# Patient Record
Sex: MALE | Race: WHITE | NOT HISPANIC OR LATINO | Employment: PART TIME | URBAN - METROPOLITAN AREA
[De-identification: names, ages, dates, MRNs, and addresses within clinical notes are randomized per-mention and may not be internally consistent; named-entity substitution may affect disease eponyms.]

---

## 2019-01-07 ENCOUNTER — APPOINTMENT (OUTPATIENT)
Dept: URGENT CARE | Facility: CLINIC | Age: 34
End: 2019-01-07

## 2019-01-07 ENCOUNTER — APPOINTMENT (OUTPATIENT)
Dept: LAB | Facility: CLINIC | Age: 34
End: 2019-01-07

## 2019-01-07 DIAGNOSIS — Z02.1 PRE-EMPLOYMENT EXAMINATION: ICD-10-CM

## 2019-01-07 DIAGNOSIS — Z02.1 PRE-EMPLOYMENT EXAMINATION: Primary | ICD-10-CM

## 2019-01-07 PROCEDURE — 36415 COLL VENOUS BLD VENIPUNCTURE: CPT

## 2019-01-07 PROCEDURE — 86480 TB TEST CELL IMMUN MEASURE: CPT

## 2019-01-09 LAB
GAMMA INTERFERON BACKGROUND BLD IA-ACNC: 0.03 IU/ML
M TB IFN-G BLD-IMP: NEGATIVE
M TB IFN-G CD4+ BCKGRND COR BLD-ACNC: -0.01 IU/ML
M TB IFN-G CD4+ BCKGRND COR BLD-ACNC: -0.01 IU/ML
MITOGEN IGNF BCKGRD COR BLD-ACNC: >10 IU/ML

## 2019-03-05 ENCOUNTER — APPOINTMENT (OUTPATIENT)
Dept: LAB | Facility: CLINIC | Age: 34
End: 2019-03-05

## 2019-03-05 ENCOUNTER — TRANSCRIBE ORDERS (OUTPATIENT)
Dept: ADMINISTRATIVE | Facility: HOSPITAL | Age: 34
End: 2019-03-05

## 2019-03-05 DIAGNOSIS — Z01.84 IMMUNITY STATUS TESTING: ICD-10-CM

## 2019-03-05 DIAGNOSIS — Z01.84 IMMUNITY STATUS TESTING: Primary | ICD-10-CM

## 2019-03-05 PROCEDURE — 36415 COLL VENOUS BLD VENIPUNCTURE: CPT

## 2019-03-05 PROCEDURE — 86787 VARICELLA-ZOSTER ANTIBODY: CPT

## 2019-03-07 LAB — VZV IGG SER IA-ACNC: NORMAL

## 2020-04-08 ENCOUNTER — NURSE TRIAGE (OUTPATIENT)
Dept: OTHER | Facility: OTHER | Age: 35
End: 2020-04-08

## 2020-08-04 ENCOUNTER — TRANSCRIBE ORDERS (OUTPATIENT)
Dept: ADMINISTRATIVE | Facility: HOSPITAL | Age: 35
End: 2020-08-04

## 2020-08-04 DIAGNOSIS — R23.9 RECENT SKIN CHANGES: ICD-10-CM

## 2020-08-04 DIAGNOSIS — L03.116 CELLULITIS OF LEFT FOOT: Primary | ICD-10-CM

## 2020-12-30 ENCOUNTER — IMMUNIZATIONS (OUTPATIENT)
Dept: FAMILY MEDICINE CLINIC | Facility: HOSPITAL | Age: 35
End: 2020-12-30

## 2020-12-30 DIAGNOSIS — Z23 ENCOUNTER FOR IMMUNIZATION: ICD-10-CM

## 2020-12-30 PROCEDURE — 91301 SARS-COV-2 / COVID-19 MRNA VACCINE (MODERNA) 100 MCG: CPT

## 2020-12-30 PROCEDURE — 0011A SARS-COV-2 / COVID-19 MRNA VACCINE (MODERNA) 100 MCG: CPT

## 2021-01-26 ENCOUNTER — IMMUNIZATIONS (OUTPATIENT)
Dept: FAMILY MEDICINE CLINIC | Facility: HOSPITAL | Age: 36
End: 2021-01-26

## 2021-01-26 DIAGNOSIS — Z23 ENCOUNTER FOR IMMUNIZATION: Primary | ICD-10-CM

## 2021-01-26 PROCEDURE — 0012A SARS-COV-2 / COVID-19 MRNA VACCINE (MODERNA) 100 MCG: CPT

## 2021-01-26 PROCEDURE — 91301 SARS-COV-2 / COVID-19 MRNA VACCINE (MODERNA) 100 MCG: CPT

## 2021-08-24 ENCOUNTER — APPOINTMENT (OUTPATIENT)
Dept: LAB | Facility: CLINIC | Age: 36
End: 2021-08-24
Payer: COMMERCIAL

## 2021-08-24 DIAGNOSIS — L03.116 CELLULITIS OF LEFT FOOT: ICD-10-CM

## 2021-08-24 DIAGNOSIS — R60.9 EDEMA, UNSPECIFIED TYPE: ICD-10-CM

## 2021-08-24 DIAGNOSIS — E78.5 HYPERLIPIDEMIA, UNSPECIFIED HYPERLIPIDEMIA TYPE: ICD-10-CM

## 2021-08-24 LAB
ALBUMIN SERPL BCP-MCNC: 3.2 G/DL (ref 3.5–5)
ALP SERPL-CCNC: 100 U/L (ref 46–116)
ALT SERPL W P-5'-P-CCNC: 37 U/L (ref 12–78)
ANION GAP SERPL CALCULATED.3IONS-SCNC: 5 MMOL/L (ref 4–13)
AST SERPL W P-5'-P-CCNC: 17 U/L (ref 5–45)
BASOPHILS # BLD AUTO: 0.06 THOUSANDS/ΜL (ref 0–0.1)
BASOPHILS NFR BLD AUTO: 1 % (ref 0–1)
BILIRUB SERPL-MCNC: 0.52 MG/DL (ref 0.2–1)
BUN SERPL-MCNC: 11 MG/DL (ref 5–25)
CALCIUM ALBUM COR SERPL-MCNC: 9.4 MG/DL (ref 8.3–10.1)
CALCIUM SERPL-MCNC: 8.8 MG/DL (ref 8.3–10.1)
CHLORIDE SERPL-SCNC: 107 MMOL/L (ref 100–108)
CHOLEST SERPL-MCNC: 161 MG/DL (ref 50–200)
CO2 SERPL-SCNC: 28 MMOL/L (ref 21–32)
CREAT SERPL-MCNC: 0.86 MG/DL (ref 0.6–1.3)
EOSINOPHIL # BLD AUTO: 0.33 THOUSAND/ΜL (ref 0–0.61)
EOSINOPHIL NFR BLD AUTO: 3 % (ref 0–6)
ERYTHROCYTE [DISTWIDTH] IN BLOOD BY AUTOMATED COUNT: 13.6 % (ref 11.6–15.1)
GFR SERPL CREATININE-BSD FRML MDRD: 112 ML/MIN/1.73SQ M
GLUCOSE P FAST SERPL-MCNC: 98 MG/DL (ref 65–99)
HCT VFR BLD AUTO: 44.8 % (ref 36.5–49.3)
HDLC SERPL-MCNC: 53 MG/DL
HGB BLD-MCNC: 14.5 G/DL (ref 12–17)
IMM GRANULOCYTES # BLD AUTO: 0.07 THOUSAND/UL (ref 0–0.2)
IMM GRANULOCYTES NFR BLD AUTO: 1 % (ref 0–2)
LDLC SERPL CALC-MCNC: 92 MG/DL (ref 0–100)
LYMPHOCYTES # BLD AUTO: 3.22 THOUSANDS/ΜL (ref 0.6–4.47)
LYMPHOCYTES NFR BLD AUTO: 28 % (ref 14–44)
MCH RBC QN AUTO: 27.9 PG (ref 26.8–34.3)
MCHC RBC AUTO-ENTMCNC: 32.4 G/DL (ref 31.4–37.4)
MCV RBC AUTO: 86 FL (ref 82–98)
MONOCYTES # BLD AUTO: 0.98 THOUSAND/ΜL (ref 0.17–1.22)
MONOCYTES NFR BLD AUTO: 9 % (ref 4–12)
NEUTROPHILS # BLD AUTO: 6.77 THOUSANDS/ΜL (ref 1.85–7.62)
NEUTS SEG NFR BLD AUTO: 58 % (ref 43–75)
NONHDLC SERPL-MCNC: 108 MG/DL
NRBC BLD AUTO-RTO: 0 /100 WBCS
PLATELET # BLD AUTO: 192 THOUSANDS/UL (ref 149–390)
PMV BLD AUTO: 11.4 FL (ref 8.9–12.7)
POTASSIUM SERPL-SCNC: 3.9 MMOL/L (ref 3.5–5.3)
PROT SERPL-MCNC: 7.4 G/DL (ref 6.4–8.2)
RBC # BLD AUTO: 5.19 MILLION/UL (ref 3.88–5.62)
SODIUM SERPL-SCNC: 140 MMOL/L (ref 136–145)
T4 SERPL-MCNC: 8.9 UG/DL (ref 4.7–13.3)
TRIGL SERPL-MCNC: 82 MG/DL
TSH SERPL DL<=0.05 MIU/L-ACNC: 2.57 UIU/ML (ref 0.36–3.74)
WBC # BLD AUTO: 11.43 THOUSAND/UL (ref 4.31–10.16)

## 2021-08-24 PROCEDURE — 84443 ASSAY THYROID STIM HORMONE: CPT

## 2021-08-24 PROCEDURE — 85025 COMPLETE CBC W/AUTO DIFF WBC: CPT

## 2021-08-24 PROCEDURE — 80053 COMPREHEN METABOLIC PANEL: CPT

## 2021-08-24 PROCEDURE — 36415 COLL VENOUS BLD VENIPUNCTURE: CPT

## 2021-08-24 PROCEDURE — 80061 LIPID PANEL: CPT

## 2021-08-24 PROCEDURE — 84436 ASSAY OF TOTAL THYROXINE: CPT

## 2021-09-28 ENCOUNTER — HOSPITAL ENCOUNTER (INPATIENT)
Facility: HOSPITAL | Age: 36
LOS: 2 days | Discharge: HOME | DRG: 602 | End: 2021-10-01
Attending: INTERNAL MEDICINE | Admitting: INTERNAL MEDICINE
Payer: COMMERCIAL

## 2021-09-28 ENCOUNTER — APPOINTMENT (EMERGENCY)
Dept: VASCULAR ULTRASOUND | Facility: HOSPITAL | Age: 36
DRG: 602 | End: 2021-09-28
Payer: COMMERCIAL

## 2021-09-28 DIAGNOSIS — L03.115 CELLULITIS OF RIGHT LOWER LEG: Primary | ICD-10-CM

## 2021-09-28 DIAGNOSIS — L03.116 CELLULITIS OF LEFT LEG: ICD-10-CM

## 2021-09-28 DIAGNOSIS — L03.116 CELLULITIS OF LEFT LOWER EXTREMITY: ICD-10-CM

## 2021-09-28 PROBLEM — M79.89 LEG SWELLING: Status: ACTIVE | Noted: 2021-09-28

## 2021-09-28 PROBLEM — I31.3 PERICARDIAL EFFUSION: Status: ACTIVE | Noted: 2021-09-28

## 2021-09-28 PROBLEM — I47.10 SVT (SUPRAVENTRICULAR TACHYCARDIA): Status: ACTIVE | Noted: 2021-09-28

## 2021-09-28 PROBLEM — K21.9 GERD (GASTROESOPHAGEAL REFLUX DISEASE): Status: ACTIVE | Noted: 2021-09-28

## 2021-09-28 PROBLEM — E66.01 MORBID OBESITY WITH BMI OF 60.0-69.9, ADULT (HCC): Status: ACTIVE | Noted: 2021-09-28

## 2021-09-28 PROBLEM — I31.39 PERICARDIAL EFFUSION: Status: ACTIVE | Noted: 2021-09-28

## 2021-09-28 PROBLEM — I47.1 SVT (SUPRAVENTRICULAR TACHYCARDIA) (HCC): Status: ACTIVE | Noted: 2021-09-28

## 2021-09-28 PROBLEM — L03.119 CELLULITIS OF LOWER EXTREMITY: Status: ACTIVE | Noted: 2021-09-28

## 2021-09-28 PROBLEM — J45.909 ASTHMA: Status: ACTIVE | Noted: 2021-09-28

## 2021-09-28 LAB
ALBUMIN SERPL BCP-MCNC: 3.2 G/DL (ref 3.5–5)
ALP SERPL-CCNC: 85 U/L (ref 46–116)
ALT SERPL W P-5'-P-CCNC: 42 U/L (ref 12–78)
ANION GAP SERPL CALCULATED.3IONS-SCNC: 9 MMOL/L (ref 4–13)
APTT PPP: 30 SECONDS (ref 23–37)
AST SERPL W P-5'-P-CCNC: 19 U/L (ref 5–45)
BASOPHILS # BLD AUTO: 0.04 THOUSANDS/ΜL (ref 0–0.1)
BASOPHILS NFR BLD AUTO: 0 % (ref 0–1)
BILIRUB DIRECT SERPL-MCNC: 0.45 MG/DL (ref 0–0.2)
BILIRUB SERPL-MCNC: 1.77 MG/DL (ref 0.2–1)
BUN SERPL-MCNC: 13 MG/DL (ref 5–25)
CALCIUM SERPL-MCNC: 8.7 MG/DL (ref 8.3–10.1)
CHLORIDE SERPL-SCNC: 100 MMOL/L (ref 100–108)
CO2 SERPL-SCNC: 25 MMOL/L (ref 21–32)
CREAT SERPL-MCNC: 0.91 MG/DL (ref 0.6–1.3)
CRP SERPL QL: 146.9 MG/L
EOSINOPHIL # BLD AUTO: 0.31 THOUSAND/ΜL (ref 0–0.61)
EOSINOPHIL NFR BLD AUTO: 2 % (ref 0–6)
ERYTHROCYTE [DISTWIDTH] IN BLOOD BY AUTOMATED COUNT: 13.8 % (ref 11.6–15.1)
ERYTHROCYTE [SEDIMENTATION RATE] IN BLOOD: 42 MM/HOUR (ref 0–14)
GFR SERPL CREATININE-BSD FRML MDRD: 108 ML/MIN/1.73SQ M
GLUCOSE SERPL-MCNC: 96 MG/DL (ref 65–140)
HCT VFR BLD AUTO: 41.5 % (ref 36.5–49.3)
HGB BLD-MCNC: 13.7 G/DL (ref 12–17)
IMM GRANULOCYTES # BLD AUTO: 0.12 THOUSAND/UL (ref 0–0.2)
IMM GRANULOCYTES NFR BLD AUTO: 1 % (ref 0–2)
INR PPP: 1.11 (ref 0.84–1.19)
LYMPHOCYTES # BLD AUTO: 2.04 THOUSANDS/ΜL (ref 0.6–4.47)
LYMPHOCYTES NFR BLD AUTO: 15 % (ref 14–44)
MCH RBC QN AUTO: 27.7 PG (ref 26.8–34.3)
MCHC RBC AUTO-ENTMCNC: 33 G/DL (ref 31.4–37.4)
MCV RBC AUTO: 84 FL (ref 82–98)
MONOCYTES # BLD AUTO: 1.65 THOUSAND/ΜL (ref 0.17–1.22)
MONOCYTES NFR BLD AUTO: 12 % (ref 4–12)
NEUTROPHILS # BLD AUTO: 9.93 THOUSANDS/ΜL (ref 1.85–7.62)
NEUTS SEG NFR BLD AUTO: 70 % (ref 43–75)
NRBC BLD AUTO-RTO: 0 /100 WBCS
PLATELET # BLD AUTO: 165 THOUSANDS/UL (ref 149–390)
PLATELET # BLD AUTO: 202 THOUSANDS/UL (ref 149–390)
PMV BLD AUTO: 10.2 FL (ref 8.9–12.7)
PMV BLD AUTO: 11 FL (ref 8.9–12.7)
POTASSIUM SERPL-SCNC: 3.9 MMOL/L (ref 3.5–5.3)
PROT SERPL-MCNC: 7.4 G/DL (ref 6.4–8.2)
PROTHROMBIN TIME: 13.9 SECONDS (ref 11.6–14.5)
RBC # BLD AUTO: 4.95 MILLION/UL (ref 3.88–5.62)
SODIUM SERPL-SCNC: 134 MMOL/L (ref 136–145)
WBC # BLD AUTO: 14.09 THOUSAND/UL (ref 4.31–10.16)

## 2021-09-28 PROCEDURE — 93971 EXTREMITY STUDY: CPT

## 2021-09-28 PROCEDURE — 93971 EXTREMITY STUDY: CPT | Performed by: SURGERY

## 2021-09-28 PROCEDURE — 36415 COLL VENOUS BLD VENIPUNCTURE: CPT | Performed by: PHYSICIAN ASSISTANT

## 2021-09-28 PROCEDURE — 99284 EMERGENCY DEPT VISIT MOD MDM: CPT | Performed by: PHYSICIAN ASSISTANT

## 2021-09-28 PROCEDURE — 85025 COMPLETE CBC W/AUTO DIFF WBC: CPT | Performed by: PHYSICIAN ASSISTANT

## 2021-09-28 PROCEDURE — 80076 HEPATIC FUNCTION PANEL: CPT | Performed by: PHYSICIAN ASSISTANT

## 2021-09-28 PROCEDURE — 99220 PR INITIAL OBSERVATION CARE/DAY 70 MINUTES: CPT | Performed by: INTERNAL MEDICINE

## 2021-09-28 PROCEDURE — 85610 PROTHROMBIN TIME: CPT | Performed by: PHYSICIAN ASSISTANT

## 2021-09-28 PROCEDURE — 86140 C-REACTIVE PROTEIN: CPT | Performed by: INTERNAL MEDICINE

## 2021-09-28 PROCEDURE — 80048 BASIC METABOLIC PNL TOTAL CA: CPT | Performed by: PHYSICIAN ASSISTANT

## 2021-09-28 PROCEDURE — 85049 AUTOMATED PLATELET COUNT: CPT | Performed by: NURSE PRACTITIONER

## 2021-09-28 PROCEDURE — 85652 RBC SED RATE AUTOMATED: CPT | Performed by: INTERNAL MEDICINE

## 2021-09-28 PROCEDURE — 87040 BLOOD CULTURE FOR BACTERIA: CPT | Performed by: PHYSICIAN ASSISTANT

## 2021-09-28 PROCEDURE — 99284 EMERGENCY DEPT VISIT MOD MDM: CPT

## 2021-09-28 PROCEDURE — 85730 THROMBOPLASTIN TIME PARTIAL: CPT | Performed by: PHYSICIAN ASSISTANT

## 2021-09-28 RX ORDER — SODIUM CHLORIDE 9 MG/ML
75 INJECTION, SOLUTION INTRAVENOUS CONTINUOUS
Status: DISPENSED | OUTPATIENT
Start: 2021-09-28 | End: 2021-09-29

## 2021-09-28 RX ORDER — CEFAZOLIN SODIUM 2 G/50ML
2000 SOLUTION INTRAVENOUS EVERY 8 HOURS
Status: DISCONTINUED | OUTPATIENT
Start: 2021-09-28 | End: 2021-10-01

## 2021-09-28 RX ORDER — ACETAMINOPHEN 325 MG/1
650 TABLET ORAL EVERY 6 HOURS PRN
Status: DISCONTINUED | OUTPATIENT
Start: 2021-09-28 | End: 2021-10-01 | Stop reason: HOSPADM

## 2021-09-28 RX ORDER — ONDANSETRON 2 MG/ML
4 INJECTION INTRAMUSCULAR; INTRAVENOUS EVERY 6 HOURS PRN
Status: DISCONTINUED | OUTPATIENT
Start: 2021-09-28 | End: 2021-10-01 | Stop reason: HOSPADM

## 2021-09-28 RX ADMIN — SODIUM CHLORIDE 3 G: 9 INJECTION, SOLUTION INTRAVENOUS at 12:04

## 2021-09-28 RX ADMIN — SODIUM CHLORIDE 75 ML/HR: 0.9 INJECTION, SOLUTION INTRAVENOUS at 16:00

## 2021-09-28 RX ADMIN — CEFAZOLIN SODIUM 2000 MG: 2 SOLUTION INTRAVENOUS at 22:04

## 2021-09-28 RX ADMIN — ENOXAPARIN SODIUM 60 MG: 60 INJECTION SUBCUTANEOUS at 17:37

## 2021-09-28 RX ADMIN — ACETAMINOPHEN 650 MG: 325 TABLET, FILM COATED ORAL at 17:40

## 2021-09-29 PROBLEM — D72.829 LEUKOCYTOSIS: Status: ACTIVE | Noted: 2021-09-29

## 2021-09-29 PROBLEM — L53.9 ERYTHEMA OF LOWER EXTREMITY: Status: ACTIVE | Noted: 2021-09-28

## 2021-09-29 LAB
ALBUMIN SERPL BCP-MCNC: 2.9 G/DL (ref 3.5–5)
ALP SERPL-CCNC: 82 U/L (ref 46–116)
ALT SERPL W P-5'-P-CCNC: 33 U/L (ref 12–78)
ANION GAP SERPL CALCULATED.3IONS-SCNC: 6 MMOL/L (ref 4–13)
AST SERPL W P-5'-P-CCNC: 17 U/L (ref 5–45)
BILIRUB SERPL-MCNC: 1.17 MG/DL (ref 0.2–1)
BUN SERPL-MCNC: 10 MG/DL (ref 5–25)
CALCIUM ALBUM COR SERPL-MCNC: 9.2 MG/DL (ref 8.3–10.1)
CALCIUM SERPL-MCNC: 8.3 MG/DL (ref 8.3–10.1)
CHLORIDE SERPL-SCNC: 102 MMOL/L (ref 100–108)
CO2 SERPL-SCNC: 28 MMOL/L (ref 21–32)
CREAT SERPL-MCNC: 0.94 MG/DL (ref 0.6–1.3)
ERYTHROCYTE [DISTWIDTH] IN BLOOD BY AUTOMATED COUNT: 13.9 % (ref 11.6–15.1)
GFR SERPL CREATININE-BSD FRML MDRD: 104 ML/MIN/1.73SQ M
GLUCOSE P FAST SERPL-MCNC: 104 MG/DL (ref 65–99)
GLUCOSE SERPL-MCNC: 104 MG/DL (ref 65–140)
HCT VFR BLD AUTO: 37.8 % (ref 36.5–49.3)
HGB BLD-MCNC: 12.3 G/DL (ref 12–17)
MAGNESIUM SERPL-MCNC: 2.1 MG/DL (ref 1.6–2.6)
MCH RBC QN AUTO: 27.5 PG (ref 26.8–34.3)
MCHC RBC AUTO-ENTMCNC: 32.5 G/DL (ref 31.4–37.4)
MCV RBC AUTO: 85 FL (ref 82–98)
PHOSPHATE SERPL-MCNC: 2.8 MG/DL (ref 2.7–4.5)
PLATELET # BLD AUTO: 163 THOUSANDS/UL (ref 149–390)
PMV BLD AUTO: 10.9 FL (ref 8.9–12.7)
POTASSIUM SERPL-SCNC: 4.1 MMOL/L (ref 3.5–5.3)
PROT SERPL-MCNC: 7 G/DL (ref 6.4–8.2)
RBC # BLD AUTO: 4.47 MILLION/UL (ref 3.88–5.62)
SODIUM SERPL-SCNC: 136 MMOL/L (ref 136–145)
WBC # BLD AUTO: 12.09 THOUSAND/UL (ref 4.31–10.16)

## 2021-09-29 PROCEDURE — 83735 ASSAY OF MAGNESIUM: CPT | Performed by: NURSE PRACTITIONER

## 2021-09-29 PROCEDURE — 99255 IP/OBS CONSLTJ NEW/EST HI 80: CPT | Performed by: INTERNAL MEDICINE

## 2021-09-29 PROCEDURE — 85027 COMPLETE CBC AUTOMATED: CPT | Performed by: NURSE PRACTITIONER

## 2021-09-29 PROCEDURE — 99225 PR SBSQ OBSERVATION CARE/DAY 25 MINUTES: CPT | Performed by: PHYSICIAN ASSISTANT

## 2021-09-29 PROCEDURE — 80053 COMPREHEN METABOLIC PANEL: CPT | Performed by: NURSE PRACTITIONER

## 2021-09-29 PROCEDURE — 36415 COLL VENOUS BLD VENIPUNCTURE: CPT | Performed by: NURSE PRACTITIONER

## 2021-09-29 PROCEDURE — 84100 ASSAY OF PHOSPHORUS: CPT | Performed by: NURSE PRACTITIONER

## 2021-09-29 RX ORDER — DILTIAZEM HYDROCHLORIDE 120 MG/1
120 CAPSULE, COATED, EXTENDED RELEASE ORAL DAILY
Status: DISCONTINUED | OUTPATIENT
Start: 2021-09-29 | End: 2021-10-01 | Stop reason: HOSPADM

## 2021-09-29 RX ORDER — MONTELUKAST SODIUM 10 MG/1
10 TABLET ORAL
Status: DISCONTINUED | OUTPATIENT
Start: 2021-09-29 | End: 2021-10-01 | Stop reason: HOSPADM

## 2021-09-29 RX ADMIN — MONTELUKAST SODIUM 10 MG: 10 TABLET, FILM COATED ORAL at 21:42

## 2021-09-29 RX ADMIN — CEFAZOLIN SODIUM 2000 MG: 2 SOLUTION INTRAVENOUS at 04:39

## 2021-09-29 RX ADMIN — ACETAMINOPHEN 650 MG: 325 TABLET, FILM COATED ORAL at 18:41

## 2021-09-29 RX ADMIN — CEFAZOLIN SODIUM 2000 MG: 2 SOLUTION INTRAVENOUS at 13:45

## 2021-09-29 RX ADMIN — CEFAZOLIN SODIUM 2000 MG: 2 SOLUTION INTRAVENOUS at 21:38

## 2021-09-29 RX ADMIN — ENOXAPARIN SODIUM 60 MG: 60 INJECTION SUBCUTANEOUS at 08:28

## 2021-09-29 RX ADMIN — ENOXAPARIN SODIUM 60 MG: 60 INJECTION SUBCUTANEOUS at 18:42

## 2021-09-29 RX ADMIN — DILTIAZEM HYDROCHLORIDE 120 MG: 120 CAPSULE, COATED, EXTENDED RELEASE ORAL at 08:28

## 2021-09-30 ENCOUNTER — APPOINTMENT (INPATIENT)
Dept: RADIOLOGY | Facility: HOSPITAL | Age: 36
DRG: 602 | End: 2021-09-30
Payer: COMMERCIAL

## 2021-09-30 PROBLEM — L03.116 CELLULITIS OF LEFT LOWER EXTREMITY: Status: ACTIVE | Noted: 2021-09-28

## 2021-09-30 LAB
ANION GAP SERPL CALCULATED.3IONS-SCNC: 10 MMOL/L (ref 4–13)
BUN SERPL-MCNC: 8 MG/DL (ref 5–25)
CALCIUM SERPL-MCNC: 8.2 MG/DL (ref 8.3–10.1)
CHLORIDE SERPL-SCNC: 103 MMOL/L (ref 100–108)
CO2 SERPL-SCNC: 26 MMOL/L (ref 21–32)
CREAT SERPL-MCNC: 0.91 MG/DL (ref 0.6–1.3)
ERYTHROCYTE [DISTWIDTH] IN BLOOD BY AUTOMATED COUNT: 13.7 % (ref 11.6–15.1)
GFR SERPL CREATININE-BSD FRML MDRD: 108 ML/MIN/1.73SQ M
GLUCOSE SERPL-MCNC: 107 MG/DL (ref 65–140)
HCT VFR BLD AUTO: 38.4 % (ref 36.5–49.3)
HGB BLD-MCNC: 12.3 G/DL (ref 12–17)
MCH RBC QN AUTO: 27.5 PG (ref 26.8–34.3)
MCHC RBC AUTO-ENTMCNC: 32 G/DL (ref 31.4–37.4)
MCV RBC AUTO: 86 FL (ref 82–98)
PLATELET # BLD AUTO: 182 THOUSANDS/UL (ref 149–390)
PMV BLD AUTO: 11.4 FL (ref 8.9–12.7)
POTASSIUM SERPL-SCNC: 4.1 MMOL/L (ref 3.5–5.3)
RBC # BLD AUTO: 4.47 MILLION/UL (ref 3.88–5.62)
SARS-COV-2 RNA RESP QL NAA+PROBE: NEGATIVE
SODIUM SERPL-SCNC: 139 MMOL/L (ref 136–145)
WBC # BLD AUTO: 12.55 THOUSAND/UL (ref 4.31–10.16)

## 2021-09-30 PROCEDURE — 80048 BASIC METABOLIC PNL TOTAL CA: CPT | Performed by: PHYSICIAN ASSISTANT

## 2021-09-30 PROCEDURE — 99233 SBSQ HOSP IP/OBS HIGH 50: CPT | Performed by: INTERNAL MEDICINE

## 2021-09-30 PROCEDURE — U0003 INFECTIOUS AGENT DETECTION BY NUCLEIC ACID (DNA OR RNA); SEVERE ACUTE RESPIRATORY SYNDROME CORONAVIRUS 2 (SARS-COV-2) (CORONAVIRUS DISEASE [COVID-19]), AMPLIFIED PROBE TECHNIQUE, MAKING USE OF HIGH THROUGHPUT TECHNOLOGIES AS DESCRIBED BY CMS-2020-01-R: HCPCS | Performed by: INTERNAL MEDICINE

## 2021-09-30 PROCEDURE — 85027 COMPLETE CBC AUTOMATED: CPT | Performed by: PHYSICIAN ASSISTANT

## 2021-09-30 PROCEDURE — 71045 X-RAY EXAM CHEST 1 VIEW: CPT

## 2021-09-30 PROCEDURE — 99232 SBSQ HOSP IP/OBS MODERATE 35: CPT | Performed by: PHYSICIAN ASSISTANT

## 2021-09-30 PROCEDURE — U0005 INFEC AGEN DETEC AMPLI PROBE: HCPCS | Performed by: INTERNAL MEDICINE

## 2021-09-30 RX ORDER — MONTELUKAST SODIUM 10 MG/1
10 TABLET ORAL
COMMUNITY

## 2021-09-30 RX ORDER — DILTIAZEM HYDROCHLORIDE 120 MG/1
120 TABLET, FILM COATED ORAL DAILY
COMMUNITY

## 2021-09-30 RX ADMIN — ENOXAPARIN SODIUM 60 MG: 60 INJECTION SUBCUTANEOUS at 08:19

## 2021-09-30 RX ADMIN — MONTELUKAST SODIUM 10 MG: 10 TABLET, FILM COATED ORAL at 21:10

## 2021-09-30 RX ADMIN — CEFAZOLIN SODIUM 2000 MG: 2 SOLUTION INTRAVENOUS at 05:17

## 2021-09-30 RX ADMIN — CEFAZOLIN SODIUM 2000 MG: 2 SOLUTION INTRAVENOUS at 13:56

## 2021-09-30 RX ADMIN — CEFAZOLIN SODIUM 2000 MG: 2 SOLUTION INTRAVENOUS at 20:30

## 2021-09-30 RX ADMIN — ENOXAPARIN SODIUM 60 MG: 60 INJECTION SUBCUTANEOUS at 17:17

## 2021-09-30 RX ADMIN — DILTIAZEM HYDROCHLORIDE 120 MG: 120 CAPSULE, COATED, EXTENDED RELEASE ORAL at 08:19

## 2021-10-01 VITALS
DIASTOLIC BLOOD PRESSURE: 82 MMHG | TEMPERATURE: 98.2 F | WEIGHT: 315 LBS | SYSTOLIC BLOOD PRESSURE: 128 MMHG | BODY MASS INDEX: 45.1 KG/M2 | HEIGHT: 70 IN | RESPIRATION RATE: 20 BRPM | OXYGEN SATURATION: 93 % | HEART RATE: 83 BPM

## 2021-10-01 LAB
ERYTHROCYTE [DISTWIDTH] IN BLOOD BY AUTOMATED COUNT: 13.7 % (ref 11.6–15.1)
HCT VFR BLD AUTO: 38.3 % (ref 36.5–49.3)
HGB BLD-MCNC: 12.4 G/DL (ref 12–17)
MCH RBC QN AUTO: 27.9 PG (ref 26.8–34.3)
MCHC RBC AUTO-ENTMCNC: 32.4 G/DL (ref 31.4–37.4)
MCV RBC AUTO: 86 FL (ref 82–98)
PLATELET # BLD AUTO: 250 THOUSANDS/UL (ref 149–390)
PMV BLD AUTO: 10 FL (ref 8.9–12.7)
RBC # BLD AUTO: 4.45 MILLION/UL (ref 3.88–5.62)
WBC # BLD AUTO: 11.88 THOUSAND/UL (ref 4.31–10.16)

## 2021-10-01 PROCEDURE — 99232 SBSQ HOSP IP/OBS MODERATE 35: CPT | Performed by: INTERNAL MEDICINE

## 2021-10-01 PROCEDURE — 99239 HOSP IP/OBS DSCHRG MGMT >30: CPT | Performed by: NURSE PRACTITIONER

## 2021-10-01 PROCEDURE — 85027 COMPLETE CBC AUTOMATED: CPT | Performed by: PHYSICIAN ASSISTANT

## 2021-10-01 RX ORDER — CEPHALEXIN 500 MG/1
1000 CAPSULE ORAL EVERY 6 HOURS SCHEDULED
Status: DISCONTINUED | OUTPATIENT
Start: 2021-10-01 | End: 2021-10-01 | Stop reason: HOSPADM

## 2021-10-01 RX ORDER — CEPHALEXIN 500 MG/1
1000 CAPSULE ORAL EVERY 6 HOURS SCHEDULED
Qty: 56 CAPSULE | Refills: 0 | Status: SHIPPED | OUTPATIENT
Start: 2021-10-01 | End: 2021-10-01 | Stop reason: SDUPTHER

## 2021-10-01 RX ORDER — CEPHALEXIN 500 MG/1
1000 CAPSULE ORAL EVERY 6 HOURS SCHEDULED
Qty: 56 CAPSULE | Refills: 0 | Status: SHIPPED | OUTPATIENT
Start: 2021-10-01 | End: 2021-10-08

## 2021-10-01 RX ADMIN — CEFAZOLIN SODIUM 2000 MG: 2 SOLUTION INTRAVENOUS at 05:43

## 2021-10-01 RX ADMIN — ENOXAPARIN SODIUM 60 MG: 60 INJECTION SUBCUTANEOUS at 08:00

## 2021-10-01 RX ADMIN — CEPHALEXIN 1000 MG: 500 CAPSULE ORAL at 13:15

## 2021-10-01 RX ADMIN — DILTIAZEM HYDROCHLORIDE 120 MG: 120 CAPSULE, COATED, EXTENDED RELEASE ORAL at 08:00

## 2021-10-03 LAB
BACTERIA BLD CULT: NORMAL
BACTERIA BLD CULT: NORMAL

## 2021-11-10 ENCOUNTER — IMMUNIZATIONS (OUTPATIENT)
Dept: FAMILY MEDICINE CLINIC | Facility: HOSPITAL | Age: 36
End: 2021-11-10

## 2021-11-10 ENCOUNTER — OFFICE VISIT (OUTPATIENT)
Dept: INFECTIOUS DISEASES | Facility: CLINIC | Age: 36
End: 2021-11-10
Payer: COMMERCIAL

## 2021-11-10 VITALS
TEMPERATURE: 97.8 F | SYSTOLIC BLOOD PRESSURE: 146 MMHG | HEIGHT: 70 IN | DIASTOLIC BLOOD PRESSURE: 80 MMHG | BODY MASS INDEX: 45.1 KG/M2 | WEIGHT: 315 LBS | RESPIRATION RATE: 17 BRPM | HEART RATE: 78 BPM

## 2021-11-10 DIAGNOSIS — Z23 ENCOUNTER FOR IMMUNIZATION: Primary | ICD-10-CM

## 2021-11-10 DIAGNOSIS — L97.909 VENOUS STASIS ULCER WITH EDEMA OF LOWER LEG (HCC): Primary | ICD-10-CM

## 2021-11-10 DIAGNOSIS — R60.9 VENOUS STASIS ULCER WITH EDEMA OF LOWER LEG (HCC): Primary | ICD-10-CM

## 2021-11-10 DIAGNOSIS — I83.899 VENOUS STASIS ULCER WITH EDEMA OF LOWER LEG (HCC): Primary | ICD-10-CM

## 2021-11-10 DIAGNOSIS — Z87.2 HISTORY OF CELLULITIS: ICD-10-CM

## 2021-11-10 DIAGNOSIS — I83.009 VENOUS STASIS ULCER WITH EDEMA OF LOWER LEG (HCC): Primary | ICD-10-CM

## 2021-11-10 DIAGNOSIS — E66.01 MORBID OBESITY WITH BMI OF 60.0-69.9, ADULT (HCC): ICD-10-CM

## 2021-11-10 PROCEDURE — 91306 COVID-19 MODERNA VACC 0.25 ML BOOSTER: CPT

## 2021-11-10 PROCEDURE — 99214 OFFICE O/P EST MOD 30 MIN: CPT | Performed by: INTERNAL MEDICINE

## 2021-11-10 PROCEDURE — 0013A COVID-19 MODERNA VACC 0.25 ML BOOSTER: CPT

## 2021-11-10 RX ORDER — ALBUTEROL SULFATE 90 UG/1
2 AEROSOL, METERED RESPIRATORY (INHALATION) EVERY 6 HOURS PRN
COMMUNITY

## 2021-11-11 DIAGNOSIS — L03.116 CELLULITIS OF LEFT LOWER EXTREMITY: Primary | ICD-10-CM

## 2021-12-29 ENCOUNTER — OFFICE VISIT (OUTPATIENT)
Dept: URGENT CARE | Facility: CLINIC | Age: 36
End: 2021-12-29
Payer: COMMERCIAL

## 2021-12-29 VITALS
OXYGEN SATURATION: 99 % | WEIGHT: 315 LBS | RESPIRATION RATE: 16 BRPM | BODY MASS INDEX: 57.97 KG/M2 | HEART RATE: 88 BPM | TEMPERATURE: 97.9 F | HEIGHT: 62 IN

## 2021-12-29 DIAGNOSIS — J02.9 SORE THROAT: ICD-10-CM

## 2021-12-29 DIAGNOSIS — J45.901 ASTHMA WITH ACUTE EXACERBATION, UNSPECIFIED ASTHMA SEVERITY, UNSPECIFIED WHETHER PERSISTENT: ICD-10-CM

## 2021-12-29 DIAGNOSIS — J06.9 UPPER RESPIRATORY INFECTION WITH COUGH AND CONGESTION: Primary | ICD-10-CM

## 2021-12-29 DIAGNOSIS — R50.81 FEVER IN OTHER DISEASES: ICD-10-CM

## 2021-12-29 LAB — S PYO AG THROAT QL: NEGATIVE

## 2021-12-29 PROCEDURE — 87880 STREP A ASSAY W/OPTIC: CPT | Performed by: NURSE PRACTITIONER

## 2021-12-29 PROCEDURE — 87636 SARSCOV2 & INF A&B AMP PRB: CPT | Performed by: NURSE PRACTITIONER

## 2021-12-29 PROCEDURE — 87070 CULTURE OTHR SPECIMN AEROBIC: CPT | Performed by: NURSE PRACTITIONER

## 2021-12-29 PROCEDURE — 99214 OFFICE O/P EST MOD 30 MIN: CPT | Performed by: NURSE PRACTITIONER

## 2021-12-29 RX ORDER — DOXYCYCLINE HYCLATE 100 MG
100 TABLET ORAL 2 TIMES DAILY
Qty: 14 TABLET | Refills: 0 | Status: SHIPPED | OUTPATIENT
Start: 2021-12-29 | End: 2022-01-05

## 2021-12-29 RX ORDER — PREDNISONE 20 MG/1
60 TABLET ORAL DAILY
Qty: 15 TABLET | Refills: 0 | Status: SHIPPED | OUTPATIENT
Start: 2021-12-29 | End: 2022-01-03

## 2021-12-31 LAB — BACTERIA THROAT CULT: NORMAL

## 2022-01-03 LAB
FLUAV RNA RESP QL NAA+PROBE: NEGATIVE
FLUBV RNA RESP QL NAA+PROBE: NEGATIVE
SARS-COV-2 RNA RESP QL NAA+PROBE: NEGATIVE

## 2022-01-18 LAB
EXTERNAL HIV CONFIRMATION: NORMAL
EXTERNAL HIV SCREEN: NORMAL
HCV AB SER-ACNC: NEGATIVE

## 2022-06-28 ENCOUNTER — OFFICE VISIT (OUTPATIENT)
Dept: BARIATRICS | Facility: CLINIC | Age: 37
End: 2022-06-28
Payer: COMMERCIAL

## 2022-06-28 VITALS
HEART RATE: 77 BPM | RESPIRATION RATE: 16 BRPM | DIASTOLIC BLOOD PRESSURE: 82 MMHG | HEIGHT: 69 IN | SYSTOLIC BLOOD PRESSURE: 142 MMHG | WEIGHT: 315 LBS | TEMPERATURE: 99 F | BODY MASS INDEX: 46.65 KG/M2

## 2022-06-28 DIAGNOSIS — G47.33 OBSTRUCTIVE SLEEP APNEA: ICD-10-CM

## 2022-06-28 DIAGNOSIS — K21.9 GERD (GASTROESOPHAGEAL REFLUX DISEASE): ICD-10-CM

## 2022-06-28 DIAGNOSIS — I47.1 SVT (SUPRAVENTRICULAR TACHYCARDIA) (HCC): ICD-10-CM

## 2022-06-28 DIAGNOSIS — J45.909 ASTHMA: ICD-10-CM

## 2022-06-28 DIAGNOSIS — E66.01 MORBID OBESITY WITH BMI OF 50.0-59.9, ADULT (HCC): Primary | ICD-10-CM

## 2022-06-28 DIAGNOSIS — R63.5 ABNORMAL WEIGHT GAIN: ICD-10-CM

## 2022-06-28 PROCEDURE — 99244 OFF/OP CNSLTJ NEW/EST MOD 40: CPT | Performed by: INTERNAL MEDICINE

## 2022-06-28 RX ORDER — DILTIAZEM HYDROCHLORIDE 120 MG/1
CAPSULE, EXTENDED RELEASE ORAL
COMMUNITY
Start: 2022-05-19

## 2022-06-28 NOTE — PROGRESS NOTES
Assessment/Plan:  Chris Conley was seen today for consult  Diagnoses and all orders for this visit:      SVT (supraventricular tachycardia) ([address] )  Avoid sympathomimetics    GERD (gastroesophageal reflux disease)  May improve with weight loss    Obstructive sleep apnea  Continue to use CPAP daily  Can improve with significant weight loss    Asthma  Well-controlled on medications      Morbid obesity with BMI of 50 0-59 9, adult (HCC)  Abnormal weight gain    - Discussed options of HealthyCORE-Intensive Lifestyle Intervention Program, Very Low Calorie Diet-VLCD, Conservative Program, Yudi-En-Y Gastric Bypass, and Vertical Sleeve Gastrectomy and the role of weight loss medications  - Explained the importance of making lifestyle changes first before starting any anti-obesity medications  Patient should demonstrate lifestyle changes first before anti-obesity medication can be initiated  - Patient is interested in pursuing Yudi-En-Y Gastric Bypass and Vertical Sleeve Gastrectomy  - Initial weight loss goal of 5-10% weight loss for improved health  - Weight loss can improve patient's co-morbid conditions and/or prevent weight-related complications  Goals:  Do not skip any meals! Food log (ie ) www myfitnesspal com,sparkpeople  com,loseit com,calorieking  com,etc  baritastic (use skinnytaste  com, dietdoctor  com or smartphone hung [employer] for recipes)  No sugary beverages  At least 64oz of water daily  Increase physical activity by 10 minutes daily  Gradually increase physical activity to a goal of 5 days per week for 30 minutes of MODERATE intensity PLUS 2 days per week of FULL BODY resistance training (use smartphone apps [employer], Home Workout, etc )  Goal protein 150g per day  9842-6224 calories     Total time spent: 45 minute visit, with >50% face-to-face time spent counseling patient on diet behavior and exercise modification for weight loss      Follow up in approximately 2 weeks with Bariatric Surgeon  Subjective:   Chief Complaint   Patient presents with    Consult     Initial visit with fausto       Patient ID: Mercedes Poole  is a 40 y o  male with excess weight/obesity here to pursue weight management  Previous notes and records have been reviewed  Past Medical History:   Diagnosis Date    Asthma     Pericardial effusion     Sleep apnea     SVT (supraventricular tachycardia) (HCC)      Past Surgical History:   Procedure Laterality Date    CARDIAC SURGERY         HPI:  Wt Readings from Last 20 Encounters:   06/28/22 (!) 208 kg (458 lb 6 4 oz)   12/29/21 (!) 145 kg (320 lb)   11/10/21 (!) 202 kg (445 lb)   09/29/21 (!) 202 kg (445 lb 5 3 oz)       Severity: Very Severe  Onset:  Since childhood     Modifiers: Diet and Exercise, behavioral changes  Contributing factors: Poor Food Choices and Insufficient Physical Activity  Associated symptoms: comorbid conditions  Goal weight loss: 5-10% STG   His mother had VSG and did fairly well- sometimes has trouble swallowing  Gets fast food a lot while at work  B- sleeps  S-  L- chicken sandwich  S-  D- pizza or gyro  S- trail mix, gummy bears, ara covered coffee beans    Hydration: 4-5 bottles water, sometimes tea, iced tea with sugar   Alcohol: rare  Smoking: no  Exercise: no  Occupation: 2 full time paramedic, works overnight, 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   In the process of closing a house with his girlfriend   Sleep: 4 hours   STOP bang: +sincere on cpap    The following portions of the patient's history were reviewed and updated as appropriate: allergies, current medications, past family history, past medical history, past social history, past surgical history, and problem list     Review of Systems   Constitutional: Negative for appetite change, chills and fever  HENT: Negative for rhinorrhea and sore throat  Respiratory: Negative for cough, chest tightness and shortness of breath      Cardiovascular: Negative for chest pain and leg swelling  Gastrointestinal: Positive for diarrhea (intermittently)  Negative for abdominal pain, constipation, nausea and vomiting         +acid reflux   Endocrine: Negative for cold intolerance and heat intolerance  Genitourinary: Negative for difficulty urinating  Musculoskeletal: Negative for arthralgias  Skin: Negative for color change  Neurological: Negative for dizziness, numbness and headaches  Psychiatric/Behavioral: Negative for sleep disturbance  The patient is not nervous/anxious  All other systems reviewed and are negative  Objective:  /82 (BP Location: Left arm, Patient Position: Sitting, Cuff Size: Large) Comment (BP Location): lower arm  Pulse 77   Temp 99 °F (37 2 °C) (Tympanic)   Resp 16   Ht 5' 9 13" (1 756 m)   Wt (!) 208 kg (458 lb 6 4 oz)   BMI 67 43 kg/m²   Constitutional: Well-developed, well-nourished and obese Body mass index is 67 43 kg/m²  [place] Given HEENT: No conjunctival pallor or jaundice  Pulmonary: No increased work of breathing or signs of respiratory distress  CV: Normal rate, well-perfused  GI: Obese  Non-distended  MSK: No edema   Neuro: Oriented to person, place and time  Normal Speech  Normal gait  Psych: Normal affect and mood  Labs and Imaging  Recent labs and imaging have been personally reviewed    Lab Results   Component Value Date    WBC 11 88 (H) 10/01/2021    HGB 12 4 10/01/2021    HCT 38 3 10/01/2021    MCV 86 10/01/2021     10/01/2021     Lab Results   Component Value Date    SODIUM 139 09/30/2021    K 4 1 09/30/2021     09/30/2021    CO2 26 09/30/2021    AGAP 10 09/30/2021    BUN 8 09/30/2021    CREATININE 0 91 09/30/2021    GLUC 107 09/30/2021    GLUF 104 (H) 09/29/2021    CALCIUM 8 2 (L) 09/30/2021    AST 17 09/29/2021    ALT 33 09/29/2021    ALKPHOS 82 09/29/2021    TP 7 0 09/29/2021    TBILI 1 17 (H) 09/29/2021    EGFR 108 09/30/2021     No results found for: HGBA1C  Lab Results   Component Value Date    FUJ3UEBZYGTP 2 570 08/24/2021     Lab Results   Component Value Date    CHOLESTEROL 161 08/24/2021     Lab Results   Component Value Date    HDL 53 08/24/2021     Lab Results   Component Value Date    TRIG 82 08/24/2021     Lab Results   Component Value Date    LDLCALC 92 08/24/2021

## 2022-06-28 NOTE — PATIENT INSTRUCTIONS
Goals:  Do not skip any meals! Food log (ie ) www myfitnesspal com,sparkpeople  com,loseit com,calorieking  com,etc  baritastic (use skinnytaste  com, dietdoctor  com or smartphone hung [employer] for recipes)  No sugary beverages  At least 64oz of water daily  Increase physical activity by 10 minutes daily   Gradually increase physical activity to a goal of 5 days per week for 30 minutes of MODERATE intensity PLUS 2 days per week of FULL BODY resistance training (use smartphone apps [employer], Home Workout, etc )  Goal protein 150g per day  6942-7405 calories

## 2022-07-21 ENCOUNTER — CONSULT (OUTPATIENT)
Dept: BARIATRICS | Facility: CLINIC | Age: 37
End: 2022-07-21
Payer: COMMERCIAL

## 2022-07-21 VITALS
RESPIRATION RATE: 16 BRPM | DIASTOLIC BLOOD PRESSURE: 92 MMHG | HEIGHT: 69 IN | WEIGHT: 315 LBS | HEART RATE: 92 BPM | SYSTOLIC BLOOD PRESSURE: 146 MMHG | BODY MASS INDEX: 46.65 KG/M2

## 2022-07-21 DIAGNOSIS — K21.9 GASTROESOPHAGEAL REFLUX DISEASE WITHOUT ESOPHAGITIS: ICD-10-CM

## 2022-07-21 DIAGNOSIS — Z01.818 ENCOUNTER FOR OTHER PREPROCEDURAL EXAMINATION: Primary | ICD-10-CM

## 2022-07-21 DIAGNOSIS — G47.33 OBSTRUCTIVE SLEEP APNEA SYNDROME: ICD-10-CM

## 2022-07-21 DIAGNOSIS — J45.909 UNCOMPLICATED ASTHMA, UNSPECIFIED ASTHMA SEVERITY, UNSPECIFIED WHETHER PERSISTENT: ICD-10-CM

## 2022-07-21 DIAGNOSIS — E66.01 MORBID OBESITY WITH BMI OF 60.0-69.9, ADULT (HCC): ICD-10-CM

## 2022-07-21 DIAGNOSIS — E66.01 MORBID (SEVERE) OBESITY DUE TO EXCESS CALORIES (HCC): ICD-10-CM

## 2022-07-21 PROCEDURE — 99204 OFFICE O/P NEW MOD 45 MIN: CPT | Performed by: SURGERY

## 2022-07-21 NOTE — LETTER
July 21, 2022     Cinthya Alfonso MD  [facility] [address]    Patient: Robert Watkins   YOB: 1985   Date of Visit: 7/21/2022       Dear Dr Nain Arreola:    Thank you for referring Robert Watkins to me for evaluation for metabolic and bariatric surgery  Below are my notes for this consultation  If you have questions, please do not hesitate to call me  I look forward to following your patient along with you  Sincerely,        Russell Castillo MD        CC: No Recipients  Russell Castillo MD  7/21/2022 10:10 AM  Sign when Signing Visit      BARIATRIC INITIAL CONSULT - [address] [age] y o  male MRN: [id number]  Unit/Bed#:  Encounter: [id number]      HPI:  Robert Watkins is a 40 y o  male who presents with a longstanding history of morbid obesity and inability to sustain a meaningful weight loss  Patient is present with his girlfriend  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  He desires to pursue metabolic and bariatric surgery to improve his health  He takes famotidine for reflux  Rare NSAIDs  He denies tobacco   He denies a history of DVT/PE  Here today to discuss bariatric options  Visit type: initial visit    Symptoms: inability to loss weight, knee pain and back pain    Associated Symptoms: none    Associated Conditions: sleep apnea and abdominal obesity  Disease Complications: sleep apnea and osteoarthritis  Weight Loss Interest: high    Exercise Frequency:daily  Types of Exercise: walking    Review of Systems   Cardiovascular: Positive for leg swelling  Gastrointestinal:        GERD   Musculoskeletal: Positive for arthralgias and back pain  Neurological: Positive for headaches  All other systems reviewed and are negative        Historical Information   Past Medical History:   Diagnosis Date    Asthma     Pericardial effusion     Sleep apnea     SV[facility]sup[facility]) ([facility]      Past Surgical History:   Procedure Laterality Date    CARDIAC SURGERY       Social History   Social History     Substance and Sexual Activity   Alcohol Use Not Currently     Social History     Substance and Sexual Activity   Drug Use Never     Social History     Tobacco Use   Smoking Status Never Smoker   Smokeless Tobacco Never Used     Family History: obesity, DM II    Meds/Allergies   all medications and allergies reviewed  No Known Allergies    Objective     Current Vitals:   /92 (Cuff Size: Large)   Pulse 92   Resp 16   Ht 5' 9 13" (1 756 m)   Wt (!) 207 kg (456 lb 6 4 oz)   BMI 67 15 kg/m²     Invasive Devices  Report    None                 Physical Exam  Constitutional:       Appearance: Normal appearance  HENT:      Head: Atraumatic  Nose: No rhinorrhea  Eyes:      Extraocular Movements: Extraocular movements intact  Cardiovascular:      Rate and Rhythm: Normal rate  Pulmonary:      Effort: Pulmonary effort is normal  No respiratory distress  Abdominal:      General: Abdomen is flat  There is no distension  Musculoskeletal:         General: Swelling present  Cervical back: Normal range of motion  Skin:     General: Skin is warm and dry  Neurological:      General: No focal deficit present  Mental Status: He is alert and oriented to person, place, and time  Psychiatric:         Mood and Affect: Mood normal          Behavior: Behavior normal          Lab Results: I have personally reviewed pertinent lab results  Imaging: I have personally reviewed pertinent reports  EKG, Pathology, and Other Studies: I have personally reviewed pertinent reports  Assessment/PLAN:    40 y o  yo male with a long standing h/o of obesity and inability to sustain any meaningful weight loss on his own despite several attempts  He is interested in the Laparoscopic sleeve gastrectomy and luisa-en-y gastric bypass      Patient has been counseled about the risk of developing gastroesophageal reflux disease (GERD), worsening of current GERD and/or silent reflux  Patient has also been counseled on the risk of developing Schroeder's esophagus (18%)  As a result the patient may require treatment with medications, further interventions and possibly additional surgery  Patient will require routine endoscopic surveillance to monitor for these possible complications  As a part of his pre op evaluation, he will be referred to a cardiologist and for a sleep evaluation and consult after successfully completing an evaluation with our pre-certification/, registered dietician and licensed clinical   He needs an EGD to evaluate the anatomy of his GI tract  I have spent over 45 minutes with him face to face in the office today discussing his options and details of the surgery  We have seen an animation of the surgery on the computer that illustrates how the operation is done and how the anatomy will be altered with the procedure  Over 50% of this was coordinating care  I have discussed and educated the patient with regards to the components of our multidisciplinary program and the importance of compliance and follow up in the post operative period  He was given the opportunity to ask questions and I have answered all of them  The patient was also instructed with regards to the importance of behavior modification, nutritional counseling, support meeting attendance and lifestyle changes that are important to ensure success  Although there is a great statistical chance of improvement or even resolution of most of his associated comorbidities, the results vary from patient to patient and they largely depend on his commitment and compliance  Weight loss goal will be determined at the time of his evaluation        Henok Lubin MD  7/21/2022  10:07 AM

## 2022-07-21 NOTE — PROGRESS NOTES
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  He desires to pursue metabolic and bariatric surgery to improve his health  He takes famotidine for reflux  Rare NSAIDs  He denies tobacco   He denies a history of DVT/PE  Here today to discuss bariatric options  Visit type: initial visit    Symptoms: inability to loss weight, knee pain and back pain    Associated Symptoms: none    Associated Conditions: sleep apnea and abdominal obesity  Disease Complications: sleep apnea and osteoarthritis  Weight Loss Interest: high    Exercise Frequency:daily  Types of Exercise: walking    Review of Systems   Cardiovascular: Positive for leg swelling  Gastrointestinal:        GERD   Musculoskeletal: Positive for arthralgias and back pain  Neurological: Positive for headaches  All other systems reviewed and are negative        Historical Information   Past Medical History:   Diagnosis Date    Asthma     Pericardial effusion     Sleep apnea     [facility])      Past Surgical History:   Procedure Laterality Date    CARDIAC SURGERY       Social History   Social History     Substance and Sexual Activity   Alcohol Use Not Currently     Social History     Substance and Sexual Activity   Drug Use Never     Social History     Tobacco Use   Smoking Status Never Smoker   Smokeless Tobacco Never Used     Family History: obesity, DM II    Meds/Allergies   all medications and allergies reviewed  No Known Allergies    Objective     Current Vitals:   /92 (Cuff Size: Large)   Pulse 92   Resp 16   Ht 5' 9 13" (1 756 m)   Wt (!) 207 kg (456 lb 6 4 oz)   BMI 67 15 kg/m² Invasive Devices  Report    None                 Physical Exam  Constitutional:       Appearance: Normal appearance  HENT:      Head: Atraumatic  Nose: No rhinorrhea  Eyes:      Extraocular Movements: Extraocular movements intact  Cardiovascular:      Rate and Rhythm: Normal rate  Pulmonary:      Effort: Pulmonary effort is normal  No respiratory distress  Abdominal:      General: Abdomen is flat  There is no distension  Musculoskeletal:         General: Swelling present  Cervical back: Normal range of motion  Skin:     General: Skin is warm and dry  Neurological:      General: No focal deficit present  Mental Status: He is alert and oriented to person, place, and time  Psychiatric:         Mood and Affect: Mood normal          Behavior: Behavior normal          Lab Results: I have personally reviewed pertinent lab results  Imaging: I have personally reviewed pertinent reports  EKG, Pathology, and Other Studies: I have personally reviewed pertinent reports  Assessment/PLAN:    40 y o  yo male with a long standing h/o of obesity and inability to sustain any meaningful weight loss on his own despite several attempts  He is interested in the Laparoscopic sleeve gastrectomy and luisa-en-y gastric bypass  Patient has been counseled about the risk of developing gastroesophageal reflux disease (GERD), worsening of current GERD and/or silent reflux  Patient has also been counseled on the risk of developing Schroeder's esophagus (18%)  As a result the patient may require treatment with medications, further interventions and possibly additional surgery  Patient will require routine endoscopic surveillance to monitor for these possible complications       As a part of his pre op evaluation, he will be referred to a cardiologist and for a sleep evaluation and consult after successfully completing an evaluation with our pre-certification/, jayjay dietician and licensed clinical   He needs an EGD to evaluate the anatomy of his GI tract  I have spent over 45 minutes with him face to face in the office today discussing his options and details of the surgery  We have seen an animation of the surgery on the computer that illustrates how the operation is done and how the anatomy will be altered with the procedure  Over 50% of this was coordinating care  I have discussed and educated the patient with regards to the components of our multidisciplinary program and the importance of compliance and follow up in the post operative period  He was given the opportunity to ask questions and I have answered all of them  The patient was also instructed with regards to the importance of behavior modification, nutritional counseling, support meeting attendance and lifestyle changes that are important to ensure success  Although there is a great statistical chance of improvement or even resolution of most of his associated comorbidities, the results vary from patient to patient and they largely depend on his commitment and compliance  Weight loss goal will be determined at the time of his evaluation        Erickson Farrar MD  7/21/2022  10:07 AM

## 2022-07-29 NOTE — PROGRESS NOTES
Bariatric Behavioral Health Evaluation    Presenting Problem: 40year old male ( 1985) here for behavioral health evaluation  Patient had initial consult with Dr Michelle Villanueva 2022  ***    Is the patient seeking Bariatric Surgery Eval? Yes  If yes how long have you researched this surgery option  ***    Realizes Post- Op Requirements? Yes, but would benefit from more education  Pre-morbid level of function and history of present illness: Diagnosis of hypertension, asthma, GERD, TALIA, SVT; patient reports struggling with *** weight since ***    Psychiatric/Psychological Treatment Diagnosis: Patient denies any current mental health diagnosis or treatment  Patient educated on the benefits of outpatient therapy to the bariatric patient while going through the process of surgery, resource list provided  Outpatient Counselor {YES (DEF)/NO:07254}     Psychiatrist {YES (DEF)/NO:16163}     Have you had Inpatient Treatment? {YES (DEF)/NO:97212}    Family Constellation: Patient currently lives with ***    Trauma/Abuse History:  {HISTORY (Optional):51831}    Additional comments/stressors related to family/relationships/peer support: Patient identifies *** as his support  Patient identifies *** as current stressors  Physical/Psychological Assessment:     Appearance: {APPEARANCE (Optional):10498}  Sociability: {SOCIALBILITY (Optional):04578}  Affect: {AFFECT (Optional):35452}  Mood: {MOOD (Optional):39829}  Thought Process: {THOUGHT PROCESS (Optional):43343}  Speech: {SPEECH (Optional):99077}  Content: {CONTENT (Optional):51743}  Orientation: {ORIENTATION (Optional):75455}  Insight: {INSIGHT:68868}    Risk Assessment:     {RA (Optional):18119}    Recommendations: {RECOMMENDATIONS (Optional):81057}    Risk of Harm to Self or Others: Patient denies SI or HI ***    Observation:     Interviews: This interview only      Access to weapons {YES/NO:}     Weapons secured by ***    Based on the previous information, the client presents the following risk of harm to self or others: low     Note: Patient here for behavioral health evaluation  ***  Patient denies any current substance abuse  Denies tobacco use  Denies alcohol use***  Patient educated on the impact of nicotine and alcohol on the post bariatric patient  Patient agrees to abstain from all substances prior to as well as after surgery  Patient meets criteria for surgery at this program and will follow up with ***  Patient will also need to schedule an EGD following the visit today

## 2022-08-01 ENCOUNTER — PREP FOR PROCEDURE (OUTPATIENT)
Dept: BARIATRICS | Facility: CLINIC | Age: 37
End: 2022-08-01

## 2022-08-01 ENCOUNTER — CLINICAL SUPPORT (OUTPATIENT)
Dept: BARIATRICS | Facility: CLINIC | Age: 37
End: 2022-08-01

## 2022-08-01 VITALS
HEART RATE: 89 BPM | WEIGHT: 315 LBS | SYSTOLIC BLOOD PRESSURE: 158 MMHG | TEMPERATURE: 97.2 F | BODY MASS INDEX: 46.65 KG/M2 | RESPIRATION RATE: 14 BRPM | DIASTOLIC BLOOD PRESSURE: 88 MMHG | HEIGHT: 69 IN

## 2022-08-01 DIAGNOSIS — Z98.84 BARIATRIC SURGERY STATUS: Primary | ICD-10-CM

## 2022-08-01 DIAGNOSIS — Z01.818 PREOP TESTING: ICD-10-CM

## 2022-08-01 DIAGNOSIS — Z71.89 ENCOUNTER FOR PRE-BARIATRIC SURGERY COUNSELING AND EDUCATION: Primary | ICD-10-CM

## 2022-08-01 DIAGNOSIS — E66.01 MORBID OBESITY (HCC): Primary | ICD-10-CM

## 2022-08-01 DIAGNOSIS — E66.01 MORBID (SEVERE) OBESITY DUE TO EXCESS CALORIES (HCC): ICD-10-CM

## 2022-08-01 PROCEDURE — RECHECK

## 2022-08-01 RX ORDER — OMEPRAZOLE 20 MG/1
20 CAPSULE, DELAYED RELEASE ORAL DAILY
COMMUNITY

## 2022-08-01 NOTE — PROGRESS NOTES
Bariatric Nutrition Assessment Note - Initial Visit    Insurance: No required weight checks    Type of surgery    More interested in sleeve vsGastric bypass: laparoscopic   Surgery Date: TBD  Surgeon: Dr lAexis Rush 7/21/2022    Nutrition Assessment   Nilda Torres  40 y o   male   Height: 5'9 1"  Eval Weight: 459 8#   BMI: 67 7  10% Pre-Op Weight loss required: Weigh 414# by Kristakelsey Foster Burleson 46#  Wt with BMI of 25: 169 5#  Pre-Op Excess Wt: 290 3#  BMI to Qualify at 35 = 238#  PMH includes: GERD, Asthma, TALIA  Pt with BMI >55 and  required to lose 10% body weight by DOS  Pt advised to lose weight via healthy eating and exercise  Pt may follow Liver Shrinking diet 2-4 weeks prior to DOS depending on BMI at time  This diet will promote weight loss  Blood pressure 158/88, pulse 89, temperature (!) 97 2 °F (36 2 °C), temperature source Tympanic, resp  rate 14, height 5' 9 13" (1 756 m), weight (!) 209 kg (459 lb 12 8 oz)      Weight History Reason for WLS: Diets don't work and with work and lifestyle - feels best option  Onset of Obesity: Childhood  Family history of obesity: Yes  Wt Loss Attempts: Commercial Programs ([employer], Alber Pardo, etc )  Exercise  Meal Replacements (Medifast, Slim Fast, etc )  Self Created Diets (Portion Control, Healthy Food Choices, etc )  Maximum Wt Lost: 2011 -  Lost 80# ( Calorie counting 2000 - walking    Review of History and Medications   OTC: Vitamin D3  Was taking Zinc  Past Medical History:   Diagnosis Date    Asthma     Pericardial effusion     Sleep apnea     SVT [facility]supraventricular tachycardia) ([facility] [address] )      Past Surgical History:   Procedure Laterality Date    CARDIAC SURGERY       Social History     Socioeconomic History    Marital status: /Civil Union     Spouse name: None    Number of children: None    Years of education: None    Highest education level: None   Occupational History    None   Tobacco Use    Smoking status: Never Smoker    Smokeless tobacco: Never Used   Vaping Use    Vaping Use: Never used   Substance and Sexual Activity    Alcohol use: Not Currently    Drug use: Never    Sexual activity: None   Other Topics Concern    None   Social History Narrative    None     Social Determinants of Health     Financial Resource Strain: Not on file   Food Insecurity: Not on file   Transportation Needs: No Transportation Needs    Lack of Transportation (Medical): No    Lack of Transportation (Non-Medical):  No   Physical Activity: Not on file   Stress: Not on file   Social Connections: Not on file   Intimate Partner Violence: Not on file   Housing Stability: Not on file       Current Outpatient Medications:     Advair Diskus 250-50 MCG/DOSE inhaler, Inhale 1 puff daily, Disp: , Rfl:     albuterol (PROVENTIL HFA,VENTOLIN HFA) 90 mcg/act inhaler, Inhale 2 puffs every 6 (six) hours as needed for wheezing, Disp: , Rfl:     diltiazem (CARDIZEM) 120 MG tablet, Take 120 mg by mouth daily, Disp: , Rfl:     montelukast (SINGULAIR) 10 mg tablet, Take 10 mg by mouth daily at bedtime, Disp: , Rfl:     omeprazole (PriLOSEC) 20 mg delayed release capsule, Take 20 mg by mouth daily, Disp: , Rfl:     Cartia  MG 24 hr capsule, , Disp: , Rfl:   Food Intake and Lifestyle Assessment   Food Intake Assessment completed via usual diet recall - no routine - eats when hungry - 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  Breakfast: 11:30 - cereal  Dinner: 7:30 Packs or Chicken SW from [place] or [employer]  Or ZEN  Or [employer]  Snacks: no usually - sometimes fruit snacks while driving  Beverage intake: water,  Iced Tea - sweetened- 1/2 gallon or Gatorade Zero  - sometimes Renan Tea if needs caffeine  Protein supplement: None  Estimated protein intake per day: 60-70   Estimated fluid intake per day: 4-5 bottle water 64 - 80 oz  Meals eaten away from home: Daily  Typical meal pattern: 2 meals per day and 0-1 snacks per day  Eating Behaviors: Consumption of high calorie/ high fat foods, Consumption of high calorie beverages and Large portion sizes - some mindless at movies  Food allergies or intolerances: No Known Allergies - poss Lactose intolerance  Cultural or Sabianist considerations:  None    Physical Assessment  Physical Activity  Recently moved - has 3 yo daughter    Types of exercise: None - gym 1 yr ago  Current physical limitations: Stairs - stenosis in legs    Psychosocial Assessment   Support systems: significant other (GF)  Parents - mom had sleeve  Socioeconomic factors: None   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    Nutrition Diagnosis  Diagnosis: Overweight / Obesity (NC-3 3)  Related to: Physical inactivity and Excessive energy intake  As Evidenced by: BMI >25     Nutrition Prescription: Recommend the following diet  Low fat, Low sugar, High protein and Regular    Interventions and Teaching   Discussed pre-op and post-op nutrition guidelines  Patient educated and handouts provided    Surgical changes to stomach / GI  Capacity of post-surgery stomach  Diet progression  Adequate hydration  Sugar and fat restriction to decrease "dumping syndrome"  Fat restriction to decrease steatorrhea  Expected weight loss  Weight loss plateaus/ possibility of weight regain  Exercise  Suggestions for pre-op diet  Nutrition considerations after surgery  Protein supplements  Meal planning and preparation  Appropriate carbohydrate, protein, and fat intake, and food/fluid choices to maximize safe weight loss, nutrient intake, and tolerance   Dietary and lifestyle changes  Possible problems with poor eating habits  Intuitive eating  Techniques for self monitoring and keeping daily food journal  Potential for food intolerance after surgery, and ways to deal with them including: lactose intolerance, nausea, reflux, vomiting, diarrhea, food intolerance, appetite changes, gas  Vitamin / Mineral supplementation of Multivitamin with minerals, Calcium, Vitamin B12, Iron and Vitamin D    Education provided to: patient    Barriers to learning: No barriers identified  Readiness to change: preparation    Prior research on procedure: internet, discussed with provider and friends or family    Comprehension: verbalizes understanding     Expected Compliance: good  Recommendations  Pt is an appropriate candidate for surgery   Yes    Evaluation / Monitoring  Dietitian to Monitor: Eating pattern as discussed Tolerance of nutrition prescription Body weight Lab values Physical activity Bowel pattern    Goals  Eliminate sugar sweetened beverages, Food journal, Exercise 30 minutes 5 times per week, Complete lession plans 1-6, Eat 3 meals per day and Eliminate mindless snacking   Follow Pre-Surgery guidelines  > Trial Baritastic for food logging  Fat Secret, [employer]   > Establish regular meal pattern of 3 meals- include fruits, vegetables and whole grains  > No skipping meals-  > Focus on protein - include lean protein at each meal and snack - Can use protein drink as meal replacement  > Decrease portions  > Limit processed foods, fast foods and dining away from home  > Include meal prepping - pack food for meals or snacks  > Limit snacks - healthier choices and portion; avoid grazing  > Slow pace of eating and drinking - practice 30/60 minute rule  > Reduce caffeine- eliminate before pre-op diet ( iced tea)- subst SF caffeine free beverage)  > Reduce/eliminate carbonation before pre-op diet  > Maintain  water intake -> 64 oz  > Maintain physical activity/establish exercise regimen   > Start multi vitamin and continue additional Vitamin D 2000IU  Work on skills to cope with emotional eating/mindfull eating  Meet pre-op weight of 10%/46 # as BMI>55  Weigh 414# by DOS  F/U next month with bariatric provider      Time Spent:   1 Hour

## 2022-08-01 NOTE — PROGRESS NOTES
Bariatric Behavioral Health Evaluation    Presenting Problem: 40year old male ( 1985) here for behavioral health evaluation  Patient had initial consult with Dr Monty Christianson 2022  Patient is wanting to improve his health and be able to give him more mobility to be able to do more at work  Is the patient seeking Bariatric Surgery Eval? Yes  If yes how long have you researched this surgery option  Patient recently started looking into bariatric surgery  Has talked to coworkers that have gone through bariatric surgery and also saw his mom go through the process 6-7 years ago  Realizes Post- Op Requirements? Yes, but would benefit from more education  Pre-morbid level of function and history of present illness: Diagnosis of hypertension, asthma, GERD, TALIA, SVT; patient reports struggling with his weight since he went through puberty  Psychiatric/Psychological Treatment Diagnosis: Patient denies any current mental health diagnosis or treatment  Patient educated on the benefits of outpatient therapy to the bariatric patient while going through the process of surgery, resource list provided  Outpatient Counselor No     Psychiatrist No     Have you had Inpatient Treatment? 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    Additional comments/stressors related to family/relationships/peer support: Patient identifies his girlfriend, mom, brother, sister, and girlfriend's mom as his support  Patient identifies moving as a current stressor      Physical/Psychological Assessment:     Appearance: appropriate  Sociability: average  Affect: appropriate  Mood: calm  Thought Process: coherent  Speech: normal  Content: no impairment  Orientation: person  Yes , place  Yes , time Yes , normal attention span  Yes , normal memory  Yes   and normal judgement  Yes   Insight: emotional  good    Risk Assessment:     none    Recommendations: Recommended for surgery  yes    Risk of Harm to Self or Others: Patient denies SI or HI     Observation:     Interviews: This interview only  Based on the previous information, the client presents the following risk of harm to self or others: low     Note: Patient here for behavioral health evaluation  Patient denies any current mental health diagnosis or treatment  Patient educated on the benefits of outpatient therapy to the bariatric patient while going through the process of surgery, resource list provided  Patient denies any current substance abuse  Denies tobacco use  Denies alcohol use  Patient educated on the impact of nicotine and alcohol on the post bariatric patient  Patient agrees to abstain from all substances prior to as well as after surgery  Patient meets criteria for surgery at this program and will follow up with RD next month  Patient will also need to schedule an EGD following the visit today    Osito Nguyen LCSW

## 2022-08-26 ENCOUNTER — ANESTHESIA (OUTPATIENT)
Dept: GASTROENTEROLOGY | Facility: HOSPITAL | Age: 37
End: 2022-08-26

## 2022-08-26 ENCOUNTER — ANESTHESIA EVENT (OUTPATIENT)
Dept: GASTROENTEROLOGY | Facility: HOSPITAL | Age: 37
End: 2022-08-26

## 2022-08-26 ENCOUNTER — HOSPITAL ENCOUNTER (OUTPATIENT)
Dept: GASTROENTEROLOGY | Facility: HOSPITAL | Age: 37
Setting detail: OUTPATIENT SURGERY
End: 2022-08-26
Attending: SURGERY
Payer: COMMERCIAL

## 2022-08-26 VITALS
HEIGHT: 70 IN | BODY MASS INDEX: 45.1 KG/M2 | RESPIRATION RATE: 16 BRPM | OXYGEN SATURATION: 96 % | HEART RATE: 77 BPM | SYSTOLIC BLOOD PRESSURE: 157 MMHG | TEMPERATURE: 98.3 F | DIASTOLIC BLOOD PRESSURE: 97 MMHG | WEIGHT: 315 LBS

## 2022-08-26 DIAGNOSIS — E66.01 MORBID OBESITY (HCC): ICD-10-CM

## 2022-08-26 PROCEDURE — 88342 IMHCHEM/IMCYTCHM 1ST ANTB: CPT | Performed by: PATHOLOGY

## 2022-08-26 PROCEDURE — 88305 TISSUE EXAM BY PATHOLOGIST: CPT | Performed by: PATHOLOGY

## 2022-08-26 PROCEDURE — 43239 EGD BIOPSY SINGLE/MULTIPLE: CPT | Performed by: SURGERY

## 2022-08-26 RX ORDER — KETAMINE HYDROCHLORIDE 50 MG/ML
INJECTION, SOLUTION, CONCENTRATE INTRAMUSCULAR; INTRAVENOUS AS NEEDED
Status: DISCONTINUED | OUTPATIENT
Start: 2022-08-26 | End: 2022-08-26

## 2022-08-26 RX ORDER — LIDOCAINE HYDROCHLORIDE 10 MG/ML
0.5 INJECTION, SOLUTION EPIDURAL; INFILTRATION; INTRACAUDAL; PERINEURAL ONCE AS NEEDED
Status: DISCONTINUED | OUTPATIENT
Start: 2022-08-26 | End: 2022-08-30 | Stop reason: HOSPADM

## 2022-08-26 RX ORDER — LIDOCAINE HYDROCHLORIDE 20 MG/ML
INJECTION, SOLUTION EPIDURAL; INFILTRATION; INTRACAUDAL; PERINEURAL AS NEEDED
Status: DISCONTINUED | OUTPATIENT
Start: 2022-08-26 | End: 2022-08-26

## 2022-08-26 RX ORDER — LIDOCAINE HYDROCHLORIDE 10 MG/ML
0.5 INJECTION, SOLUTION EPIDURAL; INFILTRATION; INTRACAUDAL; PERINEURAL ONCE AS NEEDED
Status: CANCELLED | OUTPATIENT
Start: 2022-08-26

## 2022-08-26 RX ORDER — SODIUM CHLORIDE, SODIUM LACTATE, POTASSIUM CHLORIDE, CALCIUM CHLORIDE 600; 310; 30; 20 MG/100ML; MG/100ML; MG/100ML; MG/100ML
125 INJECTION, SOLUTION INTRAVENOUS CONTINUOUS
Status: DISCONTINUED | OUTPATIENT
Start: 2022-08-26 | End: 2022-08-30 | Stop reason: HOSPADM

## 2022-08-26 RX ORDER — GLYCOPYRROLATE 0.2 MG/ML
INJECTION INTRAMUSCULAR; INTRAVENOUS AS NEEDED
Status: DISCONTINUED | OUTPATIENT
Start: 2022-08-26 | End: 2022-08-26

## 2022-08-26 RX ORDER — SODIUM CHLORIDE, SODIUM LACTATE, POTASSIUM CHLORIDE, CALCIUM CHLORIDE 600; 310; 30; 20 MG/100ML; MG/100ML; MG/100ML; MG/100ML
125 INJECTION, SOLUTION INTRAVENOUS CONTINUOUS
Status: CANCELLED | OUTPATIENT
Start: 2022-08-26

## 2022-08-26 RX ORDER — PROPOFOL 10 MG/ML
INJECTION, EMULSION INTRAVENOUS AS NEEDED
Status: DISCONTINUED | OUTPATIENT
Start: 2022-08-26 | End: 2022-08-26

## 2022-08-26 RX ADMIN — PROPOFOL 150 MG: 10 INJECTION, EMULSION INTRAVENOUS at 14:26

## 2022-08-26 RX ADMIN — GLYCOPYRROLATE 0.1 MG: 0.2 INJECTION, SOLUTION INTRAMUSCULAR; INTRAVENOUS at 14:24

## 2022-08-26 RX ADMIN — KETAMINE HYDROCHLORIDE 30 MG: 50 INJECTION INTRAMUSCULAR; INTRAVENOUS at 14:26

## 2022-08-26 RX ADMIN — SODIUM CHLORIDE, SODIUM LACTATE, POTASSIUM CHLORIDE, AND CALCIUM CHLORIDE 125 ML/HR: .6; .31; .03; .02 INJECTION, SOLUTION INTRAVENOUS at 14:21

## 2022-08-26 RX ADMIN — LIDOCAINE HYDROCHLORIDE 100 MG: 20 INJECTION, SOLUTION EPIDURAL; INFILTRATION; INTRACAUDAL; PERINEURAL at 14:26

## 2022-08-26 NOTE — ANESTHESIA POSTPROCEDURE EVALUATION
Post-Op Assessment Note    CV Status:  Stable    Pain management: adequate     Mental Status:  Alert and awake   Hydration Status:  Euvolemic   PONV Controlled:  Controlled   Airway Patency:  Patent      Post Op Vitals Reviewed: Yes      Staff: CRNA         No complications documented      BP   146/82   Temp   98   Pulse  76   Resp   20   SpO2   100

## 2022-08-26 NOTE — H&P
This is a 40 y o  male with a history of morbid obesity and Body mass index is 64 63 kg/m²  Here for an EGD to evaluate the anatomy of the GI tract and to rule out the presence of H  pylori  Physical Exam    /89   Pulse 78   Temp 97 6 °F (36 4 °C) (Temporal)   Resp 16   Ht 5' 10" (1 778 m)   Wt (!) 204 kg (450 lb 6 4 oz)   SpO2 94%   BMI 64 63 kg/m²    AAOx3  RR  CTA B  Abdomen ND  Benign  A/P:    This is a 40 y o  male with a history of morbid obesity and Body mass index is 64 63 kg/m²       Will proceed with the EGD and biopsies        Pb Damon MD, FACS, FASMBS  8/26/2022  2:20 PM

## 2022-08-26 NOTE — ANESTHESIA PREPROCEDURE EVALUATION
Procedure:  EGD    Relevant Problems   CARDIO   (+) SVT (supraventricular tachycardia) (HCC)      GI/HEPATIC   (+) GERD (gastroesophageal reflux disease)      PULMONARY   (+) Asthma   (+) Obstructive sleep apnea syndrome        Physical Exam    Airway    Mallampati score: III  TM Distance: >3 FB  Neck ROM: full     Dental   No notable dental hx     Cardiovascular      Pulmonary      Other Findings        Anesthesia Plan  ASA Score- 3     Anesthesia Type- IV sedation with anesthesia with ASA Monitors  Additional Monitors:   Airway Plan:           Plan Factors-Exercise tolerance (METS): >4 METS  Chart reviewed  Patient summary reviewed  Patient is not a current smoker  There is medical exclusion for perioperative obstructive sleep apnea risk education  Induction- intravenous  Postoperative Plan-     Informed Consent- Anesthetic plan and risks discussed with patient  I personally reviewed this patient with the CRNA  Discussed and agreed on the Anesthesia Plan with the CRNA  Son Eubanks

## 2022-08-31 PROCEDURE — 88342 IMHCHEM/IMCYTCHM 1ST ANTB: CPT | Performed by: PATHOLOGY

## 2022-08-31 PROCEDURE — 88305 TISSUE EXAM BY PATHOLOGIST: CPT | Performed by: PATHOLOGY

## 2022-09-01 ENCOUNTER — OFFICE VISIT (OUTPATIENT)
Dept: BARIATRICS | Facility: CLINIC | Age: 37
End: 2022-09-01

## 2022-09-01 VITALS — BODY MASS INDEX: 66 KG/M2 | WEIGHT: 315 LBS

## 2022-09-01 DIAGNOSIS — E66.01 MORBID (SEVERE) OBESITY DUE TO EXCESS CALORIES (HCC): Primary | ICD-10-CM

## 2022-09-01 PROCEDURE — RECHECK

## 2022-09-01 NOTE — PROGRESS NOTES
Bariatric Nutrition Assessment Note -    Insurance: No required weight checks    Type of surgery    More interested in sleeve vsGastric bypass: laparoscopic   Surgery Date: TBD  Surgeon: Dr Julius Mendez 7/21/2022    Nutrition Assessment   Norma Leslie  40 y o   male   Height: 5'9 1"  Weight: 460#  Eval Weight: 459 8#   BMI: 67 7  10% Pre-Op Weight loss required: Weigh 414# by Charlotte Aguilar 46#  Wt with BMI of 25: 169 5#  Pre-Op Excess Wt: 290 3#  BMI to Qualify at 35 = 238#  PMH includes: GERD, Asthma, TALIA  Pt with BMI >55 and  required to lose 10% body weight by DOS  Pt advised to lose weight via healthy eating and exercise  Pt may follow Liver Shrinking diet 2-4 weeks prior to DOS depending on BMI at time  This diet will promote weight loss  There were no vitals taken for this visit      Weight History Reason for WLS: Diets don't work and with work and lifestyle - feels best option  Onset of Obesity: Childhood  Family history of obesity: Yes  Wt Loss Attempts: Commercial Programs ([employer], Megathread, etc )  Exercise  Meal Replacements (Medifast, Slim Fast, etc )  Self Created Diets (Portion Control, Healthy Food Choices, etc )  Maximum Wt Lost: 2011 -  Lost 80# ( Calorie counting 2000 - walking    Review of History and Medications   OTC: Vitamin D3  Was taking Zinc  Past Medical History:   Diagnosis Date    Asthma     CPAP (continuous positive airway pressure) dependence     Pericardial effusion     Sleep apnea     SVT (supraventricular tachycardia) (HCC)      Past Surgical History:   Procedure Laterality Date    CARDIAC SURGERY       Social History     Socioeconomic History    Marital status: /Civil Union     Spouse name: Not on file    Number of children: Not on file    Years of education: Not on file    Highest education level: Not on file   Occupational History    Not on file   Tobacco Use    Smoking status: Never Smoker    Smokeless tobacco: Never Used   Vaping Use    Vaping Use: Never used   Substance and Sexual Activity    Alcohol use: Yes     Comment: rarely    Drug use: Never    Sexual activity: Not on file   Other Topics Concern    Not on file   Social History Narrative    Not on file     Social Determinants of Health     Financial Resource Strain: Not on file   Food Insecurity: Not on file   Transportation Needs: No Transportation Needs    Lack of Transportation (Medical): No    Lack of Transportation (Non-Medical):  No   Physical Activity: Not on file   Stress: Not on file   Social Connections: Not on file   Intimate Partner Violence: Not on file   Housing Stability: Not on file       Current Outpatient Medications:     Advair Diskus 250-50 MCG/DOSE inhaler, Inhale 1 puff daily, Disp: , Rfl:     albuterol (PROVENTIL HFA,VENTOLIN HFA) 90 mcg/act inhaler, Inhale 2 puffs every 6 (six) hours as needed for wheezing, Disp: , Rfl:     Cartia  MG 24 hr capsule, , Disp: , Rfl:     diltiazem (CARDIZEM) 120 MG tablet, Take 120 mg by mouth daily, Disp: , Rfl:     montelukast (SINGULAIR) 10 mg tablet, Take 10 mg by mouth daily at bedtime, Disp: , Rfl:     omeprazole (PriLOSEC) 20 mg delayed release capsule, Take 20 mg by mouth daily, Disp: , Rfl:   Food Intake and Lifestyle Assessment   Food Intake Assessment completed via usual diet recall - no routine - eats when hungry - 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  Breakfast: 11:30 - cereal  Dinner: 7:30 Packs or Chicken SW from [employer] or [employer]  Or [employer]  Or [employer]  Snacks: no usually - sometimes fruit snacks while driving  Beverage intake: water,  Iced Tea - sweetened- 1/2 gallon or Gatorade Zero  - sometimes Renan Tea if needs caffeine  Protein supplement: None  Estimated protein intake per day: 60-70   Estimated fluid intake per day: 4-5 bottle water 64 - 80 oz  Meals eaten away from home: Daily  Typical meal pattern: 2 meals per day and 0-1 snacks per day  Eating Behaviors: Consumption of high calorie/ high fat foods, Consumption of high calorie beverages and Large portion sizes - some mindless at movies  Food allergies or intolerances: No Known Allergies - poss Lactose intolerance  Cultural or Restorationism considerations:  None    Physical Assessment  Physical Activity  Recently moved - has 3 yo daughter    Types of exercise: None - gym 1 yr ago  Current physical limitations: Stairs - stenosis in legs    Psychosocial Assessment   Support systems: significant other (GF)  Parents - mom had sleeve  Socioeconomic factors: None   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    Nutrition Diagnosis  Diagnosis: Overweight / Obesity (NC-3 3)  Related to: Physical inactivity and Excessive energy intake  As Evidenced by: BMI >25     Nutrition Prescription: Recommend the following diet  Low fat, Low sugar, High protein and Regular    Interventions and Teaching   Discussed pre-op and post-op nutrition guidelines  Patient educated and handouts provided    Surgical changes to stomach / GI  Capacity of post-surgery stomach  Diet progression  Adequate hydration  Sugar and fat restriction to decrease "dumping syndrome"  Fat restriction to decrease steatorrhea  Expected weight loss  Weight loss plateaus/ possibility of weight regain  Exercise  Suggestions for pre-op diet  Nutrition considerations after surgery  Protein supplements  Meal planning and preparation  Appropriate carbohydrate, protein, and fat intake, and food/fluid choices to maximize safe weight loss, nutrient intake, and tolerance   Dietary and lifestyle changes  Possible problems with poor eating habits  Intuitive eating  Techniques for self monitoring and keeping daily food journal  Potential for food intolerance after surgery, and ways to deal with them including: lactose intolerance, nausea, reflux, vomiting, diarrhea, food intolerance, appetite changes, gas  Vitamin / Mineral supplementation of Multivitamin with minerals, Calcium, Vitamin B12, Iron and Vitamin D    Education provided to: patient    Barriers to learning: No barriers identified  Readiness to change: preparation    Prior research on procedure: internet, discussed with provider and friends or family    Comprehension: verbalizes understanding     Expected Compliance: good  Recommendations  Pt is an appropriate candidate for surgery  Yes    Evaluation / Monitoring  Dietitian to Monitor: Eating pattern as discussed Tolerance of nutrition prescription Body weight Lab values Physical activity Bowel pattern    PreOp Monthly Weight Check 9/1/2022  Weight stable  Working on pre/post op guidelines  Difficulty with 30/60 but trying  Bringing healthy snacks to work and eating out less  Fluid intake adequate - drinking more water - less tea and gatorade  Activity same - keeps busy but no formal exercise  Questions answered during discussion  Workflow reviewed and updated  Workflow:   Psych and/or D+A Clearance: N/A   PCP Letter: 7/25/2022   Support Group:  To attend   Surgeon Appt  7/21/2022   EGD Completed 8/26/2022   Cardiac Risk Assessment To schedule   Sleep Studies On CPAP   Blood work To complete    Nicotine test N/A   6 Month Pre-Operative Program: N/A  > Weight Loss 10% (Weigh 414# by DOS) - suggest visit wit Dr Geraldine Chavira - pt to decide after October appt      Goals  Eliminate sugar sweetened beverages, Food journal, Exercise 30 minutes 5 times per week, Complete lession plans 1-6, Eat 3 meals per day and Eliminate mindless snacking   Follow Pre-Surgery guidelines  > Trial Baritastic for food logging  Fat Secret, [employer]   > Establish regular meal pattern of 3 meals- include fruits, vegetables and whole grains  > No skipping meals-  > Focus on protein - include lean protein at each meal and snack - Can use protein drink as meal replacement  > Decrease portions  > Limit processed foods, fast foods and dining away from home  > Include meal prepping - pack food for meals or snacks  > Limit snacks - healthier choices and portion; avoid grazing  > Slow pace of eating and drinking - practice 30/60 minute rule  > Reduce caffeine- eliminate before pre-op diet ( iced tea)- subst SF caffeine free beverage)  > Reduce/eliminate carbonation before pre-op diet  > Maintain  water intake -> 64 oz  > Maintain physical activity/establish exercise regimen   > Start multi vitamin and continue additional Vitamin D 2000IU  Work on skills to cope with emotional eating/mindfull eating  Meet pre-op weight of 10%/46 # as BMI>55  Weigh 414# by DOS  F/U next month with bariatric provider      Time Spent: 30 minutes

## 2022-10-04 ENCOUNTER — OFFICE VISIT (OUTPATIENT)
Dept: BARIATRICS | Facility: CLINIC | Age: 37
End: 2022-10-04

## 2022-10-04 VITALS — WEIGHT: 315 LBS | BODY MASS INDEX: 65.37 KG/M2

## 2022-10-04 DIAGNOSIS — E66.01 MORBID (SEVERE) OBESITY DUE TO EXCESS CALORIES (HCC): Primary | ICD-10-CM

## 2022-10-04 PROCEDURE — RECHECK

## 2022-10-04 NOTE — PROGRESS NOTES
Patient presents for pre-op check in, current weight 455 6lbs  Eating behaviors/food choices: Stopped drinking soda since last visit and trying to have regular meals  Noticing more cravings now that he's allowing himself to get hungry, history of snacking and grazing through the day  Discussed mindfulness, encoragin him to sit with and evaluate feelings of hunger and satiation along with cravings and how to manage them without necessarily giving in when it won't help health and weight management goals  Using protein bars and shakes as quick food options  Says he's "snacking" for breakfast and lunch, dinner is a "meal", the difference is the portion sizes  Recommended he be mindful of what he's choosing for meals versus snacks and regardless he's portioning to manage intake  Activity/Exercise:  Not working out right now, busy with work  Doing a lot of work around his house, has an active job    Sleep/Rest:  Sleep is broken, trying to use CPAP, mask is not an issue but is difficult because his sleep is not consistent  He works a night shift, sleeps when he gets home but then has to go back to sleep after taking kids to school in the morning  Has tried to take machine with him to work to use while napping during downtime but isn't napping long enough to warrant the extensive set up  Mental Health/Wellness:  No mood changes, some stress with limited time, trying to balance work and home projects after moving    Denies emotional eating, skipping meals instead    Workflow review:    Labs and PCP: needs both, given fax number to confirm with PCP to send over note/letter  Psych and EGD: no eval, EGD done 8/26  Nicotine: NA  Support Group: needs to complete, schedule given   Cardiology: reminded to schedule  Sleep: has CPAP machine    Goals:    - continue three meals a day, be mindful of body cues along with navigating cravings  - manage portions regardless of snack or meals  - continue efforts to manage sleep, use CPAP machine      Next Appointment:  With RD on 11/1

## 2022-11-10 ENCOUNTER — HOSPITAL ENCOUNTER (EMERGENCY)
Facility: HOSPITAL | Age: 37
Discharge: HOME | End: 2022-11-10
Attending: EMERGENCY MEDICINE

## 2022-11-10 ENCOUNTER — APPOINTMENT (EMERGENCY)
Dept: RADIOLOGY | Facility: HOSPITAL | Age: 37
End: 2022-11-10

## 2022-11-10 VITALS
TEMPERATURE: 98.5 F | RESPIRATION RATE: 25 BRPM | OXYGEN SATURATION: 95 % | SYSTOLIC BLOOD PRESSURE: 180 MMHG | DIASTOLIC BLOOD PRESSURE: 96 MMHG | HEART RATE: 80 BPM

## 2022-11-10 DIAGNOSIS — S83.92XA SPRAIN OF LEFT KNEE, UNSPECIFIED LIGAMENT, INITIAL ENCOUNTER: Primary | ICD-10-CM

## 2022-11-10 RX ORDER — KETOROLAC TROMETHAMINE 30 MG/ML
15 INJECTION, SOLUTION INTRAMUSCULAR; INTRAVENOUS ONCE
Status: DISCONTINUED | OUTPATIENT
Start: 2022-11-10 | End: 2022-11-10 | Stop reason: HOSPADM

## 2022-11-10 RX ADMIN — TETANUS TOXOID, REDUCED DIPHTHERIA TOXOID AND ACELLULAR PERTUSSIS VACCINE, ADSORBED 0.5 ML: 5; 2.5; 8; 8; 2.5 SUSPENSION INTRAMUSCULAR at 04:47

## 2022-11-10 NOTE — Clinical Note
Lauren Cervantes was seen and treated in our emergency department on 11/10/2022  Diagnosis: Left knee sprain    Sarai Mendez    He may return on this date: 11/11/2022         If you have any questions or concerns, please don't hesitate to call        Nilda Ying PA-C    ______________________________           _______________          _______________  Hospital Representative                              Date                                Time

## 2022-11-15 ENCOUNTER — OFFICE VISIT (OUTPATIENT)
Dept: OBGYN CLINIC | Facility: CLINIC | Age: 37
End: 2022-11-15

## 2022-11-15 VITALS
BODY MASS INDEX: 45.1 KG/M2 | DIASTOLIC BLOOD PRESSURE: 92 MMHG | HEIGHT: 70 IN | RESPIRATION RATE: 20 BRPM | SYSTOLIC BLOOD PRESSURE: 150 MMHG | OXYGEN SATURATION: 98 % | HEART RATE: 79 BPM | WEIGHT: 315 LBS

## 2022-11-15 DIAGNOSIS — M25.562 ACUTE PAIN OF LEFT KNEE: ICD-10-CM

## 2022-11-15 NOTE — PROGRESS NOTES
Subjective:    Chief Complaint   Patient presents with   • Left Knee - Swelling, Pain       Josh Axel is a 40 y o  male complains of left knee pain  Onset of the symptoms was a week ago  Mechanism of injury: called for cardiac arrest to patients home trailer  While at trailer, his right leg fell through the tiling and left knee bent and landed directly on it with slight twisting mechanism  Aggravating factors: twisting  Treatment to date: rest and ace wraps  Symptoms have progressed to a point and plateaued  The following portions of the patient's history were reviewed and updated as appropriate: allergies, current medications, past family history, past medical history, past social history, past surgical history and problem list     Occupation:   [employer]    Review of Systems   Constitutional: Negative for fever  HENT: Negative for dental problem and headaches  Eyes: Negative for vision loss  Respiratory: Negative for cough and shortness of breath  Cardiovascular: Negative for leg swelling and palpitations  Gastrointestinal: Negative for constipation and diarrhea  Genitourinary: Negative for bladder incontinence and difficulty urinating  Musculoskeletal: Negative for back pain and difficulty walking  Skin: Negative for rash and ulcer  Neurological: Negative for dizziness and headaches  Hem/Lymph/Immuno: Negative for blood clots  Does not bruise/bleed easily  Psychiatric/Behavioral: Negative for confusion  Objective:  /92   Pulse 79   Resp 20   Ht 5' 10" (1 778 m)   Wt (!) 205 kg (452 lb 3 2 oz)   SpO2 98%   BMI 64 88 kg/m²      Knee exam was slightly limited secondary to patient habitus  Musculoskeletal: Left KNEE EXAM  Gait: limping gait negative  Inspection:No erythema, no induration  There is no gross deformity  Venous stasis changes appreciated bilaterally  Palpation: Swelling: negative  Effusion: negative   Medial joint line TTP: positive  Lateral joint line TTP: negative  TTP patella Positive  ROM: Full flexion and extension  Pain with full flexion of the knee  Special tests: Raffi's: positive  Instability to varus/valgus stress: negative with no pain elicited  Anterior Drawer: negative Lachman's test: negative  Posterior Drawer: negative  Posterior sag test: negative   Crepitus: negative        Imaging:  LEFT KNEE     INDICATION:   fall      COMPARISON:  None     VIEWS:  XR KNEE 4+ VW LEFT INJURY         FINDINGS:     There is no acute fracture or dislocation      There is no joint effusion      Mild medial compartment osteoarthritis is present      No lytic or blastic osseous lesion      Soft tissues are unremarkable      IMPRESSION:     No acute osseous abnormality  Assessment/Plan:  1  Acute pain of left knee  - Ambulatory Referral to Orthopedic Surgery  - Ambulatory Referral to Physical Therapy; Future      > 30 min devoted to review of previous, pertinent medical records, imaging, discussion of treatment options, counseling and documentation  Imaging independently reviewed and discussed with patient  Degenerative changes noted, no acute fractures appreciated  Clinical impression is that patient left knee pain is multifactorial secondary to contusion of the patella addition to concern for possible meniscus injury  Given lack of mechanical symptoms on exam my recommendation is that patient follow-up with physical therapy for 4-5 weeks and return for follow-up visit re-evaluation in 6 weeks  If the pain is still persisting at that time we will order MRI for further evaluation  Return precautions provided for patient advised to watch out for a locking catching or giving out sensation    Patient is able to return to work without restriction  Will follow-up in 6 weeks for re-evaluation

## 2022-12-13 ENCOUNTER — TELEPHONE (OUTPATIENT)
Dept: BARIATRICS | Facility: CLINIC | Age: 37
End: 2022-12-13

## 2022-12-13 NOTE — TELEPHONE ENCOUNTER
Called pt re: missed appt on 11/1/2022  Left message for pt to reschedule appointment if he desires to remain in surgical process

## 2022-12-23 ENCOUNTER — TELEPHONE (OUTPATIENT)
Dept: BARIATRICS | Facility: CLINIC | Age: 37
End: 2022-12-23

## 2022-12-29 ENCOUNTER — TELEPHONE (OUTPATIENT)
Dept: BARIATRICS | Facility: CLINIC | Age: 37
End: 2022-12-29

## 2023-02-16 ENCOUNTER — HOSPITAL ENCOUNTER (EMERGENCY)
Facility: HOSPITAL | Age: 38
Discharge: HOME | End: 2023-02-17
Attending: EMERGENCY MEDICINE

## 2023-02-16 DIAGNOSIS — I47.1 SVT (SUPRAVENTRICULAR TACHYCARDIA) (HCC): Primary | ICD-10-CM

## 2023-02-16 LAB
ALBUMIN SERPL BCP-MCNC: 4 G/DL (ref 3.5–5)
ALP SERPL-CCNC: 100 U/L (ref 34–104)
ALT SERPL W P-5'-P-CCNC: 26 U/L (ref 7–52)
ANION GAP SERPL CALCULATED.3IONS-SCNC: 8 MMOL/L (ref 4–13)
AST SERPL W P-5'-P-CCNC: 23 U/L (ref 13–39)
BASOPHILS # BLD AUTO: 0.05 THOUSANDS/ÂΜL (ref 0–0.1)
BASOPHILS NFR BLD AUTO: 0 % (ref 0–1)
BILIRUB SERPL-MCNC: 0.57 MG/DL (ref 0.2–1)
BUN SERPL-MCNC: 14 MG/DL (ref 5–25)
CALCIUM SERPL-MCNC: 9.4 MG/DL (ref 8.4–10.2)
CARDIAC TROPONIN I PNL SERPL HS: 12 NG/L
CHLORIDE SERPL-SCNC: 104 MMOL/L (ref 96–108)
CO2 SERPL-SCNC: 27 MMOL/L (ref 21–32)
CREAT SERPL-MCNC: 0.99 MG/DL (ref 0.6–1.3)
EOSINOPHIL # BLD AUTO: 0.28 THOUSAND/ÂΜL (ref 0–0.61)
EOSINOPHIL NFR BLD AUTO: 2 % (ref 0–6)
ERYTHROCYTE [DISTWIDTH] IN BLOOD BY AUTOMATED COUNT: 14.4 % (ref 11.6–15.1)
GFR SERPL CREATININE-BSD FRML MDRD: 96 ML/MIN/1.73SQ M
GLUCOSE SERPL-MCNC: 91 MG/DL (ref 65–140)
HCT VFR BLD AUTO: 45.5 % (ref 36.5–49.3)
HGB BLD-MCNC: 14.8 G/DL (ref 12–17)
IMM GRANULOCYTES # BLD AUTO: 0.06 THOUSAND/UL (ref 0–0.2)
IMM GRANULOCYTES NFR BLD AUTO: 1 % (ref 0–2)
LYMPHOCYTES # BLD AUTO: 1.91 THOUSANDS/ÂΜL (ref 0.6–4.47)
LYMPHOCYTES NFR BLD AUTO: 17 % (ref 14–44)
MAGNESIUM SERPL-MCNC: 1.8 MG/DL (ref 1.9–2.7)
MCH RBC QN AUTO: 27.6 PG (ref 26.8–34.3)
MCHC RBC AUTO-ENTMCNC: 32.5 G/DL (ref 31.4–37.4)
MCV RBC AUTO: 85 FL (ref 82–98)
MONOCYTES # BLD AUTO: 1.08 THOUSAND/ÂΜL (ref 0.17–1.22)
MONOCYTES NFR BLD AUTO: 9 % (ref 4–12)
NEUTROPHILS # BLD AUTO: 8.09 THOUSANDS/ÂΜL (ref 1.85–7.62)
NEUTS SEG NFR BLD AUTO: 71 % (ref 43–75)
NRBC BLD AUTO-RTO: 0 /100 WBCS
PHOSPHATE SERPL-MCNC: 3.4 MG/DL (ref 2.7–4.5)
PLATELET # BLD AUTO: 292 THOUSANDS/UL (ref 149–390)
PMV BLD AUTO: 10.1 FL (ref 8.9–12.7)
POTASSIUM SERPL-SCNC: 4 MMOL/L (ref 3.5–5.3)
PROT SERPL-MCNC: 8 G/DL (ref 6.4–8.4)
RBC # BLD AUTO: 5.36 MILLION/UL (ref 3.88–5.62)
SODIUM SERPL-SCNC: 139 MMOL/L (ref 135–147)
WBC # BLD AUTO: 11.47 THOUSAND/UL (ref 4.31–10.16)

## 2023-02-16 RX ORDER — MAGNESIUM SULFATE HEPTAHYDRATE 40 MG/ML
2 INJECTION, SOLUTION INTRAVENOUS ONCE
Status: COMPLETED | OUTPATIENT
Start: 2023-02-16 | End: 2023-02-17

## 2023-02-16 RX ORDER — ADENOSINE 3 MG/ML
12 INJECTION INTRAVENOUS ONCE
Status: COMPLETED | OUTPATIENT
Start: 2023-02-16 | End: 2023-02-16

## 2023-02-16 RX ADMIN — ADENOSINE 12 MG: 3 INJECTION INTRAVENOUS at 21:45

## 2023-02-16 RX ADMIN — SODIUM CHLORIDE 1000 ML: 0.9 INJECTION, SOLUTION INTRAVENOUS at 21:44

## 2023-02-16 RX ADMIN — MAGNESIUM SULFATE HEPTAHYDRATE 2 G: 40 INJECTION, SOLUTION INTRAVENOUS at 23:14

## 2023-02-16 NOTE — Clinical Note
Pamelasarahstaci Crowder was seen and treated in our emergency department on 2/16/2023  Diagnosis:     Syl Arriola  may return to work on return date  He may return on this date: 02/20/2023         If you have any questions or concerns, please don't hesitate to call        Min Dahl MD    ______________________________           _______________          _______________  Hospital Representative                              Date                                Time

## 2023-02-17 VITALS
HEART RATE: 77 BPM | TEMPERATURE: 98 F | SYSTOLIC BLOOD PRESSURE: 139 MMHG | RESPIRATION RATE: 20 BRPM | OXYGEN SATURATION: 95 % | HEIGHT: 70 IN | WEIGHT: 315 LBS | BODY MASS INDEX: 45.1 KG/M2 | DIASTOLIC BLOOD PRESSURE: 63 MMHG

## 2023-02-17 LAB
2HR DELTA HS TROPONIN: -3 NG/L
ATRIAL RATE: 107 BPM
ATRIAL RATE: 107 BPM
CARDIAC TROPONIN I PNL SERPL HS: 9 NG/L
P AXIS: 52 DEGREES
P AXIS: 52 DEGREES
PR INTERVAL: 178 MS
PR INTERVAL: 180 MS
QRS AXIS: -8 DEGREES
QRS AXIS: -9 DEGREES
QRSD INTERVAL: 78 MS
QRSD INTERVAL: 78 MS
QT INTERVAL: 320 MS
QT INTERVAL: 324 MS
QTC INTERVAL: 427 MS
QTC INTERVAL: 432 MS
T WAVE AXIS: 36 DEGREES
T WAVE AXIS: 39 DEGREES
VENTRICULAR RATE: 107 BPM
VENTRICULAR RATE: 107 BPM

## 2023-02-20 NOTE — ED PROVIDER NOTES
History  Chief Complaint   Patient presents with   • Rapid Heart Rate     Pt received with increased heart rate  40-year-old male presenting to the emergency department for evaluation of rapid heart rate  Patient had rapid heart rate, as high as 200 bpm   Patient has history of SVT which he will go into intermittently but is normally able to self regulate and break, he tried several vagal maneuvers to break the SVT and this night it did not work and so he presented to the hospital as a patient  He was met by my colleague, Dr Krissy Jacobo, at the door who gave him 12 mg of adenosine, this broke the SVT, I became involved in his case shortly thereafter, on my arrival in the room, the patient is alert and oriented, resting in the stretcher his symptoms have improved greatly, he is currently in normal sinus rhythm on the monitor  Prior to Admission Medications   Prescriptions Last Dose Informant Patient Reported? Taking?    Advair Diskus 250-50 MCG/DOSE inhaler   Yes No   Sig: Inhale 1 puff daily   Cartia  MG 24 hr capsule   Yes No   albuterol (PROVENTIL HFA,VENTOLIN HFA) 90 mcg/act inhaler   Yes No   Sig: Inhale 2 puffs every 6 (six) hours as needed for wheezing   diltiazem (CARDIZEM) 120 MG tablet   Yes No   Sig: Take 120 mg by mouth daily   Patient not taking: Reported on 11/15/2022   montelukast (SINGULAIR) 10 mg tablet   Yes No   Sig: Take 10 mg by mouth daily at bedtime   omeprazole (PriLOSEC) 20 mg delayed release capsule   Yes No   Sig: Take 20 mg by mouth daily      Facility-Administered Medications: None       Past Medical History:   Diagnosis Date   • Asthma    • CPAP (continuous positive airway pressure) dependence    • Pericardial effusion    • Sleep apnea    • SVT (supraventricular tachycardia) (HCC)        Past Surgical History:   Procedure Laterality Date   • CARDIAC SURGERY         Family History   Problem Relation Age of Onset   • Diabetes Mother    • Heart failure Mother    • Hypertension Father      I have reviewed and agree with the history as documented  E-Cigarette/Vaping   • E-Cigarette Use Never User      E-Cigarette/Vaping Substances   • Nicotine No    • THC No    • CBD No    • Flavoring No    • Other No    • Unknown No      Social History     Tobacco Use   • Smoking status: Never   • Smokeless tobacco: Never   Vaping Use   • Vaping Use: Never used   Substance Use Topics   • Alcohol use: Yes     Comment: rarely   • Drug use: Never       Review of Systems   Constitutional: Negative for appetite change, chills, fatigue and fever  HENT: Negative for sneezing and sore throat  Eyes: Negative for visual disturbance  Respiratory: Negative for cough, choking, chest tightness, shortness of breath and wheezing  Cardiovascular: Positive for chest pain and palpitations  Gastrointestinal: Negative for abdominal pain, constipation, diarrhea, nausea and vomiting  Genitourinary: Negative for difficulty urinating and dysuria  Neurological: Negative for dizziness, weakness, light-headedness, numbness and headaches  All other systems reviewed and are negative  Physical Exam  Physical Exam  Vitals and nursing note reviewed  Constitutional:       General: He is not in acute distress  Appearance: He is well-developed  He is not diaphoretic  HENT:      Head: Normocephalic and atraumatic  Eyes:      Pupils: Pupils are equal, round, and reactive to light  Neck:      Vascular: No JVD  Trachea: No tracheal deviation  Cardiovascular:      Rate and Rhythm: Normal rate and regular rhythm  Heart sounds: Normal heart sounds  No murmur heard  No friction rub  No gallop  Pulmonary:      Effort: Pulmonary effort is normal  No respiratory distress  Breath sounds: Normal breath sounds  No wheezing or rales  Abdominal:      General: Bowel sounds are normal  There is no distension  Palpations: Abdomen is soft  Tenderness:  There is no abdominal tenderness  There is no guarding or rebound  Skin:     General: Skin is warm and dry  Coloration: Skin is not pale  Neurological:      Mental Status: He is alert and oriented to person, place, and time  Cranial Nerves: No cranial nerve deficit  Motor: No abnormal muscle tone     Psychiatric:         Behavior: Behavior normal          Vital Signs  ED Triage Vitals   Temperature Pulse Respirations Blood Pressure SpO2   02/17/23 0100 02/16/23 2201 02/16/23 2201 02/16/23 2201 02/16/23 2201   98 °F (36 7 °C) 104 18 140/71 96 %      Temp Source Heart Rate Source Patient Position - Orthostatic VS BP Location FiO2 (%)   02/17/23 0100 02/16/23 2201 02/16/23 2201 02/16/23 2201 --   Oral Monitor Lying Right arm       Pain Score       02/16/23 2145       No Pain           Vitals:    02/16/23 2201 02/16/23 2300 02/17/23 0000 02/17/23 0100   BP: 140/71 155/71 143/70 139/63   Pulse: 104 93 84 77   Patient Position - Orthostatic VS: Lying Lying Lying Lying         Visual Acuity  Visual Acuity    Flowsheet Row Most Recent Value   L Pupil Size (mm) 3   R Pupil Size (mm) 3          ED Medications  Medications   adenosine (ADENOCARD) injection 12 mg (12 mg Intravenous Given 2/16/23 2145)   sodium chloride 0 9 % bolus 1,000 mL (0 mL Intravenous Stopped 2/17/23 0124)   magnesium sulfate 2 g/50 mL IVPB (premix) 2 g (0 g Intravenous Stopped 2/17/23 0021)       Diagnostic Studies  Results Reviewed     Procedure Component Value Units Date/Time    HS Troponin I 2hr [[id number]]  (Normal) Collected: 02/17/23 0022    Lab Status: Final result Specimen: Blood from Arm, Right Updated: 02/17/23 0105     hs TnI 2hr 9 ng/L      Delta 2hr hsTnI -3 ng/L     HS Troponin 0hr (reflex protocol) [[id number]]  (Normal) Collected: 02/16/23 2209    Lab Status: Final result Specimen: Blood from Arm, Right Updated: 02/16/23 2245     hs TnI 0hr 12 ng/L     Comprehensive metabolic panel [[id number]] Collected: 02/16/23 2209    Lab Status: Final result Specimen: Blood from Arm, Right Updated: 02/16/23 2242     Sodium 139 mmol/L      Potassium 4 0 mmol/L      Chloride 104 mmol/L      CO2 27 mmol/L      ANION GAP 8 mmol/L      BUN 14 mg/dL      Creatinine 0 99 mg/dL      Glucose 91 mg/dL      Calcium 9 4 mg/dL      AST 23 U/L      ALT 26 U/L      Alkaline Phosphatase 100 U/L      Total Protein 8 0 g/dL      Albumin 4 0 g/dL      Total Bilirubin 0 57 mg/dL      eGFR 96 ml/min/1 73sq m     Narrative:      National Kidney Disease Foundation guidelines for Chronic Kidney Disease (CKD):   •  Stage 1 with normal or high GFR (GFR > 90 mL/min/1 73 square meters)  •  Stage 2 Mild CKD (GFR = 60-89 mL/min/1 73 square meters)  •  Stage 3A Moderate CKD (GFR = 45-59 mL/min/1 73 square meters)  •  Stage 3B Moderate CKD (GFR = 30-44 mL/min/1 73 square meters)  •  Stage 4 Severe CKD (GFR = 15-29 mL/min/1 73 square meters)  •  Stage 5 End Stage CKD (GFR <15 mL/min/1 73 square meters)  Note: GFR calculation is accurate only with a steady state creatinine    Magnesium [[id number]]  (Abnormal) Collected: 02/16/23 2209    Lab Status: Final result Specimen: Blood from Arm, Right Updated: 02/16/23 2242     Magnesium 1 8 mg/dL     Phosphorus [[id number]]  (Normal) Collected: 02/16/23 2209    Lab Status: Final result Specimen: Blood from Arm, Right Updated: 02/16/23 2242     Phosphorus 3 4 mg/dL     CBC and differential [[id number]]  (Abnormal) Collected: 02/16/23 2209    Lab Status: Final result Specimen: Blood from Arm, Right Updated: 02/16/23 2216     WBC 11 47 Thousand/uL      RBC 5 36 Million/uL      Hemoglobin 14 8 g/dL      Hematocrit 45 5 %      MCV 85 fL      MCH 27 6 pg      MCHC 32 5 g/dL      RDW 14 4 %      MPV 10 1 fL      Platelets 539 Thousands/uL      nRBC 0 /100 WBCs      Neutrophils Relative 71 %      Immat GRANS % 1 %      Lymphocytes Relative 17 %      Monocytes Relative 9 %      Eosinophils Relative 2 %      Basophils Relative 0 %      Neutrophils Absolute 8 09 Thousands/µL      Immature Grans Absolute 0 06 Thousand/uL      Lymphocytes Absolute 1 91 Thousands/µL      Monocytes Absolute 1 08 Thousand/µL      Eosinophils Absolute 0 28 Thousand/µL      Basophils Absolute 0 05 Thousands/µL                  No orders to display              Procedures  Procedures         ED Course                                             Medical Decision Making  40-year-old male with SVT which has improved now after administration of adenosine, he is currently in normal sinus rhythm, his symptoms have subsided, I will continue to observe him, work-up for potentially threatening causes of the SVT when checking labs EKG troponin electrolytes, and reassess  Low, repleted here, initial troponin slightly elevated which is likely rate related, delta troponin was lower, patient continues to have no chest pain, repeat EKG shows no ischemic changes, recommend patient follow-up with his cardiologist/return for worsening symptoms, he expresses understanding  SVT (supraventricular tachycardia) ([facility] [address] ): self-limited or minor problem  Amount and/or Complexity of Data Reviewed  Labs: ordered  Risk  Prescription drug management  Disposition  Final diagnoses:   SVT (supraventricular tachycardia) ([facility] [address] )     Time reflects when diagnosis was documented in both MDM as applicable and the Disposition within this note     Time User Action Codes Description Comment    2/17/2023  1:12 AM Freddy Connors Add [I47 1] SVT (supraventricular tachycardia) [facility])       ED Disposition     ED Disposition   Discharge    Condition   Stable    Date/Time   Fri Feb 17, 2023  1:11 AM    Comment   Yelena Lemos discharge to home/self care                 Follow-up Information     Follow up With Specialties Details Why Contact Info    Stephan Joe MD [facility]   [address] [facility]  [phone]            Discharge Medication List as of 2/17/2023  1:12 AM      CONTINUE these medications which have NOT CHANGED    Details   Advair Diskus 250-50 MCG/DOSE inhaler Inhale 1 puff daily, Starting Thu 8/5/2021, Historical Med      albuterol (PROVENTIL HFA,VENTOLIN HFA) 90 mcg/act inhaler Inhale 2 puffs every 6 (six) hours as needed for wheezing, Historical Med      Cartia  MG 24 hr capsule Starting u 5/19/2022, Historical Med      diltiazem (CARDIZEM) 120 MG tablet Take 120 mg by mouth daily, Historical Med      montelukast (SINGULAIR) 10 mg tablet Take 10 mg by mouth daily at bedtime, Historical Med      omeprazole (PriLOSEC) 20 mg delayed release capsule Take 20 mg by mouth daily, Historical Med             No discharge procedures on file      PDMP Review     None          ED Provider  Electronically Signed by           Larry Hendricks MD  02/19/23 8917

## 2023-07-29 ENCOUNTER — OFFICE VISIT (OUTPATIENT)
Dept: URGENT CARE | Facility: CLINIC | Age: 38
End: 2023-07-29
Payer: COMMERCIAL

## 2023-07-29 VITALS
DIASTOLIC BLOOD PRESSURE: 98 MMHG | OXYGEN SATURATION: 94 % | TEMPERATURE: 98.9 F | HEART RATE: 76 BPM | BODY MASS INDEX: 63.99 KG/M2 | WEIGHT: 315 LBS | SYSTOLIC BLOOD PRESSURE: 175 MMHG

## 2023-07-29 DIAGNOSIS — L03.119 CELLULITIS OF LOWER EXTREMITY, UNSPECIFIED LATERALITY: Primary | ICD-10-CM

## 2023-07-29 PROCEDURE — 99213 OFFICE O/P EST LOW 20 MIN: CPT

## 2023-07-29 RX ORDER — CEPHALEXIN 500 MG/1
500 CAPSULE ORAL EVERY 6 HOURS SCHEDULED
Qty: 28 CAPSULE | Refills: 0 | Status: SHIPPED | OUTPATIENT
Start: 2023-07-29 | End: 2023-08-05

## 2023-07-29 NOTE — LETTER
July 29, 2023     Patient: Coleman Mei   YOB: 1985   Date of Visit: 7/29/2023       To Whom it May Concern:    Coleman Mei was seen in my clinic on 7/29/2023. He should be excused 7/29/23. If you have any questions or concerns, please don't hesitate to call.          Sincerely,          PATEL Correia        CC: No Recipients

## 2023-07-29 NOTE — PATIENT INSTRUCTIONS
Take all of the antibiotic  Follow up if pain gets worse to the legs, fever return, shortness of breath or worse calf pain

## 2023-07-29 NOTE — PROGRESS NOTES
[facility]    NAME: Jacquelyn Odonnell is a 45 y.o. male  : 1985    MRN: [id number]  DATE: 2023  TIME: 3:47 PM    Assessment and Plan   Cellulitis of lower extremity, unspecified laterality [I12.106]  1. Cellulitis of lower extremity, unspecified laterality  cephalexin (KEFLEX) 500 mg capsule        Symptoms consistent with cellulitis. Will start on antibiotics. Educated on return precautions to ER/urgent care. Edcuated that with redness, warmth and swelling bilaterally infection the likely diagnosis but that DVT cannot be ruled out and that if symptoms do not improve, worse posterior calf pain, shortness of breath, or worsening fever to go straight to the ER. Follow up with PCP in 3-5 days. Patient Instructions     Take all pills of the antibiotics even if feeling better, you should have no pills remaining. Return or go to ER if redness area is spreading, pain is worse, fevers/chills, dizziness and body aches. Follow up with PCP in 3-5 days. Proceed to ER if symptoms worsen. Chief Complaint     Chief Complaint   Patient presents with   • Leg Pain     Pt c/o both legs swollen, shooting pain in the back of the calf up into the knee for the last week. Started in the left leg and is now going into the right leg. Also states that he has had a fever since yesterday. History of Present Illness       Presents with pain, swelling, and fever that started yesterday. Pain is to bilateral lower legs worse to the left side. Pain to the posterior leg/calf up to the knee and back anteriorly over the shin. Previous cellulitis requiring hospitalization in . Fever to 102 yesterday with chills. Denies other sick symptoms.        Review of Systems   Review of Systems      Current Medications       Current Outpatient Medications:   •  cephalexin (KEFLEX) 500 mg capsule, Take 1 capsule (500 mg total) by mouth every 6 (six) hours for 7 days, Disp: 28 capsule, Rfl: 0  •  Advair Diskus 250-50 MCG/DOSE inhaler, Inhale 1 puff daily, Disp: , Rfl:   •  albuterol (PROVENTIL HFA,VENTOLIN HFA) 90 mcg/act inhaler, Inhale 2 puffs every 6 (six) hours as needed for wheezing, Disp: , Rfl:   •  Cartia  MG 24 hr capsule, , Disp: , Rfl:   •  diltiazem (CARDIZEM) 120 MG tablet, Take 120 mg by mouth daily (Patient not taking: Reported on 11/15/2022), Disp: , Rfl:   •  montelukast (SINGULAIR) 10 mg tablet, Take 10 mg by mouth daily at bedtime, Disp: , Rfl:   •  omeprazole (PriLOSEC) 20 mg delayed release capsule, Take 20 mg by mouth daily, Disp: , Rfl:     Current Allergies     Allergies as of 07/29/2023   • (No Known Allergies)            The following portions of the patient's history were reviewed and updated as appropriate: allergies, current medications, past family history, past medical history, past social history, past surgical history and problem list.     Past Medical History:   Diagnosis Date   • Asthma    • CPAP (continuous positive airway pressure) dependence    • Pericardial effusion    • Sleep apnea    • SVT (supraventricular tachycardia) ([facility])        Past Surgical History:   Procedure Laterality Date   • CARDIAC SURGERY         Family History   Problem Relation Age of Onset   • Diabetes Mother    • Heart failure Mother    • Hypertension Father          Medications have been verified. Objective   BP (!) 175/98   Pulse 76   Temp 98.9 °F (37.2 °C)   Wt (!) 202 kg (446 lb)   SpO2 94%   BMI 63.99 kg/m²        Physical Exam     Physical Exam  Vitals reviewed. Constitutional:       Appearance: Normal appearance. Cardiovascular:      Rate and Rhythm: Normal rate and regular rhythm. Pulses: Normal pulses. Heart sounds: Normal heart sounds. No murmur heard. Pulmonary:      Effort: Pulmonary effort is normal. No respiratory distress. Breath sounds: Normal breath sounds. Skin:     General: Skin is warm and dry. Capillary Refill: Capillary refill takes less than 2 seconds. Comments: Bilateral legs obese with swelling generalized with dry, scaly redness to bilateral lower shins, with very small open area to the left shin with erythema, warmth noted. No drainage, no large open area. Neurological:      General: No focal deficit present. Mental Status: He is alert and oriented to person, place, and time.    Psychiatric:         Mood and Affect: Mood normal.         Behavior: Behavior normal.

## 2023-11-02 ENCOUNTER — APPOINTMENT (EMERGENCY)
Dept: VASCULAR ULTRASOUND | Facility: HOSPITAL | Age: 38
End: 2023-11-02
Payer: COMMERCIAL

## 2023-11-02 ENCOUNTER — HOSPITAL ENCOUNTER (OUTPATIENT)
Facility: HOSPITAL | Age: 38
Setting detail: OBSERVATION
Discharge: HOME | End: 2023-11-05
Attending: EMERGENCY MEDICINE | Admitting: FAMILY MEDICINE
Payer: COMMERCIAL

## 2023-11-02 DIAGNOSIS — L03.90 CELLULITIS: Primary | ICD-10-CM

## 2023-11-02 LAB
ALBUMIN SERPL BCP-MCNC: 4.1 G/DL (ref 3.5–5)
ALP SERPL-CCNC: 94 U/L (ref 34–104)
ALT SERPL W P-5'-P-CCNC: 17 U/L (ref 7–52)
ANION GAP SERPL CALCULATED.3IONS-SCNC: 9 MMOL/L
APTT PPP: 35 SECONDS (ref 23–37)
AST SERPL W P-5'-P-CCNC: 13 U/L (ref 13–39)
BASOPHILS # BLD AUTO: 0.05 THOUSANDS/ÂΜL (ref 0–0.1)
BASOPHILS NFR BLD AUTO: 0 % (ref 0–1)
BILIRUB SERPL-MCNC: 0.8 MG/DL (ref 0.2–1)
BUN SERPL-MCNC: 12 MG/DL (ref 5–25)
CALCIUM SERPL-MCNC: 9.2 MG/DL (ref 8.4–10.2)
CHLORIDE SERPL-SCNC: 103 MMOL/L (ref 96–108)
CO2 SERPL-SCNC: 26 MMOL/L (ref 21–32)
CREAT SERPL-MCNC: 0.93 MG/DL (ref 0.6–1.3)
EOSINOPHIL # BLD AUTO: 0.33 THOUSAND/ÂΜL (ref 0–0.61)
EOSINOPHIL NFR BLD AUTO: 2 % (ref 0–6)
ERYTHROCYTE [DISTWIDTH] IN BLOOD BY AUTOMATED COUNT: 14.4 % (ref 11.6–15.1)
GFR SERPL CREATININE-BSD FRML MDRD: 103 ML/MIN/1.73SQ M
GLUCOSE SERPL-MCNC: 111 MG/DL (ref 65–140)
HCT VFR BLD AUTO: 42.9 % (ref 36.5–49.3)
HGB BLD-MCNC: 13.9 G/DL (ref 12–17)
IMM GRANULOCYTES # BLD AUTO: 0.1 THOUSAND/UL (ref 0–0.2)
IMM GRANULOCYTES NFR BLD AUTO: 1 % (ref 0–2)
INR PPP: 1.08 (ref 0.84–1.19)
LACTATE SERPL-SCNC: 1.3 MMOL/L (ref 0.5–2)
LYMPHOCYTES # BLD AUTO: 2.7 THOUSANDS/ÂΜL (ref 0.6–4.47)
LYMPHOCYTES NFR BLD AUTO: 16 % (ref 14–44)
MCH RBC QN AUTO: 27.3 PG (ref 26.8–34.3)
MCHC RBC AUTO-ENTMCNC: 32.4 G/DL (ref 31.4–37.4)
MCV RBC AUTO: 84 FL (ref 82–98)
MONOCYTES # BLD AUTO: 1.22 THOUSAND/ÂΜL (ref 0.17–1.22)
MONOCYTES NFR BLD AUTO: 7 % (ref 4–12)
NEUTROPHILS # BLD AUTO: 12.53 THOUSANDS/ÂΜL (ref 1.85–7.62)
NEUTS SEG NFR BLD AUTO: 74 % (ref 43–75)
NRBC BLD AUTO-RTO: 0 /100 WBCS
PLATELET # BLD AUTO: 271 THOUSANDS/UL (ref 149–390)
PMV BLD AUTO: 10 FL (ref 8.9–12.7)
POTASSIUM SERPL-SCNC: 3.6 MMOL/L (ref 3.5–5.3)
PROCALCITONIN SERPL-MCNC: 0.09 NG/ML
PROT SERPL-MCNC: 7.9 G/DL (ref 6.4–8.4)
PROTHROMBIN TIME: 14.6 SECONDS (ref 11.6–14.5)
RBC # BLD AUTO: 5.1 MILLION/UL (ref 3.88–5.62)
SODIUM SERPL-SCNC: 138 MMOL/L (ref 135–147)
WBC # BLD AUTO: 16.93 THOUSAND/UL (ref 4.31–10.16)

## 2023-11-02 PROCEDURE — 85025 COMPLETE CBC W/AUTO DIFF WBC: CPT | Performed by: EMERGENCY MEDICINE

## 2023-11-02 PROCEDURE — 99284 EMERGENCY DEPT VISIT MOD MDM: CPT

## 2023-11-02 PROCEDURE — 99285 EMERGENCY DEPT VISIT HI MDM: CPT

## 2023-11-02 PROCEDURE — 93971 EXTREMITY STUDY: CPT

## 2023-11-02 PROCEDURE — 87040 BLOOD CULTURE FOR BACTERIA: CPT | Performed by: EMERGENCY MEDICINE

## 2023-11-02 PROCEDURE — 85730 THROMBOPLASTIN TIME PARTIAL: CPT | Performed by: EMERGENCY MEDICINE

## 2023-11-02 PROCEDURE — 80053 COMPREHEN METABOLIC PANEL: CPT | Performed by: EMERGENCY MEDICINE

## 2023-11-02 PROCEDURE — 85610 PROTHROMBIN TIME: CPT | Performed by: EMERGENCY MEDICINE

## 2023-11-02 PROCEDURE — 36415 COLL VENOUS BLD VENIPUNCTURE: CPT

## 2023-11-02 PROCEDURE — 84145 PROCALCITONIN (PCT): CPT | Performed by: EMERGENCY MEDICINE

## 2023-11-02 PROCEDURE — 83605 ASSAY OF LACTIC ACID: CPT | Performed by: EMERGENCY MEDICINE

## 2023-11-02 PROCEDURE — 96365 THER/PROPH/DIAG IV INF INIT: CPT

## 2023-11-02 RX ORDER — CEFTRIAXONE 2 G/50ML
2000 INJECTION, SOLUTION INTRAVENOUS ONCE
Status: COMPLETED | OUTPATIENT
Start: 2023-11-02 | End: 2023-11-02

## 2023-11-02 RX ADMIN — CEFTRIAXONE 2000 MG: 2 INJECTION, SOLUTION INTRAVENOUS at 23:23

## 2023-11-02 NOTE — LETTER
[address] [facility] 3RD FLOOR MED SURG UNIT  [address]  [address] [place] [id number]  Dept: [phone]    November 5, 2023     Patient: Therese Acosta   YOB: 1985   Date of Visit: 11/2/2023       To Whom it May Concern:    Therese Acosta is under my professional care. He was seen in the hospital from 11/2/2023 to 11/05/23. He may return to work on 11/9/23 without limitations. If you have any questions or concerns, please don't hesitate to call.          Sincerely,          Billey Lombard, MD

## 2023-11-03 ENCOUNTER — APPOINTMENT (OUTPATIENT)
Dept: CT IMAGING | Facility: HOSPITAL | Age: 38
End: 2023-11-03
Payer: COMMERCIAL

## 2023-11-03 PROCEDURE — 93971 EXTREMITY STUDY: CPT | Performed by: INTERNAL MEDICINE

## 2023-11-03 PROCEDURE — 73701 CT LOWER EXTREMITY W/DYE: CPT

## 2023-11-03 PROCEDURE — G1004 CDSM NDSC: HCPCS

## 2023-11-03 PROCEDURE — 99204 OFFICE O/P NEW MOD 45 MIN: CPT | Performed by: INTERNAL MEDICINE

## 2023-11-03 PROCEDURE — 99222 1ST HOSP IP/OBS MODERATE 55: CPT | Performed by: STUDENT IN AN ORGANIZED HEALTH CARE EDUCATION/TRAINING PROGRAM

## 2023-11-03 RX ORDER — CEFAZOLIN SODIUM 2 G/50ML
2000 SOLUTION INTRAVENOUS EVERY 6 HOURS
Status: DISCONTINUED | OUTPATIENT
Start: 2023-11-03 | End: 2023-11-05 | Stop reason: HOSPADM

## 2023-11-03 RX ORDER — ACETAMINOPHEN 325 MG/1
650 TABLET ORAL EVERY 6 HOURS PRN
Status: DISCONTINUED | OUTPATIENT
Start: 2023-11-03 | End: 2023-11-05 | Stop reason: HOSPADM

## 2023-11-03 RX ORDER — ENOXAPARIN SODIUM 100 MG/ML
40 INJECTION SUBCUTANEOUS DAILY
Status: DISCONTINUED | OUTPATIENT
Start: 2023-11-03 | End: 2023-11-03

## 2023-11-03 RX ORDER — CEFTRIAXONE 2 G/50ML
2000 INJECTION, SOLUTION INTRAVENOUS ONCE
Status: DISCONTINUED | OUTPATIENT
Start: 2023-11-03 | End: 2023-11-03

## 2023-11-03 RX ORDER — ENOXAPARIN SODIUM 100 MG/ML
60 INJECTION SUBCUTANEOUS EVERY 12 HOURS SCHEDULED
Status: DISCONTINUED | OUTPATIENT
Start: 2023-11-03 | End: 2023-11-05 | Stop reason: HOSPADM

## 2023-11-03 RX ORDER — ALBUTEROL SULFATE 90 UG/1
2 AEROSOL, METERED RESPIRATORY (INHALATION) EVERY 6 HOURS PRN
Status: DISCONTINUED | OUTPATIENT
Start: 2023-11-03 | End: 2023-11-05 | Stop reason: HOSPADM

## 2023-11-03 RX ORDER — FLUTICASONE FUROATE AND VILANTEROL 100; 25 UG/1; UG/1
1 POWDER RESPIRATORY (INHALATION) DAILY
Status: DISCONTINUED | OUTPATIENT
Start: 2023-11-03 | End: 2023-11-05 | Stop reason: HOSPADM

## 2023-11-03 RX ORDER — MONTELUKAST SODIUM 10 MG/1
10 TABLET ORAL
Status: DISCONTINUED | OUTPATIENT
Start: 2023-11-03 | End: 2023-11-05 | Stop reason: HOSPADM

## 2023-11-03 RX ORDER — PANTOPRAZOLE SODIUM 20 MG/1
20 TABLET, DELAYED RELEASE ORAL
Status: DISCONTINUED | OUTPATIENT
Start: 2023-11-03 | End: 2023-11-05 | Stop reason: HOSPADM

## 2023-11-03 RX ORDER — DILTIAZEM HYDROCHLORIDE 120 MG/1
120 CAPSULE, COATED, EXTENDED RELEASE ORAL DAILY
Status: DISCONTINUED | OUTPATIENT
Start: 2023-11-04 | End: 2023-11-05 | Stop reason: HOSPADM

## 2023-11-03 RX ADMIN — ENOXAPARIN SODIUM 60 MG: 60 INJECTION SUBCUTANEOUS at 21:54

## 2023-11-03 RX ADMIN — CEFAZOLIN SODIUM 2000 MG: 2 SOLUTION INTRAVENOUS at 18:16

## 2023-11-03 RX ADMIN — IOHEXOL 100 ML: 350 INJECTION, SOLUTION INTRAVENOUS at 09:02

## 2023-11-03 RX ADMIN — MONTELUKAST 10 MG: 10 TABLET, FILM COATED ORAL at 21:54

## 2023-11-03 RX ADMIN — PANTOPRAZOLE SODIUM 20 MG: 20 TABLET, DELAYED RELEASE ORAL at 09:58

## 2023-11-03 RX ADMIN — ENOXAPARIN SODIUM 60 MG: 60 INJECTION SUBCUTANEOUS at 09:58

## 2023-11-03 RX ADMIN — FLUTICASONE FUROATE AND VILANTEROL TRIFENATATE 1 PUFF: 100; 25 POWDER RESPIRATORY (INHALATION) at 10:13

## 2023-11-03 RX ADMIN — CEFAZOLIN SODIUM 2000 MG: 2 SOLUTION INTRAVENOUS at 13:04

## 2023-11-03 NOTE — ED PROVIDER NOTES
History  Chief Complaint   Patient presents with    Leg Swelling     Left leg swelling and redness, c/o pain all the way up the leg to the groin      80-year-old male presents to the ER for evaluation of leg pain. PMH: Asthma,  sleep apnea, SVT patient stated that yesterday around 5 PM he was putting on his boots and socks and had pain to light touch on his left lower extremity specifically where his ankle and foot join anteriorly. Patient stated that he does have history of bilateral lymphedema and does intermittently get leg edema worse than baseline. Patient stated that he has had history of cellulitis in the past however the pain has never been this severe nor has radiated anywhere else. There is a noted area of erythema, warmth and pain with light palpation of the left lower extremity that radiates into his groin. There is lymphatic spread. Patient denies any recent long travel, shortness of breath or history of DVT or PE. Patient is not on blood thinners. No medication given prior to arrival.  Patient able to ambulate. No fever, chills, nausea, vomiting. History provided by:  Patient      Prior to Admission Medications   Prescriptions Last Dose Informant Patient Reported? Taking?    Advair Diskus 250-50 MCG/DOSE inhaler  Self Yes No   Sig: Inhale 1 puff daily   Cartia  MG 24 hr capsule  Self Yes No   albuterol (PROVENTIL HFA,VENTOLIN HFA) 90 mcg/act inhaler  Self Yes No   Sig: Inhale 2 puffs every 6 (six) hours as needed for wheezing   diltiazem (CARDIZEM) 120 MG tablet  Self Yes No   Sig: Take 120 mg by mouth daily   Patient not taking: Reported on 11/15/2022   montelukast (SINGULAIR) 10 mg tablet  Self Yes No   Sig: Take 10 mg by mouth daily at bedtime   omeprazole (PriLOSEC) 20 mg delayed release capsule  Self Yes No   Sig: Take 20 mg by mouth daily      Facility-Administered Medications: None       Past Medical History:   Diagnosis Date    Asthma     CPAP (continuous positive airway pressure) dependence     Pericardial effusion     Sleep apnea     SVT (supraventricular tachycardia)        Past Surgical History:   Procedure Laterality Date    CARDIAC SURGERY         Family History   Problem Relation Age of Onset    Diabetes Mother     Heart failure Mother     Hypertension Father      I have reviewed and agree with the history as documented. E-Cigarette/Vaping    E-Cigarette Use Never User      E-Cigarette/Vaping Substances    Nicotine No     THC No     CBD No     Flavoring No     Other No     Unknown No      Social History     Tobacco Use    Smoking status: Never    Smokeless tobacco: Never   Vaping Use    Vaping Use: Never used   Substance Use Topics    Alcohol use: Yes     Comment: rarely    Drug use: Never       Review of Systems   Constitutional:  Negative for chills and fever. Respiratory:  Negative for shortness of breath. Cardiovascular:  Negative for chest pain and palpitations. Gastrointestinal:  Negative for abdominal pain, diarrhea, nausea and vomiting. Musculoskeletal:  Negative for arthralgias and back pain. Skin:  Positive for rash (Left lower extremity, that radiates into groin). Negative for color change. Neurological:  Negative for dizziness, syncope, weakness and headaches. All other systems reviewed and are negative. Physical Exam  Physical Exam  Vitals and nursing note reviewed. Constitutional:       General: He is not in acute distress. Appearance: He is well-developed. HENT:      Head: Normocephalic and atraumatic. Right Ear: External ear normal.      Left Ear: External ear normal.   Eyes:      Conjunctiva/sclera: Conjunctivae normal.   Cardiovascular:      Rate and Rhythm: Normal rate and regular rhythm. Pulses: Normal pulses. Heart sounds: Normal heart sounds. Pulmonary:      Effort: Pulmonary effort is normal. No respiratory distress. Breath sounds: Normal breath sounds.    Abdominal:      General: Abdomen is protuberant. Tenderness: There is no abdominal tenderness. Musculoskeletal:         General: No swelling. Cervical back: Neck supple. Skin:     General: Skin is warm and dry. Capillary Refill: Capillary refill takes less than 2 seconds. Findings: Erythema and rash present. Neurological:      Mental Status: He is alert.    Psychiatric:         Mood and Affect: Mood normal.         Behavior: Behavior normal.         Vital Signs  ED Triage Vitals [11/02/23 2123]   Temperature Pulse Respirations Blood Pressure SpO2   98.6 °F (37 °C) 102 20 151/80 96 %      Temp Source Heart Rate Source Patient Position - Orthostatic VS BP Location FiO2 (%)   Oral Monitor Sitting Left arm --      Pain Score       --           Vitals:    11/03/23 0430 11/03/23 0613 11/03/23 0615 11/03/23 0730   BP: (!) 179/87 148/76 148/76 154/90   Pulse: 91 73 80 76   Patient Position - Orthostatic VS: Lying Lying Lying Lying         Visual Acuity      ED Medications  Medications   cefTRIAXone (ROCEPHIN) IVPB (premix in dextrose) 2,000 mg 50 mL (0 mg Intravenous Stopped 11/2/23 2353)       Diagnostic Studies  Results Reviewed       Procedure Component Value Units Date/Time    Comprehensive metabolic panel [[id number]] Collected: 11/02/23 2133    Lab Status: Final result Specimen: Blood from Arm, Right Updated: 11/02/23 2340     Sodium 138 mmol/L      Potassium 3.6 mmol/L      Chloride 103 mmol/L      CO2 26 mmol/L      ANION GAP 9 mmol/L      BUN 12 mg/dL      Creatinine 0.93 mg/dL      Glucose 111 mg/dL      Calcium 9.2 mg/dL      AST 13 U/L      ALT 17 U/L      Alkaline Phosphatase 94 U/L      Total Protein 7.9 g/dL      Albumin 4.1 g/dL      Total Bilirubin 0.80 mg/dL      eGFR 103 ml/min/1.73sq m     Narrative:      [place] guidelines for Chronic Kidney Disease (CKD):     Stage 1 with normal or high GFR (GFR > 90 mL/min/1.73 square meters)    Stage 2 Mild CKD (GFR = 60-89 mL/min/1.73 square meters)    Stage 3A Moderate CKD (GFR = 45-59 mL/min/1.73 square meters)    Stage 3B Moderate CKD (GFR = 30-44 mL/min/1.73 square meters)    Stage 4 Severe CKD (GFR = 15-29 mL/min/1.73 square meters)    Stage 5 End Stage CKD (GFR <15 mL/min/1.73 square meters)  Note: GFR calculation is accurate only with a steady state creatinine    Lactic acid, plasma (w/reflex if result > 2.0) [[id number]]  (Normal) Collected: 11/02/23 2254    Lab Status: Final result Specimen: Blood from Arm, Left Updated: 11/02/23 2318     LACTIC ACID 1.3 mmol/L     Narrative:      Result may be elevated if tourniquet was used during collection. Blood culture #2 [[id number]] Collected: 11/02/23 2254    Lab Status:  In process Specimen: Blood from Arm, Left Updated: 11/02/23 2300    APTT [[id number]]  (Normal) Collected: 11/02/23 2133    Lab Status: Final result Specimen: Blood from Arm, Right Updated: 11/02/23 2221     PTT 35 seconds     Protime-INR [[id number]]  (Abnormal) Collected: 11/02/23 2133    Lab Status: Final result Specimen: Blood from Arm, Right Updated: 11/02/23 2221     Protime 14.6 seconds      INR 1.08    Procalcitonin [[id number]]  (Normal) Collected: 11/02/23 2133    Lab Status: Final result Specimen: Blood from Arm, Right Updated: 11/02/23 2210     Procalcitonin 0.09 ng/ml     CBC and differential [[id number]]  (Abnormal) Collected: 11/02/23 2133    Lab Status: Final result Specimen: Blood from Arm, Right Updated: 11/02/23 2144     WBC 16.93 Thousand/uL      RBC 5.10 Million/uL      Hemoglobin 13.9 g/dL      Hematocrit 42.9 %      MCV 84 fL      MCH 27.3 pg      MCHC 32.4 g/dL      RDW 14.4 %      MPV 10.0 fL      Platelets 589 Thousands/uL      nRBC 0 /100 WBCs      Neutrophils Relative 74 %      Immat GRANS % 1 %      Lymphocytes Relative 16 %      Monocytes Relative 7 %      Eosinophils Relative 2 %      Basophils Relative 0 %      Neutrophils Absolute 12.53 Thousands/µL      Immature Grans Absolute 0.10 Thousand/uL Lymphocytes Absolute 2.70 Thousands/µL      Monocytes Absolute 1.22 Thousand/µL      Eosinophils Absolute 0.33 Thousand/µL      Basophils Absolute 0.05 Thousands/µL     Blood culture #1 [[id number]] Collected: 11/02/23 2133    Lab Status: In process Specimen: Blood from Arm, Right Updated: 11/02/23 2140                   VAS lower limb venous duplex study, unilateral/limited    (Results Pending)              Procedures  Procedures         ED Course  ED Course as of 11/03/23 0748   Thu Nov 02, 2023 2320 Vascular duplex negative for DVT in left lower extremity. Fri Nov 03, 2023   0027 Spoke with [facility] for admission                               SBIRT 20yo+      Flowsheet Row Most Recent Value   Initial Alcohol Screen: US AUDIT-C     1. How often do you have a drink containing alcohol? 0 Filed at: 11/02/2023 2252   2. How many drinks containing alcohol do you have on a typical day you are drinking? 0 Filed at: 11/02/2023 2252   3a. Male UNDER 65: How often do you have five or more drinks on one occasion? 0 Filed at: 11/02/2023 2252   Audit-C Score 0 Filed at: 11/02/2023 2252   [place]: How many times in the past year have you. .. Used an illegal drug or used a prescription medication for non-medical reasons? Never Filed at: 11/02/2023 2252                      Medical Decision Making  This patient presents with initial presentation of local erythema, warmth, swelling concerning for cellulitis. Since sensitivity/pain to light touch around the erythematous area. Lymphatic spread visible however no full fluid pockets or fluctuance concerning for abscess noted. Low concern for DVT, vascular duplex negative. No immune compromise, bullae, pain out of proportion, or rapid progression concerning for necrotizing fasciitis. Patient given Rocephin. Patient had a CBC which was positive for leukocytosis. CMP negative for metabolic derangements. Lactic acid was negative for acidosis. Prolactin negative.   Blood cultures sent and pending. Patient's case discussed with [facility] internal medicine and patient's case was excepted for further evaluation. Patient agreed with plan of care for admission. Amount and/or Complexity of Data Reviewed  Labs: ordered. Risk  Prescription drug management. Decision regarding hospitalization. Disposition  Final diagnoses:   Cellulitis     Time reflects when diagnosis was documented in both MDM as applicable and the Disposition within this note       Time User Action Codes Description Comment    11/3/2023 12:51 AM Yu Conte Add [L03.90] Cellulitis           ED Disposition       ED Disposition   Admit    Condition   Stable    Date/Time   Fri Nov 3, 2023 0051    Comment   Case was discussed with Jaye Goodman and the patient's admission status was agreed to be Admission Status: observation status to the service of Dr. Jonah Lara . Follow-up Information    None         Patient's Medications   Discharge Prescriptions    No medications on file       No discharge procedures on file.     PDMP Review       None            ED Provider  Electronically Signed by             Marilynn Guzman  11/03/23 0959

## 2023-11-03 NOTE — ASSESSMENT & PLAN NOTE
L leg erythema (also present in RLE) is chronic for over two years -- past history of chronic venous stasis and lymphedema   He reported that on Wednesday prior to admission his symptoms started with pain in the foot and mid shin. Thursday pain started in the groin. WBC count 16.9  Given a dose of ctx 2gm in the ED   Reported chills at home but no documented fevers   Left lower extremity Doppler showed no evidence of acute or chronic DVT.   No evidence of superficial thrombophlebitis  Procal was found to be low   Continue ctx 2 gm daily for cellulitis coverage   Obtain CT LLE to rule out signs of deeper infection or abscess   Will obtain ID consult given mixed picture to evaluate for abx and management   Pain control with tylenol, can escalate as needed

## 2023-11-03 NOTE — CONSULTS
Consultation - Infectious Disease   Daisy Ch 45 y.o. male MRN: [id number]  Unit/Bed#: ED 06 Encounter: [id number]      IMPRESSION & RECOMMENDATIONS:     Left leg cellulitis with lymphangitic streaking  - In setting of chronic edema/venous stasis. Appearance is very consistent with Streptococcal infection. No clear abscess on exam. Cellulitis appears to overlay left ankle joint some, but no clear signs of joint involvement as ROM intact. He was previously treated by our group in 2021 for a streptococcal cellulitis and improved on Cefazolin/Cephalexin. No history of MRSA. -Change antibiotic to IV Cefazolin 2g every 6 hours (increased dose given significant BMI)  -Serial leg exams  -Follow up CT; if any abscess would ask surgery to I&D and send cultures of purulence  -If any joint effusion of ankle on CT may need to consider Ortho eval, though current concern for joint involvement low  -Follow up blood cultures  -Aggressive leg elevation for edema control  -Repeat CBC + diff in AM    Leukocytosis  - 16.9 on arrival. Suspect in setting of acute cellulitis as above. Also consider possibility of bacteremia. Otherwise no pulmonary, GI or  complaints and LFTs normal.  -Antibiotics as above  -Repeat CBC + diff in AM  -Follow up blood cultures    Bilateral lower extremity venosus stasis   - Chronic, significant risk factor for cellulitis. Morbid obesity (BMI 64)  - Risk factor for cellulitis. Asthma  -Does not appear to be in exacerbation    I have discussed the above management plan in detail with the primary service    I have performed an extensive review of the medical records in Epic including review of the notes, radiographs, and laboratory results     HISTORY OF PRESENT ILLNESS:  Reason for Consult: cellulitis    HPI: Daisy Ch is a 45y.o. year old male with chronic venous stasis of lower extremities, TALIA on CPAP who presented with acute left leg swelling/pain.  He notes he has chronic edema and is on his feet a lot as a paramedic. A few days ago first noted new acute pain in distal left lower leg near the ankle. He thought was related to his socks. Then he later developed pain in left groin and eventually these two pains connected to form continuous line of pain. He noted worsening erythema and swelling over left tibial area with red lines going up to groin as well. Denies any recent cuts, wounds, pimples or bug bites. No history of MRSA he is aware of. Denies fever, but did note some occasional chills. REVIEW OF SYSTEMS:  A complete review of systems is negative other than that noted in the HPI. PAST MEDICAL HISTORY:  Past Medical History:   Diagnosis Date    Asthma     CPAP (continuous positive airway pressure) dependence     Pericardial effusion     Sleep apnea     SVT (supraventricular tachycardia)      Past Surgical History:   Procedure Laterality Date    CARDIAC SURGERY         FAMILY HISTORY:  Non-contributory    SOCIAL HISTORY:  Social History   Social History     Substance and Sexual Activity   Alcohol Use Yes    Comment: rarely     Social History     Substance and Sexual Activity   Drug Use Never     Social History     Tobacco Use   Smoking Status Never   Smokeless Tobacco Never       ALLERGIES:  No Known Allergies    MEDICATIONS:  All current active medications have been reviewed.     PHYSICAL EXAM:  Temp:  [98.6 °F (37 °C)] 98.6 °F (37 °C)  HR:  [] 69  Resp:  [17-20] 18  BP: (134-215)/(65-99) 134/65  SpO2:  [90 %-96 %] 90 %  Temp (24hrs), Av.6 °F (37 °C), Min:98.6 °F (37 °C), Max:98.6 °F (37 °C)  Current: Temperature: 98.6 °F (37 °C)    Intake/Output Summary (Last 24 hours) at 11/3/2023 1104  Last data filed at 2023 2353  Gross per 24 hour   Intake 50 ml   Output --   Net 50 ml       General Appearance:  Appearing well, nontoxic, and in no distress   Head:  Normocephalic, without obvious abnormality, atraumatic   Eyes:  Conjunctiva pink and sclera anicteric, both eyes   Nose: Nares normal, mucosa normal, no drainage   Throat: Oropharynx moist without lesions   Neck: Supple, symmetrical, no adenopathy, no tenderness/mass/nodules   Back:   Symmetric   Lungs:   Clear to auscultation bilaterally, respirations unlabored   Chest Wall:  No tenderness or deformity   Heart:  RRR; no murmur, rub or gallop   Abdomen:   Soft, non-tender,    Extremities: Bilateral venous stasis changes of lower legs; left leg with edema and erythema over tibial area extending down to ankle, lymphangitic streaking noted up leg toward groin   Skin: No rashes. No draining wounds noted. Lymph nodes: Cervical, supraclavicular nodes normal   Neurologic: Alert and oriented        LABS, IMAGING, & OTHER STUDIES:  Lab Results:  I have personally reviewed pertinent labs. Results from last 7 days   Lab Units 11/02/23  2133   WBC Thousand/uL 16.93*   HEMOGLOBIN g/dL 13.9   PLATELETS Thousands/uL 271     Results from last 7 days   Lab Units 11/02/23  2133   SODIUM mmol/L 138   POTASSIUM mmol/L 3.6   CHLORIDE mmol/L 103   CO2 mmol/L 26   BUN mg/dL 12   CREATININE mg/dL 0.93   EGFR ml/min/1.73sq m 103   CALCIUM mg/dL 9.2   AST U/L 13   ALT U/L 17   ALK PHOS U/L 94     Results from last 7 days   Lab Units 11/02/23  2254 11/02/23  2133   BLOOD CULTURE  Received in Microbiology Lab. Culture in Progress. Received in Microbiology Lab. Culture in Progress. Results from last 7 days   Lab Units 11/02/23  2133   PROCALCITONIN ng/ml 0.09       Imaging Studies:   I have personally reviewed pertinent imaging study reports and images in PACS. Other Studies:   I have personally reviewed pertinent reports.       Mary Coulter MD  [facility]

## 2023-11-03 NOTE — H&P
[address]  H&P  Name: Timothy Knight 45 y.o. male I MRN: [id number]  Unit/Bed#: ED 06 I Date of Admission: 11/2/2023   Date of Service: 11/3/2023 I Hospital Day: 0      Assessment/Plan   * Cellulitis of left lower extremity  Assessment & Plan  L leg erythema (also present in RLE) is chronic for over two years -- past history of chronic venous stasis and lymphedema   He reported that on Wednesday prior to admission his symptoms started with pain in the foot and mid shin. Thursday pain started in the groin. WBC count 16.9  Given a dose of ctx 2gm in the ED   Reported chills at home but no documented fevers   Left lower extremity Doppler showed no evidence of acute or chronic DVT. No evidence of superficial thrombophlebitis  Procal was found to be low   Continue ctx 2 gm daily for cellulitis coverage   Obtain CT LLE to rule out signs of deeper infection or abscess   Will obtain ID consult given mixed picture to evaluate for abx and management   Pain control with tylenol, can escalate as needed           Hypertension  Assessment & Plan  BP noted to be 215/99 in the ED but came down on their own without intervention to 148/76 this AM   Takes Cardizem 120 mg daily at home for SVT   Elevation was likely related to pain   Continue to monitor     Asthma  Assessment & Plan  Takes Advair, montelukast and as needed albuterol at home   Continue home meds   No signs of exac    GERD (gastroesophageal reflux disease)  Assessment & Plan  Continue PPI          VTE Pharmacologic Prophylaxis: VTE Score: 5 Moderate Risk (Score 3-4) - Pharmacological DVT Prophylaxis Ordered: enoxaparin (Lovenox). Code Status: Level 1 - Full Code confirmed with patient   Discussion with family: Patient declined call to . Anticipated Length of Stay: Patient will be admitted on an inpatient basis with an anticipated length of stay of greater than 2 midnights secondary to cellulitis .     Total Time Spent on Date of Encounter in care of patient: 40 mins. This time was spent on one or more of the following: performing physical exam; counseling and coordination of care; obtaining or reviewing history; documenting in the medical record; reviewing/ordering tests, medications or procedures; communicating with other healthcare professionals and discussing with patient's family/caregivers. Chief Complaint: LLE pain and swelling     History of Present Illness:  Larry Kaiser is a 45 y.o. male with a PMH of bilateral lower ext venous stasis and lymphedema who presents with 2 day history of LLE pain and swelling. He reported his symptoms started Wednesday with pain in his foot. This later went up to his knee and then radiated to the groin area where he believes he had a lymph node that was swollen. On Thursday, this pain got worse and had progressed so he decided to come to the ED. 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 He reported he last had an episode of cellulitis in the summer requiring oral antibiotics that resolved. He has had erythema in his bilateral lower extremity for over two years. He does report after start of symptoms having some chills but checked his temp and it was not high. Denied any chest pain, SOB, palpitations. He has a history of SVT in the past and takes dilt. Review of Systems:  10 pt review of systems reviewed with patient and pertinent positive/negatives as per HPI. Past Medical and Surgical History:   Past Medical History:   Diagnosis Date    Asthma     CPAP (continuous positive airway pressure) dependence     Pericardial effusion     Sleep apnea     SVT (supraventricular tachycardia)        Past Surgical History:   Procedure Laterality Date    CARDIAC SURGERY         Meds/Allergies:  Prior to Admission medications    Medication Sig Start Date End Date Taking?  Authorizing Provider   Advair Diskus 250-50 MCG/DOSE inhaler Inhale 1 puff daily 8/5/21 Historical Provider, MD   albuterol (PROVENTIL HFA,VENTOLIN HFA) 90 mcg/act inhaler Inhale 2 puffs every 6 (six) hours as needed for wheezing    Historical Provider, MD   Cartia  MG 24 hr capsule  5/19/22   Historical Provider, MD   diltiazem (CARDIZEM) 120 MG tablet Take 120 mg by mouth daily  Patient not taking: Reported on 11/15/2022    Historical Provider, MD   montelukast (SINGULAIR) 10 mg tablet Take 10 mg by mouth daily at bedtime    Historical Provider, MD   omeprazole (PriLOSEC) 20 mg delayed release capsule Take 20 mg by mouth daily    Historical Provider, MD BOJORQUEZ have reviewed home medications with patient personally. Allergies: No Known Allergies    Social History:  Marital Status: /Civil Union   Occupation: paramedic  Patient Pre-hospital Living Situation: Home  Patient Pre-hospital Level of Mobility: walks  Patient Pre-hospital Diet Restrictions: none  Substance Use History:   Social History     Substance and Sexual Activity   Alcohol Use Yes    Comment: rarely     Social History     Tobacco Use   Smoking Status Never   Smokeless Tobacco Never     Social History     Substance and Sexual Activity   Drug Use Never       Family History:  Family History   Problem Relation Age of Onset    Diabetes Mother     Heart failure Mother     Hypertension Father        Physical Exam:     Vitals:   Blood Pressure: 154/90 (11/03/23 0730)  Pulse: 76 (11/03/23 0730)  Temperature: 98.6 °F (37 °C) (11/02/23 2123)  Temp Source: Oral (11/02/23 2123)  Respirations: 18 (11/03/23 0730)  SpO2: 96 % (11/03/23 0730)    Physical Exam   General: NAD, MO   HEENT: Normal conjunctiva, EOMI  Heart: Regular rate and rhythm, no murmurs  Lungs: Clear lungs, nonlabored respirations, no wheezing  Abdomen: Nontender, nondistended  Extremities: BLE with redness and chronic venous changes. Pain on palpation of LLE. No areas of fluctuance noted. No swelling noted in the anterolateral thigh area. No difference in warmth.   Neuro: Alert and oriented x3, no focal deficits  Psych: Cooperative/calm      Additional Data:     Lab Results:  Results from last 7 days   Lab Units 11/02/23  2133   WBC Thousand/uL 16.93*   HEMOGLOBIN g/dL 13.9   HEMATOCRIT % 42.9   PLATELETS Thousands/uL 271   NEUTROS PCT % 74   LYMPHS PCT % 16   MONOS PCT % 7   EOS PCT % 2     Results from last 7 days   Lab Units 11/02/23  2133   SODIUM mmol/L 138   POTASSIUM mmol/L 3.6   CHLORIDE mmol/L 103   CO2 mmol/L 26   BUN mg/dL 12   CREATININE mg/dL 0.93   ANION GAP mmol/L 9   CALCIUM mg/dL 9.2   ALBUMIN g/dL 4.1   TOTAL BILIRUBIN mg/dL 0.80   ALK PHOS U/L 94   ALT U/L 17   AST U/L 13   GLUCOSE RANDOM mg/dL 111     Results from last 7 days   Lab Units 11/02/23  2133   INR  1.08             Results from last 7 days   Lab Units 11/02/23  2254 11/02/23  2133   LACTIC ACID mmol/L 1.3  --    PROCALCITONIN ng/ml  --  0.09       Lines/Drains:  Invasive Devices       Peripheral Intravenous Line  Duration             Peripheral IV 02/16/23 Right Antecubital 259 days    Peripheral IV 11/02/23 Left Antecubital <1 day                        Imaging: Reviewed radiology reports from this admission including: ultrasound(s)  VAS lower limb venous duplex study, unilateral/limited    (Results Pending)   CT lower extremity w contrast left    (Results Pending)       EKG and Other Studies Reviewed on Admission:   EKG: NSR. . ** Please Note: This note has been constructed using a voice recognition system.  **

## 2023-11-03 NOTE — ASSESSMENT & PLAN NOTE
BP noted to be 215/99 in the ED but came down on their own without intervention to 148/76 this AM   Takes Cardizem 120 mg daily at home for SVT   Elevation was likely related to pain   Continue to monitor

## 2023-11-04 LAB
ANION GAP SERPL CALCULATED.3IONS-SCNC: 5 MMOL/L
BASOPHILS # BLD AUTO: 0.04 THOUSANDS/ÂΜL (ref 0–0.1)
BASOPHILS NFR BLD AUTO: 0 % (ref 0–1)
BUN SERPL-MCNC: 9 MG/DL (ref 5–25)
CALCIUM SERPL-MCNC: 8.6 MG/DL (ref 8.4–10.2)
CHLORIDE SERPL-SCNC: 105 MMOL/L (ref 96–108)
CO2 SERPL-SCNC: 27 MMOL/L (ref 21–32)
CREAT SERPL-MCNC: 0.78 MG/DL (ref 0.6–1.3)
EOSINOPHIL # BLD AUTO: 0.41 THOUSAND/ÂΜL (ref 0–0.61)
EOSINOPHIL NFR BLD AUTO: 3 % (ref 0–6)
ERYTHROCYTE [DISTWIDTH] IN BLOOD BY AUTOMATED COUNT: 14.2 % (ref 11.6–15.1)
GFR SERPL CREATININE-BSD FRML MDRD: 114 ML/MIN/1.73SQ M
GLUCOSE SERPL-MCNC: 107 MG/DL (ref 65–140)
HCT VFR BLD AUTO: 40.4 % (ref 36.5–49.3)
HGB BLD-MCNC: 13 G/DL (ref 12–17)
IMM GRANULOCYTES # BLD AUTO: 0.09 THOUSAND/UL (ref 0–0.2)
IMM GRANULOCYTES NFR BLD AUTO: 1 % (ref 0–2)
LYMPHOCYTES # BLD AUTO: 2.84 THOUSANDS/ÂΜL (ref 0.6–4.47)
LYMPHOCYTES NFR BLD AUTO: 21 % (ref 14–44)
MAGNESIUM SERPL-MCNC: 2 MG/DL (ref 1.9–2.7)
MCH RBC QN AUTO: 27.1 PG (ref 26.8–34.3)
MCHC RBC AUTO-ENTMCNC: 32.2 G/DL (ref 31.4–37.4)
MCV RBC AUTO: 84 FL (ref 82–98)
MONOCYTES # BLD AUTO: 0.96 THOUSAND/ÂΜL (ref 0.17–1.22)
MONOCYTES NFR BLD AUTO: 7 % (ref 4–12)
NEUTROPHILS # BLD AUTO: 9.01 THOUSANDS/ÂΜL (ref 1.85–7.62)
NEUTS SEG NFR BLD AUTO: 68 % (ref 43–75)
NRBC BLD AUTO-RTO: 0 /100 WBCS
PLATELET # BLD AUTO: 224 THOUSANDS/UL (ref 149–390)
PMV BLD AUTO: 10.4 FL (ref 8.9–12.7)
POTASSIUM SERPL-SCNC: 3.9 MMOL/L (ref 3.5–5.3)
RBC # BLD AUTO: 4.8 MILLION/UL (ref 3.88–5.62)
SODIUM SERPL-SCNC: 137 MMOL/L (ref 135–147)
WBC # BLD AUTO: 13.35 THOUSAND/UL (ref 4.31–10.16)

## 2023-11-04 PROCEDURE — 99214 OFFICE O/P EST MOD 30 MIN: CPT | Performed by: INTERNAL MEDICINE

## 2023-11-04 PROCEDURE — 85025 COMPLETE CBC W/AUTO DIFF WBC: CPT | Performed by: STUDENT IN AN ORGANIZED HEALTH CARE EDUCATION/TRAINING PROGRAM

## 2023-11-04 PROCEDURE — 83735 ASSAY OF MAGNESIUM: CPT | Performed by: STUDENT IN AN ORGANIZED HEALTH CARE EDUCATION/TRAINING PROGRAM

## 2023-11-04 PROCEDURE — 80048 BASIC METABOLIC PNL TOTAL CA: CPT | Performed by: STUDENT IN AN ORGANIZED HEALTH CARE EDUCATION/TRAINING PROGRAM

## 2023-11-04 PROCEDURE — 99233 SBSQ HOSP IP/OBS HIGH 50: CPT | Performed by: STUDENT IN AN ORGANIZED HEALTH CARE EDUCATION/TRAINING PROGRAM

## 2023-11-04 RX ADMIN — PANTOPRAZOLE SODIUM 20 MG: 20 TABLET, DELAYED RELEASE ORAL at 05:23

## 2023-11-04 RX ADMIN — CEFAZOLIN SODIUM 2000 MG: 2 SOLUTION INTRAVENOUS at 18:20

## 2023-11-04 RX ADMIN — CEFAZOLIN SODIUM 2000 MG: 2 SOLUTION INTRAVENOUS at 12:15

## 2023-11-04 RX ADMIN — FLUTICASONE FUROATE AND VILANTEROL TRIFENATATE 1 PUFF: 100; 25 POWDER RESPIRATORY (INHALATION) at 08:09

## 2023-11-04 RX ADMIN — CEFAZOLIN SODIUM 2000 MG: 2 SOLUTION INTRAVENOUS at 05:25

## 2023-11-04 RX ADMIN — DILTIAZEM HYDROCHLORIDE 120 MG: 120 CAPSULE, COATED, EXTENDED RELEASE ORAL at 08:08

## 2023-11-04 RX ADMIN — CEFAZOLIN SODIUM 2000 MG: 2 SOLUTION INTRAVENOUS at 00:14

## 2023-11-04 RX ADMIN — MONTELUKAST 10 MG: 10 TABLET, FILM COATED ORAL at 22:06

## 2023-11-04 RX ADMIN — ENOXAPARIN SODIUM 60 MG: 60 INJECTION SUBCUTANEOUS at 22:06

## 2023-11-04 RX ADMIN — ENOXAPARIN SODIUM 60 MG: 60 INJECTION SUBCUTANEOUS at 08:09

## 2023-11-04 NOTE — PLAN OF CARE
Problem: DISCHARGE PLANNING  Goal: Discharge to home or other facility with appropriate resources  Description: INTERVENTIONS:  - Identify barriers to discharge w/patient and caregiver  - Arrange for needed discharge resources and transportation as appropriate  - Identify discharge learning needs (meds, wound care, etc.)  - Arrange for interpretive services to assist at discharge as needed  - Refer to Case Management Department for coordinating discharge planning if the patient needs post-hospital services based on physician/advanced practitioner order or complex needs related to functional status, cognitive ability, or social support system  11/4/2023 0023 by Kady Lucas RN  Outcome: Progressing  11/4/2023 0023 by Kady Lucas RN  Outcome: Progressing     Problem: Knowledge Deficit  Goal: Patient/family/caregiver demonstrates understanding of disease process, treatment plan, medications, and discharge instructions  Description: Complete learning assessment and assess knowledge base.   Interventions:  - Provide teaching at level of understanding  - Provide teaching via preferred learning methods  11/4/2023 0023 by Kayd Lucas RN  Outcome: Progressing  11/4/2023 0023 by Kady Lucas RN  Outcome: Progressing

## 2023-11-04 NOTE — UTILIZATION REVIEW
Initial Clinical Review    Admission: Date/Time/Statement:   Admission Orders (From admission, onward)       Ordered        11/03/23 0051  Place in Observation  Once                          Orders Placed This Encounter   Procedures    Place in Observation     Standing Status:   Standing     Number of Occurrences:   1     Order Specific Question:   Level of Care     Answer:   Med Surg [16]     ED Arrival Information       Expected   -    Arrival   11/2/2023 21:17    Acuity   Urgent              Means of arrival   Walk-In    Escorted by   Self    Service   Hospitalist    Admission type   Emergency              Arrival complaint   Leg Swelling and Redness             Chief Complaint   Patient presents with    Leg Swelling     Left leg swelling and redness, c/o pain all the way up the leg to the groin        Initial Presentation: 45 y.o. male to ED presents for LLE pain and swelling x2 days. Per pt, his symptoms started Wednesday with pain in his foot. This later went up to his knee and then radiated to the groin area where he believes he had a lymph node that was swollen. On Thursday, this pain got worse and had progressed. 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 He has had erythema in his bilateral lower extremity for over two years. He does report after start of symptoms having some chills but checked his temp but not elevated. PMH for Bilateral lower ext venous stasis and Lymphedema. SVT on Diltiazem. GERD, HTN and Asthma. Admit Observation level of care for Cellulitis of LLE. Wbc 16.9. Iv antibiotics. CT LLE. ID consult. Pain control. 11/3  ID cons; Left leg cellulitis with lymphangitic streaking. He was previously treated by our group in 2021 for a streptococcal cellulitis and improved on Cefazolin/Cephalexin. No history of MRSA.   Change antibiotic to IV Cefazolin 2g every 6 hours (increased dose given significant BMI). Serial leg exams. Follow up CT; if any abscess would ask surgery to I&D and send cultures of purulence. F/u Bld cultures. Aggressive leg elevation and edema control. Repeat Cbc + diff in am.       ED Triage Vitals   Temperature Pulse Respirations Blood Pressure SpO2   11/02/23 2123 11/02/23 2123 11/02/23 2123 11/02/23 2123 11/02/23 2123   98.6 °F (37 °C) 102 20 151/80 96 %      Temp Source Heart Rate Source Patient Position - Orthostatic VS BP Location FiO2 (%)   11/02/23 2123 11/02/23 2123 11/02/23 2123 11/02/23 2123 --   Oral Monitor Sitting Left arm       Pain Score       11/03/23 1500       5          Wt Readings from Last 1 Encounters:   11/03/23 (!) 201 kg (443 lb 2 oz)     Additional Vital Signs:   11/04/23 0808 -- -- -- 139/70 -- -- -- -- -- --   11/04/23 07:37:16 97.9 °F (36.6 °C) 67 20 139/70 93 97 % -- -- -- --   11/03/23 21:52:01 99.2 °F (37.3 °C) 75 -- 142/74 97 94 % -- -- -- --   11/03/23 15:14:32 98.1 °F (36.7 °C) 76 -- 149/113 Abnormal  125 93 % -- -- -- --   11/03/23 1500 98.1 °F (36.7 °C) 85 18 149/113 Abnormal  -- -- -- -- --      Pertinent Labs/Diagnostic Test Results:   CT lower extremity w contrast left   Final Result by Leonard Rodgers MD (11/03 1300)   Subcutaneous edema and fat stranding in keeping with cellulitis. No well-circumscribed rim-enhancing collection to suggest abscess.       Workstation performed: [id number]         VAS lower limb venous duplex study, unilateral/limited   Final Result by Jasmine Orozco MD (11/03 1708)            Results from last 7 days   Lab Units 11/04/23  0521 11/02/23 2133   WBC Thousand/uL 13.35* 16.93*   HEMOGLOBIN g/dL 13.0 13.9   HEMATOCRIT % 40.4 42.9   PLATELETS Thousands/uL 224 271   NEUTROS ABS Thousands/µL 9.01* 12.53*         Results from last 7 days   Lab Units 11/04/23  0521 11/02/23  2133   SODIUM mmol/L 137 138   POTASSIUM mmol/L 3.9 3.6   CHLORIDE mmol/L 105 103   CO2 mmol/L 27 26   ANION GAP mmol/L 5 9   BUN mg/dL 9 12 CREATININE mg/dL 0.78 0.93   EGFR ml/min/1.73sq m 114 103   CALCIUM mg/dL 8.6 9.2   MAGNESIUM mg/dL 2.0  --      Results from last 7 days   Lab Units 11/02/23  2133   AST U/L 13   ALT U/L 17   ALK PHOS U/L 94   TOTAL PROTEIN g/dL 7.9   ALBUMIN g/dL 4.1   TOTAL BILIRUBIN mg/dL 0.80         Results from last 7 days   Lab Units 11/04/23  0521 11/02/23  2133   GLUCOSE RANDOM mg/dL 107 111       Results from last 7 days   Lab Units 11/02/23  2133   PROTIME seconds 14.6*   INR  1.08   PTT seconds 35         Results from last 7 days   Lab Units 11/02/23  2133   PROCALCITONIN ng/ml 0.09     Results from last 7 days   Lab Units 11/02/23  2254   LACTIC ACID mmol/L 1.3           Results from last 7 days   Lab Units 11/02/23  2254 11/02/23 2133   BLOOD CULTURE  No Growth at 24 hrs. No Growth at 24 hrs.        ED Treatment:   Medication Administration from 11/02/2023 2117 to 11/03/2023 1454         Date/Time Order Dose Route Action     11/02/2023 2353 EDT cefTRIAXone (ROCEPHIN) IVPB (premix in dextrose) 2,000 mg 50 mL 0 mg Intravenous Stopped     11/02/2023 2323 EDT cefTRIAXone (ROCEPHIN) IVPB (premix in dextrose) 2,000 mg 50 mL 2,000 mg Intravenous New Bag     11/03/2023 1013 EDT Fluticasone Furoate-Vilanterol 100-25 mcg/actuation 1 puff 1 puff Inhalation Given     11/03/2023 0958 EDT pantoprazole (PROTONIX) EC tablet 20 mg 20 mg Oral Given     11/03/2023 2967 EDT enoxaparin (LOVENOX) subcutaneous injection 60 mg 60 mg Subcutaneous Given     11/03/2023 0902 EDT iohexol (OMNIPAQUE) 350 MG/ML injection (MULTI-DOSE) 100 mL 100 mL Intravenous Given     11/03/2023 1304 EDT ceFAZolin (ANCEF) IVPB (premix in dextrose) 2,000 mg 50 mL 2,000 mg Intravenous New Bag          Past Medical History:   Diagnosis Date    Asthma     CPAP (continuous positive airway pressure) dependence     Pericardial effusion     Sleep apnea     SVT (supraventricular tachycardia)      Present on Admission:   Cellulitis of left lower extremity   Hypertension Asthma   GERD (gastroesophageal reflux disease)      Admitting Diagnosis: Cellulitis [L03.90]  Leg swelling [M79.89]  Age/Sex: 45 y.o. male    Admission Orders:  Scheduled Medications:  cefazolin, 2,000 mg, Intravenous, Q6H  diltiazem, 120 mg, Oral, Daily  enoxaparin, 60 mg, Subcutaneous, Q12H ÁNGEL  Fluticasone Furoate-Vilanterol, 1 puff, Inhalation, Daily  montelukast, 10 mg, Oral, HS  pantoprazole, 20 mg, Oral, Early Morning      Continuous IV Infusions:     PRN Meds:  acetaminophen, 650 mg, Oral, Q6H PRN  albuterol, 2 puff, Inhalation, Q6H PRN        IP CONSULT TO INFECTIOUS DISEASES    Network Utilization Review Department  ATTENTION: Please call with any questions or concerns to [phone] and carefully listen to the prompts so that you are directed to the right person. All voicemails are confidential.   For Discharge needs, contact Care Management DC Support Team at [phone] opt. 2  Send all requests for admission clinical reviews, approved or denied determinations and any other requests to dedicated fax number below belonging to the campus where the patient is receiving treatment.  List of dedicated fax numbers for the Facilities:  [place] DENIALS (Administrative/Medical Necessity) [phone]   DISCHARGE SUPPORT TEAM (NETWORK) [id number] (Maternity/NICU/Pediatrics) [phone]   [address] [place] [address] Drive [phone]   [facility] [address] [facility] [phone] [address] [facility] [address] [address] [facility] [phone]   [address] [phone]   [address] [place] [address]  [address] [phone]

## 2023-11-04 NOTE — PROGRESS NOTES
Progress Note - Infectious Disease   Coreas Chanelle 45 y.o. male MRN: [id number]  Unit/Bed#: -01 Encounter: [id number]      Impression/Plan:  Left leg cellulitis with lymphangitic streaking  - In setting of chronic edema/venous stasis. Appearance is very consistent with Streptococcal infection. No clear abscess on exam and CT negative for abscess on 11/03. Cellulitis appears to overlay left ankle joint some, but no clear signs of joint involvement as ROM intact. He was previously treated by our group in 2021 for a streptococcal cellulitis and improved on Cefazolin/Cephalexin. No history of MRSA. Today the erythema appears stable and actually slightly improved, WBC is down trending.   -Continue IV Cefazolin 2g every 6 hours (increased dose given significant BMI)  -Would plan to continue IV antibiotics for now until pain starts to improve with hopes to de-escalation to PO Cephalexin over next 48 hours to complete course  -Serial leg exams  -Follow up blood cultures  -Aggressive leg elevation for edema control  -Repeat CBC + diff in AM    Leukocytosis  - 16.9 on arrival. Suspect in setting of acute cellulitis as above. Also consider possibility of bacteremia. Otherwise no pulmonary, GI or  complaints and LFTs normal. Down trending today.  -Antibiotics as above  -Repeat CBC + diff in AM  -Follow up blood cultures     Bilateral lower extremity venosus stasis   - Chronic, significant risk factor for cellulitis. Morbid obesity (BMI 64)  - Risk factor for cellulitis. Asthma  -Does not appear to be in exacerbation     I have discussed the above management plan in detail with the primary service    Antibiotics:  Cefazolin: 2    24 Hour Events:  Afebrile. CT scan negative for abscess. Subjective:  Patient has no fever, chills. He still has significant leg pain, mostly when trying to stand, but is stable and not worsening.      Objective:  Vitals:  Temp:  [97.9 °F (36.6 °C)-99.2 °F (37.3 °C)] 97.9 °F (36.6 °C)  HR:  [67-85] 67  Resp:  [18-20] 20  BP: (134-149)/() 139/70  SpO2:  [90 %-97 %] 97 %  Temp (24hrs), Av.3 °F (36.8 °C), Min:97.9 °F (36.6 °C), Max:99.2 °F (37.3 °C)  Current: Temperature: 97.9 °F (36.6 °C)    Physical Exam:   General Appearance:  Alert, interactive, nontoxic, no acute distress. Throat: Oropharynx moist without lesions. Lungs:   Clear to auscultation bilaterally; no wheezes, rhonchi or rales; respirations unlabored   Heart:  RRR; no murmur, rub or gallop   Abdomen:   Soft, non-tender, non-distended, positive bowel sounds. Extremities: Bilateral venous stasis changes of lower legs; left leg with edema and erythema over tibial area extending down to ankle, though less red today; lymphangitic streaking noted up leg toward groin    Skin: No new rashes or lesions. No draining wounds noted. Labs: All pertinent labs and imaging studies were personally reviewed  Results from last 7 days   Lab Units 23  0521 23  2133   WBC Thousand/uL 13.35* 16.93*   HEMOGLOBIN g/dL 13.0 13.9   PLATELETS Thousands/uL 224 271     Results from last 7 days   Lab Units 23  0521 23  2133   SODIUM mmol/L 137 138   POTASSIUM mmol/L 3.9 3.6   CHLORIDE mmol/L 105 103   CO2 mmol/L 27 26   BUN mg/dL 9 12   CREATININE mg/dL 0.78 0.93   EGFR ml/min/1.73sq m 114 103   CALCIUM mg/dL 8.6 9.2   AST U/L  --  13   ALT U/L  --  17   ALK PHOS U/L  --  94     Results from last 7 days   Lab Units 23  2133   PROCALCITONIN ng/ml 0.09                   Micro:  Results from last 7 days   Lab Units 23  2254 23   BLOOD CULTURE  No Growth at 24 hrs. No Growth at 24 hrs.        Imaging:          Eben Sanders MD  [facility]

## 2023-11-04 NOTE — ASSESSMENT & PLAN NOTE
L leg erythema (also present in RLE) is chronic for over two years -- past history of chronic venous stasis and lymphedema   He reported that on Wednesday prior to admission his symptoms started with pain in the foot and mid shin. Thursday pain started in the groin. WBC count 16.9  Given a dose of ctx 2gm in the ED   Reported chills at home but no documented fevers   Left lower extremity Doppler showed no evidence of acute or chronic DVT.   No evidence of superficial thrombophlebitis  Procal was found to be low   Continue ctx 2 gm daily for cellulitis coverage   CT LLE did not show any abscess or deeper infection  ID consult-recommended continuing Ancef, possible transition to oral antibiotics in 24 to 48 hours  Pain control with tylenol, can escalate as needed

## 2023-11-04 NOTE — ASSESSMENT & PLAN NOTE
BP noted to be 215/99 in the ED but came down on their own without intervention to 148/76 this AM   Takes Cardizem 120 mg daily at home for SVT - resumed   BP acceptable today   Continue to monitor

## 2023-11-04 NOTE — PROGRESS NOTES
[address]  Progress Note  Name: Claudia Farrar  MRN: [id number]  Unit/Bed#: -01 I Date of Admission: 11/2/2023   Date of Service: 11/4/2023 I Hospital Day: 0    Assessment/Plan   * Cellulitis of left lower extremity  Assessment & Plan  L leg erythema (also present in RLE) is chronic for over two years -- past history of chronic venous stasis and lymphedema   He reported that on Wednesday prior to admission his symptoms started with pain in the foot and mid shin. Thursday pain started in the groin. WBC count 16.9  Given a dose of ctx 2gm in the ED   Reported chills at home but no documented fevers   Left lower extremity Doppler showed no evidence of acute or chronic DVT. No evidence of superficial thrombophlebitis  Procal was found to be low   Continue ctx 2 gm daily for cellulitis coverage   CT LLE did not show any abscess or deeper infection  ID consult-recommended continuing Ancef, possible transition to oral antibiotics in 24 to 48 hours  Pain control with tylenol, can escalate as needed           Hypertension  Assessment & Plan  BP noted to be 215/99 in the ED but came down on their own without intervention to 148/76 this AM   Takes Cardizem 120 mg daily at home for SVT - resumed   BP acceptable today   Continue to monitor     Asthma  Assessment & Plan  Takes Advair, montelukast and as needed albuterol at home   Continue home meds   No signs of exac    GERD (gastroesophageal reflux disease)  Assessment & Plan  Continue PPI                VTE Pharmacologic Prophylaxis: VTE Score: 5 Moderate Risk (Score 3-4) - Pharmacological DVT Prophylaxis Ordered: enoxaparin (Lovenox). Patient Centered Rounds: I performed bedside rounds with nursing staff today. Discussions with Specialists or Other Care Team Provider: ID    Education and Discussions with Family / Patient: Patient declined call to . Total Time Spent on Date of Encounter in care of patient: 35 mins.  This time was spent on one or more of the following: performing physical exam; counseling and coordination of care; obtaining or reviewing history; documenting in the medical record; reviewing/ordering tests, medications or procedures; communicating with other healthcare professionals and discussing with patient's family/caregivers. Current Length of Stay: 0 day(s)  Current Patient Status: Observation   Certification Statement: The patient will continue to require additional inpatient hospital stay due to IV abx   Discharge Plan: Anticipate discharge in 24-48 hrs to home. Code Status: Level 1 - Full Code    Subjective:   Reported pain is slightly improved. No fevers, chills. Objective:     Vitals:   Temp (24hrs), Av.4 °F (36.9 °C), Min:97.9 °F (36.6 °C), Max:99.2 °F (37.3 °C)    Temp:  [97.9 °F (36.6 °C)-99.2 °F (37.3 °C)] 98.1 °F (36.7 °C)  HR:  [67-75] 69  Resp:  [16-20] 16  BP: (139-155)/(70-81) 155/81  SpO2:  [94 %-97 %] 94 %  Body mass index is 63.58 kg/m². Input and Output Summary (last 24 hours): Intake/Output Summary (Last 24 hours) at 2023 1538  Last data filed at 2023 0901  Gross per 24 hour   Intake 480 ml   Output 300 ml   Net 180 ml       Physical Exam:   Physical Exam   General: NAD  HEENT: Normal conjunctiva, EOMI  Heart: Regular rate and rhythm, no murmurs  Lungs: Clear lungs, nonlabored respirations, no wheezing  Abdomen: Nontender, nondistended  Extremities: BLE erythema with changes of venous stasis.   Neuro: Alert and oriented x3   Psych: Cooperative/calm      Additional Data:     Labs:  Results from last 7 days   Lab Units 23  0521   WBC Thousand/uL 13.35*   HEMOGLOBIN g/dL 13.0   HEMATOCRIT % 40.4   PLATELETS Thousands/uL 224   NEUTROS PCT % 68   LYMPHS PCT % 21   MONOS PCT % 7   EOS PCT % 3     Results from last 7 days   Lab Units 23  0521 23  2133   SODIUM mmol/L 137 138   POTASSIUM mmol/L 3.9 3.6   CHLORIDE mmol/L 105 103   CO2 mmol/L 27 26   BUN mg/dL 9 12   CREATININE mg/dL 0.78 0.93   ANION GAP mmol/L 5 9   CALCIUM mg/dL 8.6 9.2   ALBUMIN g/dL  --  4.1   TOTAL BILIRUBIN mg/dL  --  0.80   ALK PHOS U/L  --  94   ALT U/L  --  17   AST U/L  --  13   GLUCOSE RANDOM mg/dL 107 111     Results from last 7 days   Lab Units 11/02/23 2133   INR  1.08             Results from last 7 days   Lab Units 11/02/23 2254 11/02/23 2133   LACTIC ACID mmol/L 1.3  --    PROCALCITONIN ng/ml  --  0.09       Lines/Drains:  Invasive Devices       Peripheral Intravenous Line  Duration             Peripheral IV 11/02/23 Left Antecubital 1 day                          Imaging: Reviewed radiology reports from this admission including: CT LLE     Recent Cultures (last 7 days):   Results from last 7 days   Lab Units 11/02/23 2254 11/02/23 2133   BLOOD CULTURE  No Growth at 24 hrs. No Growth at 24 hrs. Last 24 Hours Medication List:   Current Facility-Administered Medications   Medication Dose Route Frequency Provider Last Rate    acetaminophen  650 mg Oral Q6H PRN Quiana Porter MD      albuterol  2 puff Inhalation Q6H PRN Quiana Porter MD      cefazolin  2,000 mg Intravenous Q6H Oskar Becker MD 2,000 mg (11/04/23 1215)    diltiazem  120 mg Oral Daily Quiana Porter MD      enoxaparin  60 mg Subcutaneous Q12H [address] Quiana Porter MD      Fluticasone Furoate-Vilanterol  1 puff Inhalation Daily Quiana Porter MD      montelukast  10 mg Oral HS Quiana Porter MD      pantoprazole  20 mg Oral Early Morning Quiana Porter MD          Today, Patient Was Seen By: Quiana Porter MD    **Please Note: This note may have been constructed using a voice recognition system. **

## 2023-11-05 VITALS
HEART RATE: 82 BPM | WEIGHT: 315 LBS | BODY MASS INDEX: 45.1 KG/M2 | SYSTOLIC BLOOD PRESSURE: 139 MMHG | TEMPERATURE: 98.2 F | OXYGEN SATURATION: 94 % | HEIGHT: 70 IN | DIASTOLIC BLOOD PRESSURE: 75 MMHG | RESPIRATION RATE: 21 BRPM

## 2023-11-05 LAB
ANION GAP SERPL CALCULATED.3IONS-SCNC: 5 MMOL/L
BASOPHILS # BLD AUTO: 0.03 THOUSANDS/ÂΜL (ref 0–0.1)
BASOPHILS NFR BLD AUTO: 0 % (ref 0–1)
BUN SERPL-MCNC: 8 MG/DL (ref 5–25)
CALCIUM SERPL-MCNC: 9.1 MG/DL (ref 8.4–10.2)
CHLORIDE SERPL-SCNC: 105 MMOL/L (ref 96–108)
CO2 SERPL-SCNC: 29 MMOL/L (ref 21–32)
CREAT SERPL-MCNC: 0.79 MG/DL (ref 0.6–1.3)
EOSINOPHIL # BLD AUTO: 0.4 THOUSAND/ÂΜL (ref 0–0.61)
EOSINOPHIL NFR BLD AUTO: 3 % (ref 0–6)
ERYTHROCYTE [DISTWIDTH] IN BLOOD BY AUTOMATED COUNT: 14.2 % (ref 11.6–15.1)
GFR SERPL CREATININE-BSD FRML MDRD: 113 ML/MIN/1.73SQ M
GLUCOSE P FAST SERPL-MCNC: 97 MG/DL (ref 65–99)
GLUCOSE SERPL-MCNC: 97 MG/DL (ref 65–140)
HCT VFR BLD AUTO: 44.2 % (ref 36.5–49.3)
HGB BLD-MCNC: 13.9 G/DL (ref 12–17)
IMM GRANULOCYTES # BLD AUTO: 0.13 THOUSAND/UL (ref 0–0.2)
IMM GRANULOCYTES NFR BLD AUTO: 1 % (ref 0–2)
LYMPHOCYTES # BLD AUTO: 2.8 THOUSANDS/ÂΜL (ref 0.6–4.47)
LYMPHOCYTES NFR BLD AUTO: 20 % (ref 14–44)
MAGNESIUM SERPL-MCNC: 2.1 MG/DL (ref 1.9–2.7)
MCH RBC QN AUTO: 26.7 PG (ref 26.8–34.3)
MCHC RBC AUTO-ENTMCNC: 31.4 G/DL (ref 31.4–37.4)
MCV RBC AUTO: 85 FL (ref 82–98)
MONOCYTES # BLD AUTO: 1.09 THOUSAND/ÂΜL (ref 0.17–1.22)
MONOCYTES NFR BLD AUTO: 8 % (ref 4–12)
NEUTROPHILS # BLD AUTO: 9.31 THOUSANDS/ÂΜL (ref 1.85–7.62)
NEUTS SEG NFR BLD AUTO: 68 % (ref 43–75)
NRBC BLD AUTO-RTO: 0 /100 WBCS
PLATELET # BLD AUTO: 300 THOUSANDS/UL (ref 149–390)
PMV BLD AUTO: 9.9 FL (ref 8.9–12.7)
POTASSIUM SERPL-SCNC: 4.1 MMOL/L (ref 3.5–5.3)
RBC # BLD AUTO: 5.2 MILLION/UL (ref 3.88–5.62)
SODIUM SERPL-SCNC: 139 MMOL/L (ref 135–147)
WBC # BLD AUTO: 13.76 THOUSAND/UL (ref 4.31–10.16)

## 2023-11-05 PROCEDURE — 80048 BASIC METABOLIC PNL TOTAL CA: CPT | Performed by: STUDENT IN AN ORGANIZED HEALTH CARE EDUCATION/TRAINING PROGRAM

## 2023-11-05 PROCEDURE — 99214 OFFICE O/P EST MOD 30 MIN: CPT | Performed by: INTERNAL MEDICINE

## 2023-11-05 PROCEDURE — 83735 ASSAY OF MAGNESIUM: CPT | Performed by: STUDENT IN AN ORGANIZED HEALTH CARE EDUCATION/TRAINING PROGRAM

## 2023-11-05 PROCEDURE — 85025 COMPLETE CBC W/AUTO DIFF WBC: CPT | Performed by: STUDENT IN AN ORGANIZED HEALTH CARE EDUCATION/TRAINING PROGRAM

## 2023-11-05 PROCEDURE — 99239 HOSP IP/OBS DSCHRG MGMT >30: CPT | Performed by: STUDENT IN AN ORGANIZED HEALTH CARE EDUCATION/TRAINING PROGRAM

## 2023-11-05 RX ORDER — CEPHALEXIN 500 MG/1
1000 CAPSULE ORAL EVERY 6 HOURS SCHEDULED
Qty: 56 CAPSULE | Refills: 0 | Status: SHIPPED | OUTPATIENT
Start: 2023-11-05 | End: 2023-11-12

## 2023-11-05 RX ADMIN — FLUTICASONE FUROATE AND VILANTEROL TRIFENATATE 1 PUFF: 100; 25 POWDER RESPIRATORY (INHALATION) at 09:44

## 2023-11-05 RX ADMIN — CEFAZOLIN SODIUM 2000 MG: 2 SOLUTION INTRAVENOUS at 00:19

## 2023-11-05 RX ADMIN — PANTOPRAZOLE SODIUM 20 MG: 20 TABLET, DELAYED RELEASE ORAL at 06:22

## 2023-11-05 RX ADMIN — DILTIAZEM HYDROCHLORIDE 120 MG: 120 CAPSULE, COATED, EXTENDED RELEASE ORAL at 09:44

## 2023-11-05 RX ADMIN — CEFAZOLIN SODIUM 2000 MG: 2 SOLUTION INTRAVENOUS at 06:22

## 2023-11-05 RX ADMIN — CEFAZOLIN SODIUM 2000 MG: 2 SOLUTION INTRAVENOUS at 12:42

## 2023-11-05 RX ADMIN — CEFAZOLIN SODIUM 2000 MG: 2 SOLUTION INTRAVENOUS at 17:57

## 2023-11-05 RX ADMIN — ENOXAPARIN SODIUM 60 MG: 60 INJECTION SUBCUTANEOUS at 09:44

## 2023-11-05 NOTE — ASSESSMENT & PLAN NOTE
L leg erythema (also present in RLE) is chronic for over two years -- past history of chronic venous stasis and lymphedema   He reported that on Wednesday prior to admission his symptoms started with pain in the foot and mid shin. Thursday pain started in the groin. WBC count 16.9  Given a dose of ctx 2gm in the ED   Reported chills at home but no documented fevers   Left lower extremity Doppler showed no evidence of acute or chronic DVT. No evidence of superficial thrombophlebitis  Procal was found to be low   Continue ctx 2 gm daily for cellulitis coverage   CT LLE did not show any abscess or deeper infection  ID consult-recommended Ancef  Pain control with tylenol, can escalate as needed   Discussed with ID service, discharged with oral cephalexin 1 g every 6 hours for total 7 days.  This was sent to patient's pharmacy   Follow up with PCP in 1 week

## 2023-11-05 NOTE — DISCHARGE SUMMARY
[address]  Discharge- Claudell Mose 1985, 45 y.o. male MRN: [id number]  Unit/Bed#: -01 Encounter: [id number]  Primary Care Provider: Cathy Anthony MD   Date and time admitted to hospital: 11/2/2023 10:12 PM    * Cellulitis of left lower extremity  Assessment & Plan  L leg erythema (also present in RLE) is chronic for over two years -- past history of chronic venous stasis and lymphedema   He reported that on Wednesday prior to admission his symptoms started with pain in the foot and mid shin. Thursday pain started in the groin. WBC count 16.9  Given a dose of ctx 2gm in the ED   Reported chills at home but no documented fevers   Left lower extremity Doppler showed no evidence of acute or chronic DVT. No evidence of superficial thrombophlebitis  Procal was found to be low   Continue ctx 2 gm daily for cellulitis coverage   CT LLE did not show any abscess or deeper infection  ID consult-recommended Ancef  Pain control with tylenol, can escalate as needed   Discussed with ID service, discharged with oral cephalexin 1 g every 6 hours for total 7 days.  This was sent to patient's pharmacy   Follow up with PCP in 1 week           Hypertension  Assessment & Plan  BP noted to be 215/99 in the ED but came down on their own without intervention to 148/76 this AM   Takes Cardizem 120 mg daily at home for SVT - resumed   BP acceptable today   Continue to monitor     Asthma  Assessment & Plan  Takes Advair, montelukast and as needed albuterol at home   Continue home meds   No signs of exac    GERD (gastroesophageal reflux disease)  Assessment & Plan  Continue PPI         Medical Problems       Resolved Problems  Date Reviewed: 7/29/2023   None       Discharging Physician / Practitioner: Ping Ballesteros MD  PCP: Cathy Anthony MD  Admission Date:   Admission Orders (From admission, onward)       Ordered        11/03/23 0051  Place in Observation  Once                          Discharge Date: 11/05/23    Consultations During Hospital Stay:  ID    Procedures Performed:   none    Significant Findings / Test Results:   Cellulitis     Incidental Findings:   none       Test Results Pending at Discharge (will require follow up):   none     Outpatient Tests Requested:  none    Complications:  none    Reason for Admission: Cellulitis    Hospital Course:   Domenica Engle is a 45 y.o. male patient who originally presented to the hospital on 11/2/2023 due to LLE pain and swelling. Dopplers negative for DVT. CT scan did not show any deeper infection did show signs of subcutaneous fat stranding consistent with cellulitis. He was started on Ancef IV. ID was consulted. Discharged with additional 7 days of oral cephalexin. He is advised to follow-up with his PCP within 1 to 2 weeks. Please see above list of diagnoses and related plan for additional information. Condition at Discharge: good    Discharge Day Visit / Exam:   Subjective: Feeling well. Pain improving. Ready to be discharged. Vitals: Blood Pressure: 139/75 (11/05/23 1520)  Pulse: 82 (11/05/23 1520)  Temperature: 98.2 °F (36.8 °C) (11/05/23 1520)  Temp Source: Oral (11/03/23 1514)  Respirations: 21 (11/05/23 1520)  Height: 5' 10" (177.8 cm) (11/03/23 1514)  Weight - Scale: (!) 201 kg (443 lb 2 oz) (11/03/23 1514)  SpO2: 94 % (11/05/23 1520)  Exam:   Physical Exam   General: No acute distress, appears comfortable  HEENT: Normal conjunctiva, EOMI  Neck: No palpable lymphadenopathy, no JVD  Heart: Regular rate and rhythm, no murmurs  Lungs: Clear lungs, nonlabored respirations, no wheezing  Abdomen: Nontender, nondistended  Extremities: LLE swelling and erythema improving   Neuro: Alert and oriented x3, no focal deficits  Psych: Cooperative/calm      Discussion with Family: Patient declined call to .      Discharge instructions/Information to patient and family:   See after visit summary for information provided to patient and family. Provisions for Follow-Up Care:  See after visit summary for information related to follow-up care and any pertinent home health orders. Disposition:   Home    Planned Readmission: none     Discharge Statement:  I spent 35 minutes discharging the patient. This time was spent on the day of discharge. I had direct contact with the patient on the day of discharge. Greater than 50% of the total time was spent examining patient, answering all patient questions, arranging and discussing plan of care with patient as well as directly providing post-discharge instructions. Additional time then spent on discharge activities. Discharge Medications:  See after visit summary for reconciled discharge medications provided to patient and/or family.       **Please Note: This note may have been constructed using a voice recognition system**

## 2023-11-05 NOTE — PROGRESS NOTES
Progress Note - Infectious Disease   Army Shy 45 y.o. male MRN: [id number]  Unit/Bed#: -01 Encounter: [id number]      Impression/Plan:  Left leg cellulitis with lymphangitic streaking  - In setting of chronic edema/venous stasis. Appearance is very consistent with Streptococcal infection. No clear abscess on exam and CT negative for abscess on 11/03. Cellulitis appears to overlay left ankle joint some, but no clear signs of joint involvement as ROM intact. He was previously treated by our group in 2021 for a streptococcal cellulitis and improved on Cefazolin/Cephalexin. No history of MRSA. Cellulitis has markedly improved on today's exam with much improved erythema, decreased tenderness and resolution of lymphangitic streaking. Remains afebrile   -Continue IV Cefazolin 2g every 6 hours (increased dose given significant BMI)  -I discussed with patient today as he is eager to go home; as he has markedly improved overall clinically, is afebrile and not bacteremic I am ok with this  -Would plan to discharge on PO Cephalexin 1000 mg every 6 hours (increased dosing given BMI)  -Would complete total 10 days of antibiotic, through 11/12  -Counseled patient to see PCP in one week for follow up and on return precautions    Leukocytosis  - 16.9 on arrival. Suspect in setting of acute cellulitis as above. Also consider possibility of bacteremia. Otherwise no pulmonary, GI or  complaints and LFTs normal. Down trending.  -Antibiotics as above  -PCP follow up as above     Bilateral lower extremity venosus stasis   - Chronic, significant risk factor for cellulitis. Morbid obesity (BMI 64)  - Risk factor for cellulitis. Asthma  -Does not appear to be in exacerbation     I have discussed the above management plan in detail with the primary service    Antibiotics:  Cefazolin: 3    24 Hour Events:  Afebrile. Subjective:  Patient has no fever, chills. His leg is much better today.  He has less redness and much less pain. The streaking up the leg has resolved. No new issues. He is eager to go home. Objective:  Vitals:  Temp:  [97.9 °F (36.6 °C)-98.3 °F (36.8 °C)] 97.9 °F (36.6 °C)  HR:  [72-74] 72  BP: (121-143)/(64-73) 143/73  SpO2:  [92 %-95 %] 95 %  Temp (24hrs), Av.1 °F (36.7 °C), Min:97.9 °F (36.6 °C), Max:98.3 °F (36.8 °C)  Current: Temperature: 97.9 °F (36.6 °C)    Physical Exam:   General Appearance:  Alert, interactive, nontoxic, no acute distress. Throat: Oropharynx moist without lesions. Lungs:   Clear to auscultation bilaterally; no wheezes, rhonchi or rales; respirations unlabored   Heart:  RRR; no murmur, rub or gallop   Abdomen:   Soft, non-tender, non-distended, positive bowel sounds. Extremities: Bilateral venous stasis changes of lower legs; left leg with resolved lymphangtic streaking and erythema of anterior leg less red and receeding, improving tenderness    Skin: No new rashes or lesions. No draining wounds noted. Labs: All pertinent labs and imaging studies were personally reviewed  Results from last 7 days   Lab Units 23  0623  2133   WBC Thousand/uL 13.76* 13.35* 16.93*   HEMOGLOBIN g/dL 13.9 13.0 13.9   PLATELETS Thousands/uL 300 224 271     Results from last 7 days   Lab Units 23  0623  0523  2133   SODIUM mmol/L 139 137 138   POTASSIUM mmol/L 4.1 3.9 3.6   CHLORIDE mmol/L 105 105 103   CO2 mmol/L 29 27 26   BUN mg/dL 8 9 12   CREATININE mg/dL 0.79 0.78 0.93   EGFR ml/min/1.73sq m 113 114 103   CALCIUM mg/dL 9.1 8.6 9.2   AST U/L  --   --  13   ALT U/L  --   --  17   ALK PHOS U/L  --   --  94     Results from last 7 days   Lab Units 23   PROCALCITONIN ng/ml 0.09                   Micro:  Results from last 7 days   Lab Units 23   BLOOD CULTURE  No Growth at 48 hrs. No Growth at 48 hrs.        Imaging:          Sharda Vela MD  [facility]

## 2023-11-08 LAB
BACTERIA BLD CULT: NORMAL
BACTERIA BLD CULT: NORMAL

## 2024-02-08 ENCOUNTER — HOSPITAL ENCOUNTER (OUTPATIENT)
Facility: HOSPITAL | Age: 39
Setting detail: OBSERVATION
Discharge: HOME | End: 2024-02-09
Attending: EMERGENCY MEDICINE | Admitting: STUDENT IN AN ORGANIZED HEALTH CARE EDUCATION/TRAINING PROGRAM
Payer: COMMERCIAL

## 2024-02-08 DIAGNOSIS — I47.10 SVT (SUPRAVENTRICULAR TACHYCARDIA): ICD-10-CM

## 2024-02-08 DIAGNOSIS — R00.2 PALPITATIONS: Primary | ICD-10-CM

## 2024-02-08 LAB
ATRIAL RATE: 156 BPM
GLUCOSE SERPL-MCNC: 99 MG/DL (ref 65–140)
QRS AXIS: 52 DEGREES
QRSD INTERVAL: 76 MS
QT INTERVAL: 308 MS
QTC INTERVAL: 475 MS
T WAVE AXIS: 256 DEGREES
VENTRICULAR RATE: 143 BPM

## 2024-02-08 PROCEDURE — 99285 EMERGENCY DEPT VISIT HI MDM: CPT

## 2024-02-08 PROCEDURE — 93005 ELECTROCARDIOGRAM TRACING: CPT

## 2024-02-08 PROCEDURE — 82948 REAGENT STRIP/BLOOD GLUCOSE: CPT

## 2024-02-09 ENCOUNTER — APPOINTMENT (EMERGENCY)
Dept: RADIOLOGY | Facility: HOSPITAL | Age: 39
End: 2024-02-09
Payer: COMMERCIAL

## 2024-02-09 ENCOUNTER — APPOINTMENT (OUTPATIENT)
Dept: NON INVASIVE DIAGNOSTICS | Facility: HOSPITAL | Age: 39
End: 2024-02-09
Payer: COMMERCIAL

## 2024-02-09 VITALS
HEART RATE: 70 BPM | HEIGHT: 70 IN | SYSTOLIC BLOOD PRESSURE: 169 MMHG | TEMPERATURE: 98.1 F | RESPIRATION RATE: 18 BRPM | BODY MASS INDEX: 45.1 KG/M2 | DIASTOLIC BLOOD PRESSURE: 87 MMHG | OXYGEN SATURATION: 97 % | WEIGHT: 315 LBS

## 2024-02-09 DIAGNOSIS — I47.10 SVT (SUPRAVENTRICULAR TACHYCARDIA): Primary | ICD-10-CM

## 2024-02-09 LAB
2HR DELTA HS TROPONIN: -1 NG/L
4HR DELTA HS TROPONIN: -3 NG/L
ALBUMIN SERPL BCP-MCNC: 3.7 G/DL (ref 3.5–5)
ALP SERPL-CCNC: 102 U/L (ref 34–104)
ALT SERPL W P-5'-P-CCNC: 17 U/L (ref 7–52)
ANION GAP SERPL CALCULATED.3IONS-SCNC: 7 MMOL/L
ANION GAP SERPL CALCULATED.3IONS-SCNC: 7 MMOL/L
AORTIC ROOT: 4 CM
APICAL FOUR CHAMBER EJECTION FRACTION: 66 %
ASCENDING AORTA: 3.9 CM
AST SERPL W P-5'-P-CCNC: 14 U/L (ref 13–39)
ATRIAL RATE: 79 BPM
BASOPHILS # BLD AUTO: 0.03 THOUSANDS/ÂΜL (ref 0–0.1)
BASOPHILS # BLD AUTO: 0.05 THOUSANDS/ÂΜL (ref 0–0.1)
BASOPHILS NFR BLD AUTO: 0 % (ref 0–1)
BASOPHILS NFR BLD AUTO: 0 % (ref 0–1)
BILIRUB SERPL-MCNC: 0.45 MG/DL (ref 0.2–1)
BNP SERPL-MCNC: 57 PG/ML (ref 0–100)
BSA FOR ECHO PROCEDURE: 2.93 M2
BUN SERPL-MCNC: 16 MG/DL (ref 5–25)
BUN SERPL-MCNC: 18 MG/DL (ref 5–25)
CALCIUM SERPL-MCNC: 8.2 MG/DL (ref 8.4–10.2)
CALCIUM SERPL-MCNC: 9.1 MG/DL (ref 8.4–10.2)
CARDIAC TROPONIN I PNL SERPL HS: 10 NG/L
CARDIAC TROPONIN I PNL SERPL HS: 7 NG/L
CARDIAC TROPONIN I PNL SERPL HS: 9 NG/L
CHLORIDE SERPL-SCNC: 104 MMOL/L (ref 96–108)
CHLORIDE SERPL-SCNC: 109 MMOL/L (ref 96–108)
CO2 SERPL-SCNC: 22 MMOL/L (ref 21–32)
CO2 SERPL-SCNC: 27 MMOL/L (ref 21–32)
CREAT SERPL-MCNC: 0.67 MG/DL (ref 0.6–1.3)
CREAT SERPL-MCNC: 0.92 MG/DL (ref 0.6–1.3)
E WAVE DECELERATION TIME: 260 MS
E/A RATIO: 1.68
EOSINOPHIL # BLD AUTO: 0.38 THOUSAND/ÂΜL (ref 0–0.61)
EOSINOPHIL # BLD AUTO: 0.41 THOUSAND/ÂΜL (ref 0–0.61)
EOSINOPHIL NFR BLD AUTO: 3 % (ref 0–6)
EOSINOPHIL NFR BLD AUTO: 3 % (ref 0–6)
ERYTHROCYTE [DISTWIDTH] IN BLOOD BY AUTOMATED COUNT: 14.1 % (ref 11.6–15.1)
ERYTHROCYTE [DISTWIDTH] IN BLOOD BY AUTOMATED COUNT: 14.3 % (ref 11.6–15.1)
FRACTIONAL SHORTENING: 36 (ref 28–44)
GFR SERPL CREATININE-BSD FRML MDRD: 105 ML/MIN/1.73SQ M
GFR SERPL CREATININE-BSD FRML MDRD: 121 ML/MIN/1.73SQ M
GLUCOSE P FAST SERPL-MCNC: 108 MG/DL (ref 65–99)
GLUCOSE SERPL-MCNC: 108 MG/DL (ref 65–140)
GLUCOSE SERPL-MCNC: 113 MG/DL (ref 65–140)
HCT VFR BLD AUTO: 40.1 % (ref 36.5–49.3)
HCT VFR BLD AUTO: 45.4 % (ref 36.5–49.3)
HGB BLD-MCNC: 13.1 G/DL (ref 12–17)
HGB BLD-MCNC: 14.6 G/DL (ref 12–17)
IMM GRANULOCYTES # BLD AUTO: 0.09 THOUSAND/UL (ref 0–0.2)
IMM GRANULOCYTES # BLD AUTO: 0.11 THOUSAND/UL (ref 0–0.2)
IMM GRANULOCYTES NFR BLD AUTO: 1 % (ref 0–2)
IMM GRANULOCYTES NFR BLD AUTO: 1 % (ref 0–2)
INTERVENTRICULAR SEPTUM IN DIASTOLE (PARASTERNAL SHORT AXIS VIEW): 1.2 CM
INTERVENTRICULAR SEPTUM: 1.2 CM (ref 0.6–1.1)
LAAS-AP2: 29.2 CM2
LAAS-AP4: 26.6 CM2
LEFT ATRIUM AREA SYSTOLE SINGLE PLANE A4C: 24.5 CM2
LEFT ATRIUM SIZE: 3.9 CM
LEFT ATRIUM VOLUME (MOD BIPLANE): 95 ML
LEFT ATRIUM VOLUME INDEX (MOD BIPLANE): 32.4 ML/M2
LEFT INTERNAL DIMENSION IN SYSTOLE: 3.6 CM (ref 2.1–4)
LEFT VENTRICULAR INTERNAL DIMENSION IN DIASTOLE: 5.6 CM (ref 3.5–6)
LEFT VENTRICULAR POSTERIOR WALL IN END DIASTOLE: 1.2 CM
LEFT VENTRICULAR STROKE VOLUME: 100 ML
LVSV (TEICH): 100 ML
LYMPHOCYTES # BLD AUTO: 3.12 THOUSANDS/ÂΜL (ref 0.6–4.47)
LYMPHOCYTES # BLD AUTO: 3.72 THOUSANDS/ÂΜL (ref 0.6–4.47)
LYMPHOCYTES NFR BLD AUTO: 26 % (ref 14–44)
LYMPHOCYTES NFR BLD AUTO: 26 % (ref 14–44)
MAGNESIUM SERPL-MCNC: 1.9 MG/DL (ref 1.9–2.7)
MAGNESIUM SERPL-MCNC: 1.9 MG/DL (ref 1.9–2.7)
MCH RBC QN AUTO: 27.2 PG (ref 26.8–34.3)
MCH RBC QN AUTO: 27.5 PG (ref 26.8–34.3)
MCHC RBC AUTO-ENTMCNC: 32.2 G/DL (ref 31.4–37.4)
MCHC RBC AUTO-ENTMCNC: 32.7 G/DL (ref 31.4–37.4)
MCV RBC AUTO: 84 FL (ref 82–98)
MCV RBC AUTO: 85 FL (ref 82–98)
MONOCYTES # BLD AUTO: 0.85 THOUSAND/ÂΜL (ref 0.17–1.22)
MONOCYTES # BLD AUTO: 0.97 THOUSAND/ÂΜL (ref 0.17–1.22)
MONOCYTES NFR BLD AUTO: 7 % (ref 4–12)
MONOCYTES NFR BLD AUTO: 7 % (ref 4–12)
MV E'TISSUE VEL-SEP: 11 CM/S
MV PEAK A VEL: 0.71 M/S
MV PEAK E VEL: 119 CM/S
MV STENOSIS PRESSURE HALF TIME: 75 MS
MV VALVE AREA P 1/2 METHOD: 2.93
NEUTROPHILS # BLD AUTO: 7.37 THOUSANDS/ÂΜL (ref 1.85–7.62)
NEUTROPHILS # BLD AUTO: 8.84 THOUSANDS/ÂΜL (ref 1.85–7.62)
NEUTS SEG NFR BLD AUTO: 63 % (ref 43–75)
NEUTS SEG NFR BLD AUTO: 63 % (ref 43–75)
NRBC BLD AUTO-RTO: 0 /100 WBCS
NRBC BLD AUTO-RTO: 0 /100 WBCS
P AXIS: 37 DEGREES
PLATELET # BLD AUTO: 191 THOUSANDS/UL (ref 149–390)
PLATELET # BLD AUTO: 285 THOUSANDS/UL (ref 149–390)
PMV BLD AUTO: 10 FL (ref 8.9–12.7)
PMV BLD AUTO: 10.4 FL (ref 8.9–12.7)
POTASSIUM SERPL-SCNC: 3.7 MMOL/L (ref 3.5–5.3)
POTASSIUM SERPL-SCNC: 4 MMOL/L (ref 3.5–5.3)
PR INTERVAL: 206 MS
PROT SERPL-MCNC: 7.3 G/DL (ref 6.4–8.4)
QRS AXIS: 46 DEGREES
QRSD INTERVAL: 80 MS
QT INTERVAL: 338 MS
QTC INTERVAL: 387 MS
RBC # BLD AUTO: 4.77 MILLION/UL (ref 3.88–5.62)
RBC # BLD AUTO: 5.36 MILLION/UL (ref 3.88–5.62)
RIGHT ATRIUM AREA SYSTOLE A4C: 24.4 CM2
RIGHT VENTRICLE ID DIMENSION: 4.1 CM
SL CV LEFT ATRIUM LENGTH A2C: 6.6 CM
SL CV LV EF: 60
SL CV PED ECHO LEFT VENTRICLE DIASTOLIC VOLUME (MOD BIPLANE) 2D: 155 ML
SL CV PED ECHO LEFT VENTRICLE SYSTOLIC VOLUME (MOD BIPLANE) 2D: 55 ML
SODIUM SERPL-SCNC: 138 MMOL/L (ref 135–147)
SODIUM SERPL-SCNC: 138 MMOL/L (ref 135–147)
T WAVE AXIS: 32 DEGREES
TR MAX PG: 23 MMHG
TR PEAK VELOCITY: 2.4 M/S
TRICUSPID ANNULAR PLANE SYSTOLIC EXCURSION: 2.9 CM
TRICUSPID VALVE PEAK REGURGITATION VELOCITY: 2.39 M/S
TSH SERPL DL<=0.05 MIU/L-ACNC: 2.13 UIU/ML (ref 0.45–4.5)
VENTRICULAR RATE: 79 BPM
WBC # BLD AUTO: 11.84 THOUSAND/UL (ref 4.31–10.16)
WBC # BLD AUTO: 14.1 THOUSAND/UL (ref 4.31–10.16)

## 2024-02-09 PROCEDURE — 83735 ASSAY OF MAGNESIUM: CPT | Performed by: EMERGENCY MEDICINE

## 2024-02-09 PROCEDURE — 93306 TTE W/DOPPLER COMPLETE: CPT | Performed by: INTERNAL MEDICINE

## 2024-02-09 PROCEDURE — 36415 COLL VENOUS BLD VENIPUNCTURE: CPT | Performed by: EMERGENCY MEDICINE

## 2024-02-09 PROCEDURE — 94760 N-INVAS EAR/PLS OXIMETRY 1: CPT

## 2024-02-09 PROCEDURE — 80048 BASIC METABOLIC PNL TOTAL CA: CPT | Performed by: PHYSICIAN ASSISTANT

## 2024-02-09 PROCEDURE — 85025 COMPLETE CBC W/AUTO DIFF WBC: CPT | Performed by: PHYSICIAN ASSISTANT

## 2024-02-09 PROCEDURE — 83880 ASSAY OF NATRIURETIC PEPTIDE: CPT | Performed by: PHYSICIAN ASSISTANT

## 2024-02-09 PROCEDURE — 84443 ASSAY THYROID STIM HORMONE: CPT | Performed by: EMERGENCY MEDICINE

## 2024-02-09 PROCEDURE — 99236 HOSP IP/OBS SAME DATE HI 85: CPT | Performed by: FAMILY MEDICINE

## 2024-02-09 PROCEDURE — 84484 ASSAY OF TROPONIN QUANT: CPT | Performed by: EMERGENCY MEDICINE

## 2024-02-09 PROCEDURE — 99204 OFFICE O/P NEW MOD 45 MIN: CPT | Performed by: INTERNAL MEDICINE

## 2024-02-09 PROCEDURE — 94660 CPAP INITIATION&MGMT: CPT

## 2024-02-09 PROCEDURE — 93306 TTE W/DOPPLER COMPLETE: CPT

## 2024-02-09 PROCEDURE — 99285 EMERGENCY DEPT VISIT HI MDM: CPT | Performed by: EMERGENCY MEDICINE

## 2024-02-09 PROCEDURE — NC001 PR NO CHARGE: Performed by: FAMILY MEDICINE

## 2024-02-09 PROCEDURE — 83735 ASSAY OF MAGNESIUM: CPT | Performed by: PHYSICIAN ASSISTANT

## 2024-02-09 PROCEDURE — 71045 X-RAY EXAM CHEST 1 VIEW: CPT

## 2024-02-09 PROCEDURE — 99222 1ST HOSP IP/OBS MODERATE 55: CPT | Performed by: PHYSICIAN ASSISTANT

## 2024-02-09 PROCEDURE — 85025 COMPLETE CBC W/AUTO DIFF WBC: CPT | Performed by: EMERGENCY MEDICINE

## 2024-02-09 PROCEDURE — 96361 HYDRATE IV INFUSION ADD-ON: CPT

## 2024-02-09 PROCEDURE — 96374 THER/PROPH/DIAG INJ IV PUSH: CPT

## 2024-02-09 PROCEDURE — 80053 COMPREHEN METABOLIC PANEL: CPT | Performed by: EMERGENCY MEDICINE

## 2024-02-09 RX ORDER — DILTIAZEM HYDROCHLORIDE 240 MG/1
240 CAPSULE, COATED, EXTENDED RELEASE ORAL DAILY
Status: DISCONTINUED | OUTPATIENT
Start: 2024-02-09 | End: 2024-02-09 | Stop reason: HOSPADM

## 2024-02-09 RX ORDER — ENOXAPARIN SODIUM 100 MG/ML
60 INJECTION SUBCUTANEOUS 2 TIMES DAILY
Status: DISCONTINUED | OUTPATIENT
Start: 2024-02-09 | End: 2024-02-09 | Stop reason: HOSPADM

## 2024-02-09 RX ORDER — ACETAMINOPHEN 325 MG/1
650 TABLET ORAL EVERY 6 HOURS PRN
Status: DISCONTINUED | OUTPATIENT
Start: 2024-02-09 | End: 2024-02-09 | Stop reason: HOSPADM

## 2024-02-09 RX ORDER — DILTIAZEM HYDROCHLORIDE 5 MG/ML
0.25 INJECTION INTRAVENOUS ONCE
Status: COMPLETED | OUTPATIENT
Start: 2024-02-09 | End: 2024-02-09

## 2024-02-09 RX ORDER — PANTOPRAZOLE SODIUM 40 MG/1
40 TABLET, DELAYED RELEASE ORAL
Status: DISCONTINUED | OUTPATIENT
Start: 2024-02-09 | End: 2024-02-09 | Stop reason: HOSPADM

## 2024-02-09 RX ORDER — POTASSIUM CHLORIDE 20 MEQ/1
40 TABLET, EXTENDED RELEASE ORAL ONCE
Status: COMPLETED | OUTPATIENT
Start: 2024-02-09 | End: 2024-02-09

## 2024-02-09 RX ORDER — FLUTICASONE FUROATE AND VILANTEROL 100; 25 UG/1; UG/1
1 POWDER RESPIRATORY (INHALATION)
Status: DISCONTINUED | OUTPATIENT
Start: 2024-02-09 | End: 2024-02-09 | Stop reason: HOSPADM

## 2024-02-09 RX ORDER — ALBUTEROL SULFATE 90 UG/1
2 AEROSOL, METERED RESPIRATORY (INHALATION) EVERY 6 HOURS PRN
Status: DISCONTINUED | OUTPATIENT
Start: 2024-02-09 | End: 2024-02-09 | Stop reason: HOSPADM

## 2024-02-09 RX ORDER — DILTIAZEM HYDROCHLORIDE 120 MG/1
240 CAPSULE, EXTENDED RELEASE ORAL DAILY
Qty: 60 CAPSULE | Refills: 0 | Status: SHIPPED | OUTPATIENT
Start: 2024-02-09 | End: 2024-03-10

## 2024-02-09 RX ORDER — MAGNESIUM SULFATE 1 G/100ML
1 INJECTION INTRAVENOUS ONCE
Status: COMPLETED | OUTPATIENT
Start: 2024-02-09 | End: 2024-02-09

## 2024-02-09 RX ORDER — MONTELUKAST SODIUM 10 MG/1
10 TABLET ORAL
Status: DISCONTINUED | OUTPATIENT
Start: 2024-02-09 | End: 2024-02-09 | Stop reason: HOSPADM

## 2024-02-09 RX ADMIN — DILTIAZEM HYDROCHLORIDE 50 MG: 5 INJECTION INTRAVENOUS at 00:09

## 2024-02-09 RX ADMIN — PERFLUTREN 0.6 ML/MIN: 6.52 INJECTION, SUSPENSION INTRAVENOUS at 12:16

## 2024-02-09 RX ADMIN — FLUTICASONE FUROATE AND VILANTEROL TRIFENATATE 1 PUFF: 100; 25 POWDER RESPIRATORY (INHALATION) at 11:26

## 2024-02-09 RX ADMIN — DILTIAZEM HYDROCHLORIDE 240 MG: 120 CAPSULE, COATED, EXTENDED RELEASE ORAL at 09:05

## 2024-02-09 RX ADMIN — MAGNESIUM SULFATE HEPTAHYDRATE 1 G: 1 INJECTION, SOLUTION INTRAVENOUS at 03:27

## 2024-02-09 RX ADMIN — PANTOPRAZOLE SODIUM 40 MG: 40 TABLET, DELAYED RELEASE ORAL at 09:05

## 2024-02-09 RX ADMIN — SODIUM CHLORIDE 1000 ML: 0.9 INJECTION, SOLUTION INTRAVENOUS at 00:05

## 2024-02-09 RX ADMIN — POTASSIUM CHLORIDE 40 MEQ: 1500 TABLET, EXTENDED RELEASE ORAL at 03:28

## 2024-02-09 NOTE — ASSESSMENT & PLAN NOTE
WBC 14.10k on admission   Appears to be a chronic issue for the patient, does typically have leukocytosis with bouts of cellulitis   No clear infectious source, patient denies any symptoms   Monitor off abx   Trend CBC

## 2024-02-09 NOTE — DISCHARGE SUMMARY
"[facility]  Discharge- Hans Carreno 1985, 38 y.o. male MRN: [id number]  Unit/Bed#: -01 Encounter: [id number]  Primary Care Provider: Dawson Nickerson MD   Date and time admitted to hospital: 2/8/2024 11:52 PM    * SVT (supraventricular tachycardia)  Assessment & Plan  Now resolved  Discussed with cardiology- who did the echo and was reviewed- and was reported as wnl and was cleared for DC with no medication changes  Pts cardiology team offered ablation but pt wants to think about and will discuss with them as OP. No event monitor plans at DC  Pt cleared for Dc in a stable condition    Morbid obesity (HCC)  Assessment & Plan  BMI 63.58   Encourage weight loss/lifestyle modification  Recommend incorporating a more whole foods plant-predominant diet along with decreasing consumption of red meats and processed foods  Per AHA guidelines, recommend moderate-vigorous intensity exercise for 30 minutes a day for 5 days a week or a total of 150 min/week  Counseled on lifestyle changes for >3 min      Hypertension  Assessment & Plan  /87 (BP Location: Right arm)   Pulse 70   Temp 98.1 °F (36.7 °C) (Oral)   Resp 18   Ht 5' 10\" (1.778 m)   Wt (!) 201 kg (443 lb)   SpO2 97%   BMI 63.56 kg/m²   Stable pressures  Continue Cardizem     Obstructive sleep apnea syndrome  Assessment & Plan  Encourage compliance with CPAP qhs     Leukocytosis  Assessment & Plan  WBC 14.10k on admission   Appears to be a chronic issue for the patient, does typically have leukocytosis with bouts of cellulitis   No clear infectious source, patient denies any symptoms   Monitor off abx   Trend CBC     Asthma  Assessment & Plan  Not in acute exacerbation   Continue inhalers        Medical Problems       Resolved Problems  Date Reviewed: 2/9/2024   None       Discharging Physician / Practitioner: Scott Day MD  PCP: Dawson Nickerson MD  Admission Date:   Admission Orders (From admission, onward)       Ordered     " "   02/09/24 0105  Place in Observation  Once                          Discharge Date: 02/09/24    Consultations During Hospital Stay:  IP CONSULT TO CARDIOLOGY    Procedures Performed:   echo    Significant Findings / Test Results:   Echo wnl as reported to me by cardiology    Incidental Findings:   none     Test Results Pending at Discharge (will require follow up):   none     Outpatient Tests Requested:  none    Complications:  none    Reason for Admission: SVT    Hospital Course:   Hans Carreno is a 38 y.o. male patient who originally presented to the hospital on 2/8/2024 due to palpitations since 2 days.  Patient was found in SVT in the emergency room and was given IV Cardizem bolus 50 mg.  Patient has been in sinus rhythm since then and is well-controlled.  Cardiology aware of short run of SVT during hospitalization.  Cardiology offered patient ablation but patient has refused at this time and wants to think about it.  Patient electrolytes are stable.  Patient's blood pressure is also stable.  Cardiology has cleared the patient for discharge after reviewing the echocardiogram.  No medication changes made at this time.  Should follow-up outpatient with cardiology    Please see above list of diagnoses and related plan for additional information.     Condition at Discharge: stable    Discharge Day Visit / Exam:   Subjective:  \" I feel much better now\"  Vitals: Blood Pressure: 169/87 (02/09/24 1258)  Pulse: 70 (02/09/24 1258)  Temperature: 98.1 °F (36.7 °C) (02/09/24 0034)  Temp Source: Oral (02/09/24 0034)  Respirations: 18 (02/09/24 1258)  Height: 5' 10\" (177.8 cm) (02/09/24 1138)  Weight - Scale: (!) 201 kg (443 lb) (02/09/24 1138)  SpO2: 97 % (02/09/24 1258)  Exam:   Physical Exam General- Awake, alert and oriented x 3, looks comfortable  HEENT- Normocephalic, atraumatic, oral mucosa- moist, morbidly obese  Neck- Supple, No carotid bruit, no JVD  CVS- Normal S1/ S2, Regular rate and rhythm, No murmur, " bilateral lymphedema changes respiratory system- B/L clear breath sounds, no wheezing  Abdomen- Soft, Non distended, no tenderness, Bowel sound- present 4 quads  Genitourinary- No suprapubic tenderness, No CVA tenderness  Skin-lymphedematous skin changes visible  Musculoskeletal- No gross deformity  Psych- No acute psychosis  CNS- CN II- XII grossly intact, No acute focal neurologic deficit noted      Discussion with Family: Patient declined call to .     Discharge instructions/Information to patient and family:   See after visit summary for information provided to patient and family.      Provisions for Follow-Up Care:  See after visit summary for information related to follow-up care and any pertinent home health orders.      Mobility at time of Discharge:      HLM Goal achieved. Continue to encourage appropriate mobility.     Disposition:   Home    Planned Readmission: no     Discharge Statement:  I spent 55 minutes discharging the patient. This time was spent on the day of discharge. I had direct contact with the patient on the day of discharge. Greater than 50% of the total time was spent examining patient, answering all patient questions, arranging and discussing plan of care with patient as well as directly providing post-discharge instructions.  Additional time then spent on discharge activities.    Discharge Medications:  See after visit summary for reconciled discharge medications provided to patient and/or family.      **Please Note: This note may have been constructed using a voice recognition system**

## 2024-02-09 NOTE — ASSESSMENT & PLAN NOTE
BMI 63.58   Encourage weight loss/lifestyle modification  Recommend incorporating a more whole foods plant-predominant diet along with decreasing consumption of red meats and processed foods  Per AHA guidelines, recommend moderate-vigorous intensity exercise for 30 minutes a day for 5 days a week or a total of 150 min/week  Counseled on lifestyle changes for >3 min

## 2024-02-09 NOTE — ASSESSMENT & PLAN NOTE
"/87 (BP Location: Right arm)   Pulse 70   Temp 98.1 °F (36.7 °C) (Oral)   Resp 18   Ht 5' 10\" (1.778 m)   Wt (!) 201 kg (443 lb)   SpO2 97%   BMI 63.56 kg/m²   Stable pressures  Continue Cardizem   "

## 2024-02-09 NOTE — DISCHARGE INSTR - AVS FIRST PAGE
Dear Hans Carreno,     It was our pleasure to care for you here at [facility].  It is our hope that we were always able to exceed the expected standards for your care during your stay.  You were hospitalized due to SVT. You were treated and now you are back in sinus rhythm. Cardiology offered ablation and you have endorsed understanding and want to think about it. ECHO was reported to be normal.    You were cared for on the ER floor by Scott Day MD with the [facility] who covers for your primary care physician (PCP), Dawson Nickerson MD, while you were hospitalized.  If you have any questions or concerns related to this hospitalization, you may contact us at .  For follow up as well as any medication refills, we recommend that you follow up with your primary care physician.  A registered nurse will reach out to you by phone within a few days after your discharge to answer any additional questions that you may have after going home.  However, at this time we provide for you here, the most important instructions / recommendations at discharge:     Notable Medication Adjustments -   No medication adjustments  Testing Required after Discharge -   none  Important follow up information -   Cardiology in 2 weeks. Let cardiology know your decision on ablation  Other Instructions -   Eat a heart healthy diet  Please review this entire after visit summary as additional general instructions including medication list, appointments, activity, diet, any pertinent wound care, and other additional recommendations from your care team that may be provided for you.      Sincerely,     Scott Day MD

## 2024-02-09 NOTE — ASSESSMENT & PLAN NOTE
Now resolved  Discussed with cardiology- who did the echo and was reviewed- and was reported as wnl and was cleared for DC with no medication changes  Pts cardiology team offered ablation but pt wants to think about and will discuss with them as OP. No event monitor plans at DC  Pt cleared for Dc in a stable condition

## 2024-02-09 NOTE — H&P
[facility]  H&P  Name: Hans Carreno 38 y.o. male I MRN: [id number]  Unit/Bed#: ED 29 I Date of Admission: 2/8/2024   Date of Service: 2/9/2024 I Hospital Day: 0      Assessment/Plan   * SVT (supraventricular tachycardia)  Assessment & Plan  Patient with hx of SVT presents with palpitations x 2 days.   Noted to have SVT vs. Afib in the ED which resolved after IV Cardizem bolus   Patient is on Cartia XT 240mg daily however reports that he has been taking this medication every other day so that he doesn't run out of his medication before his new PCP appointment at the end of the month   Currently patient is in NSR HR 70s  Resume Cardizem 240mg daily   Obtain Echo   Keep K>4, Mag >2 -- replete lytes   Monitor telemetry   Cardiology consult appreciated    Leukocytosis  Assessment & Plan  WBC 14.10k on admission   Appears to be a chronic issue for the patient, does typically have leukocytosis with bouts of cellulitis   No clear infectious source, patient denies any symptoms   Monitor off abx   Trend CBC     Morbid obesity (HCC)  Assessment & Plan  BMI 63.58   Encourage weight loss/lifestyle modification    Hypertension  Assessment & Plan  BP acceptable   Continue Cardizem     Obstructive sleep apnea syndrome  Assessment & Plan  CPAP qhs     GERD (gastroesophageal reflux disease)  Assessment & Plan  Continue PPI    Asthma  Assessment & Plan  Not in acute exacerbation   Continue inhalers           VTE Pharmacologic Prophylaxis: VTE Score: 3 Moderate Risk (Score 3-4) - Pharmacological DVT Prophylaxis Ordered: enoxaparin (Lovenox).  Code Status: Level 1 - Full Code   Discussion with family: Patient declined call to .     Anticipated Length of Stay: Patient will be admitted on an observation basis with an anticipated length of stay of less than 2 midnights secondary to SVT .    Total Time Spent on Date of Encounter in care of patient: 60 mins. This time was spent on one or more of the  following: performing physical exam; counseling and coordination of care; obtaining or reviewing history; documenting in the medical record; reviewing/ordering tests, medications or procedures; communicating with other healthcare professionals and discussing with patient's family/caregivers.    Chief Complaint: palpitations     History of Present Illness:  Hans Carreno is a 38 y.o. male with a PMH of SVT, TALIA on CPAP, HTN, asthma who presents with palpitations x2 days. Patient has a known history of SVT.  He reports that he has an appointment with a new PCP at the end of this month and has been taking his Cardizem every other day instead of daily so that he does not run out of his medication before his appointment.  In the ED, patient was noted to have SVT versus A-fib which resolved after IV Cardizem bolus.  Patient is currently in normal sinus rhythm heart rate 70 to 80s.  Patient denies any infectious symptoms.  Denies any chest pain or shortness of breath.  Reports to be feeling well at this time after Cardizem bolus.  Patient was recently discharged from the hospital on 11/05/2023 for left lower cellulitis.  Patient does have some chronic hyperpigmentation of the bilateral lower extremities, however the patient reports that it looks better than it normally does and he denies any tenderness to palpation.  He denies any fever/chills at home.    Review of Systems:  Review of Systems   Constitutional:  Negative for chills and fever.   HENT:  Negative for congestion.    Respiratory:  Negative for cough, shortness of breath and wheezing.    Cardiovascular:  Positive for palpitations. Negative for chest pain and leg swelling.   Gastrointestinal:  Negative for abdominal pain, constipation, diarrhea and vomiting.   Genitourinary:  Negative for dysuria.   Neurological:  Negative for weakness.   Psychiatric/Behavioral:  Negative for confusion.         Past Medical and Surgical History:   Past Medical History:   Diagnosis  Date    Asthma     CPAP (continuous positive airway pressure) dependence     Pericardial effusion     Sleep apnea     SVT (supraventricular tachycardia)        Past Surgical History:   Procedure Laterality Date    CARDIAC SURGERY         Meds/Allergies:  Prior to Admission medications    Medication Sig Start Date End Date Taking? Authorizing Provider   Advair Diskus 250-50 MCG/DOSE inhaler Inhale 1 puff daily 8/5/21   Historical Provider, MD   albuterol (PROVENTIL HFA,VENTOLIN HFA) 90 mcg/act inhaler Inhale 2 puffs every 6 (six) hours as needed for wheezing    Historical Provider, MD   Cartia  MG 24 hr capsule  5/19/22   Historical Provider, MD   montelukast (SINGULAIR) 10 mg tablet Take 10 mg by mouth daily at bedtime    Historical Provider, MD   omeprazole (PriLOSEC) 20 mg delayed release capsule Take 20 mg by mouth daily    Historical Provider, MD BOJORQUEZ have reviewed home medications with patient personally.    Allergies: No Known Allergies    Social History:  Marital Status: /Civil Union   Patient Pre-hospital Living Situation: Home  Patient Pre-hospital Level of Mobility: walks  Patient Pre-hospital Diet Restrictions: none   Substance Use History:   Social History     Substance and Sexual Activity   Alcohol Use Yes    Comment: rarely     Social History     Tobacco Use   Smoking Status Never   Smokeless Tobacco Never     Social History     Substance and Sexual Activity   Drug Use Never       Family History:  Family History   Problem Relation Age of Onset    Diabetes Mother     Heart failure Mother     Hypertension Father        Physical Exam:     Vitals:   Blood Pressure: 139/82 (02/09/24 0045)  Pulse: 75 (02/09/24 0045)  Temperature: 98.1 °F (36.7 °C) (02/09/24 0034)  Temp Source: Oral (02/09/24 0034)  Respirations: 18 (02/09/24 0045)  SpO2: 93 % (02/09/24 0045)    Physical Exam  Constitutional:       General: He is not in acute distress.     Appearance: He is obese.   HENT:      Mouth/Throat:       Mouth: Mucous membranes are moist.   Eyes:      General: No scleral icterus.  Cardiovascular:      Rate and Rhythm: Normal rate and regular rhythm.      Heart sounds: Normal heart sounds.   Pulmonary:      Breath sounds: Normal breath sounds.   Abdominal:      General: Abdomen is flat. Bowel sounds are normal.      Palpations: Abdomen is soft.      Tenderness: There is no abdominal tenderness.   Musculoskeletal:      Right lower leg: No edema.      Left lower leg: No edema.   Skin:     Comments: Chronic hyperpigmentation bilateral LE, no TTP   Neurological:      General: No focal deficit present.      Mental Status: He is alert and oriented to person, place, and time.   Psychiatric:         Mood and Affect: Mood normal.          Additional Data:     Lab Results:  Results from last 7 days   Lab Units 02/09/24  0006   WBC Thousand/uL 14.10*   HEMOGLOBIN g/dL 14.6   HEMATOCRIT % 45.4   PLATELETS Thousands/uL 285   NEUTROS PCT % 63   LYMPHS PCT % 26   MONOS PCT % 7   EOS PCT % 3     Results from last 7 days   Lab Units 02/09/24  0006   SODIUM mmol/L 138   POTASSIUM mmol/L 3.7   CHLORIDE mmol/L 104   CO2 mmol/L 27   BUN mg/dL 18   CREATININE mg/dL 0.92   ANION GAP mmol/L 7   CALCIUM mg/dL 9.1   ALBUMIN g/dL 3.7   TOTAL BILIRUBIN mg/dL 0.45   ALK PHOS U/L 102   ALT U/L 17   AST U/L 14   GLUCOSE RANDOM mg/dL 113         Results from last 7 days   Lab Units 02/08/24  2356   POC GLUCOSE mg/dl 99               Lines/Drains:  Invasive Devices       Peripheral Intravenous Line  Duration             Peripheral IV 02/09/24 Left Antecubital <1 day    Peripheral IV 02/09/24 Left;Dorsal (posterior) Hand <1 day                        Imaging: Personally reviewed the following imaging: chest xray  XR chest portable   ED Interpretation by Lisa Martínez DO (02/09 0034)   NAD          EKG and Other Studies Reviewed on Admission:   EKG:  SVT vs afib.    ** Please Note: This note has been constructed using a voice recognition  system. **

## 2024-02-09 NOTE — ED PROVIDER NOTES
History  Chief Complaint   Patient presents with    Palpitations     Pt arrived ambulatory with c/o palpitations for 2 days. Pt has hx of SVT     Patient is a 38-year-old male past medical history of SVT on Cardizem, asthma, hypertension, TALIA presenting palpitations.  Patient notes intermittent palpitations for last 2 days and notes a throbbing in his throat when he has them which is nonradiating.  Does note mild shortness of breath currently but denies any lightheadedness, nausea or vomiting, increased from baseline leg swelling, cough or fevers.  Has been compliant with his medication but states that he has recently been cutting his Cardizem in half in an attempt to make it stretch as he has a new PCP and has not been able to see them as of yet.  He does state that he took a double dose yesterday with no relief.  Denies any other new medications, changes in diet.        Prior to Admission Medications   Prescriptions Last Dose Informant Patient Reported? Taking?   Advair Diskus 250-50 MCG/DOSE inhaler  Self Yes No   Sig: Inhale 1 puff daily   Cartia  MG 24 hr capsule  Self Yes No   albuterol (PROVENTIL HFA,VENTOLIN HFA) 90 mcg/act inhaler  Self Yes No   Sig: Inhale 2 puffs every 6 (six) hours as needed for wheezing   montelukast (SINGULAIR) 10 mg tablet  Self Yes No   Sig: Take 10 mg by mouth daily at bedtime   omeprazole (PriLOSEC) 20 mg delayed release capsule  Self Yes No   Sig: Take 20 mg by mouth daily      Facility-Administered Medications: None       Past Medical History:   Diagnosis Date    Asthma     CPAP (continuous positive airway pressure) dependence     Pericardial effusion     Sleep apnea     SVT (supraventricular tachycardia)        Past Surgical History:   Procedure Laterality Date    CARDIAC SURGERY         Family History   Problem Relation Age of Onset    Diabetes Mother     Heart failure Mother     Hypertension Father      I have reviewed and agree with the history as  documented.    E-Cigarette/Vaping    E-Cigarette Use Never User      E-Cigarette/Vaping Substances    Nicotine No     THC No     CBD No     Flavoring No     Other No     Unknown No      Social History     Tobacco Use    Smoking status: Never    Smokeless tobacco: Never   Vaping Use    Vaping status: Never Used   Substance Use Topics    Alcohol use: Yes     Comment: rarely    Drug use: Never       Review of Systems   All other systems reviewed and are negative.      Physical Exam  Physical Exam  Vitals reviewed.   Constitutional:       General: He is not in acute distress.     Appearance: Normal appearance. He is not ill-appearing.   HENT:      Mouth/Throat:      Mouth: Mucous membranes are moist.   Eyes:      Conjunctiva/sclera: Conjunctivae normal.   Cardiovascular:      Rate and Rhythm: Regular rhythm. Tachycardia present.      Pulses: Normal pulses.      Heart sounds: Normal heart sounds.   Pulmonary:      Effort: Pulmonary effort is normal.      Breath sounds: Normal breath sounds.   Abdominal:      General: Abdomen is flat.      Palpations: Abdomen is soft.      Tenderness: There is no abdominal tenderness.   Musculoskeletal:         General: No swelling or tenderness. Normal range of motion.      Cervical back: Neck supple.      Right lower leg: Edema present.      Left lower leg: Edema present.   Skin:     General: Skin is warm and dry.   Neurological:      General: No focal deficit present.      Mental Status: He is alert.   Psychiatric:         Mood and Affect: Mood normal.         Vital Signs  ED Triage Vitals [02/08/24 2354]   Temp Pulse Respirations Blood Pressure SpO2   -- 80 21 (!) 139/102 98 %      Temp src Heart Rate Source Patient Position - Orthostatic VS BP Location FiO2 (%)   -- Monitor Lying Left arm --      Pain Score       --           Vitals:    02/08/24 2354 02/09/24 0000   BP: (!) 139/102 (!) 139/102   Pulse: 80 76   Patient Position - Orthostatic VS: Lying Lying         Visual  Acuity      ED Medications  Medications   sodium chloride 0.9 % bolus 1,000 mL (has no administration in time range)   diltiazem (CARDIZEM) injection 50 mg (has no administration in time range)       Diagnostic Studies  Results Reviewed       Procedure Component Value Units Date/Time    Comprehensive metabolic panel [[id number]]     Lab Status: No result Specimen: Blood     CBC and differential [[id number]]     Lab Status: No result Specimen: Blood     Magnesium [[id number]]     Lab Status: No result Specimen: Blood     TSH, 3rd generation with Free T4 reflex [[id number]]     Lab Status: No result Specimen: Blood     HS Troponin 0hr (reflex protocol) [[id number]]     Lab Status: No result Specimen: Blood     Fingerstick Glucose (POCT) [[id number]]  (Normal) Collected: 02/08/24 2356    Lab Status: Final result Updated: 02/08/24 2357     POC Glucose 99 mg/dl                    XR chest 2 views    (Results Pending)              Procedures  ECG 12 Lead Documentation Only    Date/Time: 2/9/2024 12:07 AM    Performed by: Lisa Martínez DO  Authorized by: Lisa Martínez DO    Patient location:  ED  Previous ECG:     Previous ECG:  Compared to current    Comparison ECG info:  Atrial fibrillation versus SVT with newlateral T wave inversions  Interpretation:     Interpretation: abnormal    Rate:     ECG rate assessment: tachycardic    Rhythm:     Rhythm: atrial fibrillation and SVT    QRS:     QRS axis:  Normal    QRS intervals:  Normal  Conduction:     Conduction: normal    ST segments:     ST segments:  Normal  T waves:     T waves: inverted      Inverted:  V4, V5 and V6           ED Course                               SBIRT 22yo+      Flowsheet Row Most Recent Value   Initial Alcohol Screen: US AUDIT-C     1. How often do you have a drink containing alcohol? 0 Filed at: 02/08/2024 2354   2. How many drinks containing alcohol do you have on a typical day you are drinking?  0 Filed at: 02/08/2024 2354   3a. Male UNDER  65: How often do you have five or more drinks on one occasion? 0 Filed at: 02/08/2024 2357   Audit-C Score 0 Filed at: 02/08/2024 2352   [place]: How many times in the past year have you...    Used an illegal drug or used a prescription medication for non-medical reasons? Never Filed at: 02/08/2024 2357                      Medical Decision Making  Patient is a 38-year-old male past medical history of asthma, SVT, hypertension, TALIA presenting with palpitations.  Patient is well-appearing at bedside with stable vitals though intermittently with tachycardia in the 150s, EKG shows A-fib versus SVT, difficult to interpret, and as he is going in and out of tachycardia rapidly likely will not benefit from adenosine, will give IV Cardizem, obtain labs to assess for electrode MIs, anemia, ACS, chest x-ray to assess for pneumonia or pneumothorax, give fluids and continue to monitor.  Bedside ultrasound does not appear to show any pericardial effusion.    Amount and/or Complexity of Data Reviewed  Labs: ordered.  Radiology: ordered.    Risk  Prescription drug management.             Disposition  Final diagnoses:   None     ED Disposition       None          Follow-up Information    None         Patient's Medications   Discharge Prescriptions    No medications on file       No discharge procedures on file.    PDMP Review       None            ED Provider  Electronically Signed by             Lisa Martínez DO  02/09/24 0459

## 2024-02-09 NOTE — RESPIRATORY THERAPY NOTE
02/09/24 0339   Respiratory Assessment   Assessment Type Assess only   General Appearance Alert;Awake   Respiratory Pattern Normal   Chest Assessment Chest expansion symmetrical   Bilateral Breath Sounds Diminished   Cough None   Resp Comments Pt placed on cpap at this time   O2 Device V60   Non-Invasive Information   O2 Interface Device Face mask   Non-Invasive Ventilation Mode CPAP   $ Intermittent NIV Yes   SpO2 94 %   $ Pulse Oximetry Spot Check Charge Completed   Non-Invasive Settings   FiO2 (%) 21   PEEP/CPAP (cm H2O) 10   Rise Time 2   Non-Invasive Readings   Skin Intervention Skin intact   Total Rate 17   Spontaneous Vt (mL) 558   Spontaneous MV (mL) 10.2   Leak (lpm) 14   Non-Invasive Alarms   Insp Pressure High (cm H20) 25   Insp Pressure Low (cm H20) 8   Low Insp Pressure Time (sec) 20 sec   MV Low (L/min) 3   Vt High (mL) 1200   Vt Low (mL) 200   High Resp Rate (BPM) 40 BPM   Low Resp Rate (BPM) 8 BPM   Apnea Interval (sec) 30     RT Ventilator Management Note  Hans Carreno 38 y.o. male MRN: [id number]  Unit/Bed#: ED 29 Encounter: [id number]      Daily Screen    No data found in the last 10 encounters.           Physical Exam:   Assessment Type: Assess only  General Appearance: Alert, Awake  Respiratory Pattern: Normal  Chest Assessment: Chest expansion symmetrical  Bilateral Breath Sounds: Diminished  Cough: None  O2 Device: V60      Resp Comments: Pt placed on cpap at this time

## 2024-02-09 NOTE — ASSESSMENT & PLAN NOTE
Patient with hx of SVT presents with palpitations x 2 days.   Noted to have SVT vs. Afib in the ED which resolved after IV Cardizem bolus   Patient is on Cartia XT 240mg daily however reports that he has been taking this medication every other day so that he doesn't run out of his medication before his new PCP appointment at the end of the month   Currently patient is in NSR HR 70s  Resume Cardizem 240mg daily   Obtain Echo   Keep K>4, Mag >2 -- replete lytes   Monitor telemetry   Cardiology consult appreciated

## 2024-02-09 NOTE — CONSULTS
Consultation - Cardiology Team One  Hans Carreno 38 y.o. male MRN: [id number]  Unit/Bed#: ED 29 Encounter: [id number]    Inpatient consult to Cardiology  Consult performed by: PATEL Gipson  Consult ordered by: Danielle Temple PA-C          Physician Requesting Consult: Scott Day MD  Reason for Consult / Principal Problem:  SVT     Assessment/Plan:    Paroxysmal SVT   -Currently resolved and maintaining sinus rhythm with a rate in the 70s  - Continue Cardizem 240 mg daily  -Cardiac event monitor x 2 weeks after discharge  - Will follow-up with cardiology future discussion about EP evaluation for possible ablation    2.  Hypertension  - Blood pressure continue with fluctuations  - Continue diltiazem    3.  Obstructive sleep apnea  - Occasionally uses his CPAP  - Encouraged to use his CPAP regularly    4.  GERD    5.  Morbid obesity    HPI: Cardiologist Dr. Strauss     Hans Carreno is a 38 y.o. year old male who has a history of paroxysmal SVT, obstructive sleep apnea without CPAP compliance, hypertension, asthma, morbid obesity, GERD who presented to the emergency room overnight with complaints of palpitations over the last 2 days.  Reports that he his insurance changed and he was switching providers and only had a limited amount of his Cardizem and therefore was taking it every other day instead of every day.  He arrived to the emergency room he was noted to have be in SVT with resolution after IV Cardizem bolus.    Family history: mother with HF     Social history: denies ETOH, tobacco or substance abuse     REVIEW OF SYSTEMS:  Constitutional:  Denies fever or chills   Eyes:  Denies change in visual acuity   HENT:  Denies nasal congestion or sore throat   Respiratory:  Denies cough, orthopnea, PND or shortness of breath   Cardiovascular:  Denies chest pain, + palpitations, denies edema   GI:  Denies abdominal pain, nausea, vomiting, bloody stools or diarrhea   :  Denies dysuria, frequency,  difficulty in micturition and nocturia  Musculoskeletal:  Denies back pain or joint pain   Neurologic:  Denies headache, focal weakness or sensory changes   Endocrine:  Denies polyuria or polydipsia   Lymphatic:  Denies swollen glands   Psychiatric:  Denies depression or anxiety     Historical Information   Past Medical History:   Diagnosis Date    Asthma     CPAP (continuous positive airway pressure) dependence     Pericardial effusion     Sleep apnea     SVT (supraventricular tachycardia)      Past Surgical History:   Procedure Laterality Date    CARDIAC SURGERY       Social History     Substance and Sexual Activity   Alcohol Use Yes    Comment: rarely     Social History     Substance and Sexual Activity   Drug Use Never     Social History     Tobacco Use   Smoking Status Never   Smokeless Tobacco Never       Family History: non-contributory    MEDS & ALLERGIES:  all current active meds have been reviewed and current meds:   Current Facility-Administered Medications   Medication Dose Route Frequency    acetaminophen (TYLENOL) tablet 650 mg  650 mg Oral Q6H PRN    albuterol (PROVENTIL HFA,VENTOLIN HFA) inhaler 2 puff  2 puff Inhalation Q6H PRN    diltiazem (CARDIZEM CD) 24 hr capsule 240 mg  240 mg Oral Daily    enoxaparin (LOVENOX) subcutaneous injection 60 mg  60 mg Subcutaneous BID    Fluticasone Furoate-Vilanterol 100-25 mcg/actuation 1 puff  1 puff Inhalation Daily    montelukast (SINGULAIR) tablet 10 mg  10 mg Oral HS    pantoprazole (PROTONIX) EC tablet 40 mg  40 mg Oral Early Morning        No Known Allergies    OBJECTIVE:  Vitals:   Vitals:    24 1138   BP: (!) 178/105   Pulse: 67   Resp: 18   Temp:    SpO2: 96%     There is no height or weight on file to calculate BMI.    Systolic (24hrs), Av , Min:132 , Max:178     Diastolic (24hrs), Av, Min:70, Max:105    No intake or output data in the 24 hours ending 24 1152  Weight (last 2 days)       None          Invasive Devices        Peripheral Intravenous Line  Duration             Peripheral IV 02/09/24 Left Antecubital <1 day    Peripheral IV 02/09/24 Left;Dorsal (posterior) Hand <1 day                    PHYSICAL EXAMS:  General:  Patient is not in acute distress, laying in the bed comfortably, awake, alert   Head: Normocephalic, Atraumatic.   HEENT: White sclera, pink conjunctiva  Neck:  Supple, no neck vein distention  Respiratory: clear to auscultation   Cardiovascular:  PMI normal, S1-S2 normal, no murmurs, thrills, gallops, rubs, regular rhythm  GI:  Abdomen soft, non-tender, non-distended  Extremities: No edema, normal pulses  Integument:  No skin rashes or ulceration  Lymphatic:  No cervical or inguinal lymphadenopathy  Neurologic:  Patient is awake alert, responding to command, oriented to person, place and time     LABORATORY RESULTS:      CBC with diff:   Results from last 7 days   Lab Units 02/09/24  0614 02/09/24  0006   WBC Thousand/uL 11.84* 14.10*   HEMOGLOBIN g/dL 13.1 14.6   HEMATOCRIT % 40.1 45.4   MCV fL 84 85   PLATELETS Thousands/uL 191 285   RBC Million/uL 4.77 5.36   MCH pg 27.5 27.2   MCHC g/dL 32.7 32.2   RDW % 14.3 14.1   MPV fL 10.4 10.0   NRBC AUTO /100 WBCs 0 0       CMP:  Results from last 7 days   Lab Units 02/09/24  0614 02/09/24  0006   POTASSIUM mmol/L 4.0 3.7   CHLORIDE mmol/L 109* 104   CO2 mmol/L 22 27   BUN mg/dL 16 18   CREATININE mg/dL 0.67 0.92   CALCIUM mg/dL 8.2* 9.1   AST U/L  --  14   ALT U/L  --  17   ALK PHOS U/L  --  102   EGFR ml/min/1.73sq m 121 105       BMP:  Results from last 7 days   Lab Units 02/09/24  0614 02/09/24  0006   POTASSIUM mmol/L 4.0 3.7   CHLORIDE mmol/L 109* 104   CO2 mmol/L 22 27   BUN mg/dL 16 18   CREATININE mg/dL 0.67 0.92   CALCIUM mg/dL 8.2* 9.1            Results from last 7 days   Lab Units 02/09/24  0614 02/09/24  0006   MAGNESIUM mg/dL 1.9 1.9         Results from last 7 days   Lab Units 02/09/24  0006   TSH 3RD GENERATON uIU/mL 2.132           Lipid Profile:   No  "results found for: \"CHOL\"  Lab Results   Component Value Date    HDL 53 08/24/2021     Lab Results   Component Value Date    LDLCALC 92 08/24/2021     Lab Results   Component Value Date    TRIG 82 08/24/2021       Cardiac testing:   No results found for this or any previous visit.    No results found for this or any previous visit.    No valid procedures specified.  No results found for this or any previous visit.        Imaging:   I have personally reviewed pertinent reports.        EKG reviewed personally:  SVT     Telemetry reviewed personally:   NSR in the 70's       Code Status: Level 1 - Full Code      PATEL Gipson  2/9/2024,11:52 AM      "

## 2024-02-11 LAB
ATRIAL RATE: 159 BPM
QRS AXIS: 57 DEGREES
QRSD INTERVAL: 74 MS
QT INTERVAL: 358 MS
QTC INTERVAL: 464 MS
T WAVE AXIS: 44 DEGREES
VENTRICULAR RATE: 101 BPM

## 2024-02-13 LAB
ATRIAL RATE: 156 BPM
QRS AXIS: 52 DEGREES
QRSD INTERVAL: 76 MS
QT INTERVAL: 308 MS
QTC INTERVAL: 475 MS
T WAVE AXIS: 256 DEGREES
VENTRICULAR RATE: 143 BPM

## 2024-02-22 ENCOUNTER — CLINICAL SUPPORT (OUTPATIENT)
Dept: CARDIOLOGY CLINIC | Facility: CLINIC | Age: 39
End: 2024-02-22
Payer: COMMERCIAL

## 2024-02-22 DIAGNOSIS — I47.10 SVT (SUPRAVENTRICULAR TACHYCARDIA): Primary | ICD-10-CM

## 2024-02-22 PROCEDURE — 99211 OFF/OP EST MAY X REQ PHY/QHP: CPT | Performed by: NURSE PRACTITIONER

## 2024-02-22 NOTE — PROGRESS NOTES
Pt was in the office today for a zio patch placement instructed by Myah.    Zio patch was successfully placed and wear and care was explained.    Pt verbally understood.

## 2024-02-28 ENCOUNTER — APPOINTMENT (OUTPATIENT)
Dept: LAB | Facility: CLINIC | Age: 39
End: 2024-02-28
Payer: COMMERCIAL

## 2024-02-28 ENCOUNTER — OFFICE VISIT (OUTPATIENT)
Dept: FAMILY MEDICINE CLINIC | Facility: CLINIC | Age: 39
End: 2024-02-28
Payer: COMMERCIAL

## 2024-02-28 ENCOUNTER — TELEPHONE (OUTPATIENT)
Dept: ADMINISTRATIVE | Facility: OTHER | Age: 39
End: 2024-02-28

## 2024-02-28 VITALS
TEMPERATURE: 98 F | HEART RATE: 78 BPM | HEIGHT: 70 IN | WEIGHT: 315 LBS | BODY MASS INDEX: 45.1 KG/M2 | OXYGEN SATURATION: 95 % | DIASTOLIC BLOOD PRESSURE: 84 MMHG | SYSTOLIC BLOOD PRESSURE: 130 MMHG

## 2024-02-28 DIAGNOSIS — Z13.0 SCREENING FOR DEFICIENCY ANEMIA: ICD-10-CM

## 2024-02-28 DIAGNOSIS — Z13.220 SCREENING FOR LIPID DISORDERS: ICD-10-CM

## 2024-02-28 DIAGNOSIS — J45.40 MODERATE PERSISTENT ASTHMA WITHOUT COMPLICATION: ICD-10-CM

## 2024-02-28 DIAGNOSIS — I47.10 SVT (SUPRAVENTRICULAR TACHYCARDIA): ICD-10-CM

## 2024-02-28 DIAGNOSIS — Z76.89 ENCOUNTER TO ESTABLISH CARE: ICD-10-CM

## 2024-02-28 DIAGNOSIS — E66.01 MORBID OBESITY WITH BMI OF 60.0-69.9, ADULT (HCC): ICD-10-CM

## 2024-02-28 DIAGNOSIS — Z13.29 SCREENING FOR THYROID DISORDER: ICD-10-CM

## 2024-02-28 DIAGNOSIS — Z11.59 NEED FOR HEPATITIS C SCREENING TEST: ICD-10-CM

## 2024-02-28 DIAGNOSIS — K21.9 GASTROESOPHAGEAL REFLUX DISEASE WITHOUT ESOPHAGITIS: ICD-10-CM

## 2024-02-28 DIAGNOSIS — Z23 ENCOUNTER FOR IMMUNIZATION: ICD-10-CM

## 2024-02-28 DIAGNOSIS — Z13.1 SCREENING FOR DIABETES MELLITUS: ICD-10-CM

## 2024-02-28 DIAGNOSIS — Z11.4 SCREENING FOR HIV (HUMAN IMMUNODEFICIENCY VIRUS): ICD-10-CM

## 2024-02-28 DIAGNOSIS — Z00.00 ANNUAL PHYSICAL EXAM: Primary | ICD-10-CM

## 2024-02-28 DIAGNOSIS — G47.33 OBSTRUCTIVE SLEEP APNEA SYNDROME: ICD-10-CM

## 2024-02-28 LAB
ALBUMIN SERPL BCP-MCNC: 4 G/DL (ref 3.5–5)
ALP SERPL-CCNC: 98 U/L (ref 34–104)
ALT SERPL W P-5'-P-CCNC: 19 U/L (ref 7–52)
ANION GAP SERPL CALCULATED.3IONS-SCNC: 9 MMOL/L
AST SERPL W P-5'-P-CCNC: 17 U/L (ref 13–39)
BASOPHILS # BLD AUTO: 0.07 THOUSANDS/ÂΜL (ref 0–0.1)
BASOPHILS NFR BLD AUTO: 1 % (ref 0–1)
BILIRUB SERPL-MCNC: 0.56 MG/DL (ref 0.2–1)
BUN SERPL-MCNC: 10 MG/DL (ref 5–25)
CALCIUM SERPL-MCNC: 8.9 MG/DL (ref 8.4–10.2)
CHLORIDE SERPL-SCNC: 103 MMOL/L (ref 96–108)
CHOLEST SERPL-MCNC: 148 MG/DL
CO2 SERPL-SCNC: 29 MMOL/L (ref 21–32)
CREAT SERPL-MCNC: 0.85 MG/DL (ref 0.6–1.3)
EOSINOPHIL # BLD AUTO: 0.39 THOUSAND/ÂΜL (ref 0–0.61)
EOSINOPHIL NFR BLD AUTO: 3 % (ref 0–6)
ERYTHROCYTE [DISTWIDTH] IN BLOOD BY AUTOMATED COUNT: 14.4 % (ref 11.6–15.1)
GFR SERPL CREATININE-BSD FRML MDRD: 110 ML/MIN/1.73SQ M
GLUCOSE P FAST SERPL-MCNC: 83 MG/DL (ref 65–99)
HCT VFR BLD AUTO: 47.6 % (ref 36.5–49.3)
HDLC SERPL-MCNC: 48 MG/DL
HGB BLD-MCNC: 14.6 G/DL (ref 12–17)
IMM GRANULOCYTES # BLD AUTO: 0.1 THOUSAND/UL (ref 0–0.2)
IMM GRANULOCYTES NFR BLD AUTO: 1 % (ref 0–2)
LDLC SERPL CALC-MCNC: 88 MG/DL (ref 0–100)
LYMPHOCYTES # BLD AUTO: 3.03 THOUSANDS/ÂΜL (ref 0.6–4.47)
LYMPHOCYTES NFR BLD AUTO: 24 % (ref 14–44)
MCH RBC QN AUTO: 26.9 PG (ref 26.8–34.3)
MCHC RBC AUTO-ENTMCNC: 30.7 G/DL (ref 31.4–37.4)
MCV RBC AUTO: 88 FL (ref 82–98)
MONOCYTES # BLD AUTO: 0.94 THOUSAND/ÂΜL (ref 0.17–1.22)
MONOCYTES NFR BLD AUTO: 7 % (ref 4–12)
NEUTROPHILS # BLD AUTO: 8.16 THOUSANDS/ÂΜL (ref 1.85–7.62)
NEUTS SEG NFR BLD AUTO: 64 % (ref 43–75)
NRBC BLD AUTO-RTO: 0 /100 WBCS
PLATELET # BLD AUTO: 276 THOUSANDS/UL (ref 149–390)
PMV BLD AUTO: 10.9 FL (ref 8.9–12.7)
POTASSIUM SERPL-SCNC: 4.5 MMOL/L (ref 3.5–5.3)
PROT SERPL-MCNC: 7.3 G/DL (ref 6.4–8.4)
RBC # BLD AUTO: 5.43 MILLION/UL (ref 3.88–5.62)
SODIUM SERPL-SCNC: 141 MMOL/L (ref 135–147)
TRIGL SERPL-MCNC: 62 MG/DL
TSH SERPL DL<=0.05 MIU/L-ACNC: 2.73 UIU/ML (ref 0.45–4.5)
WBC # BLD AUTO: 12.69 THOUSAND/UL (ref 4.31–10.16)

## 2024-02-28 PROCEDURE — 36415 COLL VENOUS BLD VENIPUNCTURE: CPT

## 2024-02-28 PROCEDURE — 80053 COMPREHEN METABOLIC PANEL: CPT

## 2024-02-28 PROCEDURE — 84443 ASSAY THYROID STIM HORMONE: CPT

## 2024-02-28 PROCEDURE — 99385 PREV VISIT NEW AGE 18-39: CPT | Performed by: NURSE PRACTITIONER

## 2024-02-28 PROCEDURE — 80061 LIPID PANEL: CPT

## 2024-02-28 PROCEDURE — 85025 COMPLETE CBC W/AUTO DIFF WBC: CPT

## 2024-02-28 RX ORDER — FLUTICASONE PROPIONATE AND SALMETEROL 250; 50 UG/1; UG/1
1 POWDER RESPIRATORY (INHALATION) DAILY
Qty: 60 BLISTER | Refills: 5 | Status: SHIPPED | OUTPATIENT
Start: 2024-02-28

## 2024-02-28 RX ORDER — FAMOTIDINE 20 MG/1
20 TABLET, FILM COATED ORAL DAILY
COMMUNITY

## 2024-02-28 RX ORDER — DILTIAZEM HYDROCHLORIDE 240 MG/1
240 CAPSULE, COATED, EXTENDED RELEASE ORAL DAILY
Qty: 90 CAPSULE | Refills: 1 | Status: SHIPPED | OUTPATIENT
Start: 2024-02-28

## 2024-02-28 RX ORDER — MONTELUKAST SODIUM 10 MG/1
10 TABLET ORAL
Qty: 90 TABLET | Refills: 3 | Status: SHIPPED | OUTPATIENT
Start: 2024-02-28

## 2024-02-28 RX ORDER — ALBUTEROL SULFATE 90 UG/1
2 AEROSOL, METERED RESPIRATORY (INHALATION) EVERY 6 HOURS PRN
Qty: 18 G | Refills: 5 | Status: SHIPPED | OUTPATIENT
Start: 2024-02-28

## 2024-02-28 NOTE — TELEPHONE ENCOUNTER
Upon review of the In Basket request we were able to locate, review, and update the patient chart as requested for Hepatitis C  and HIV.    Any additional questions or concerns should be emailed to the Practice Liaisons via the appropriate education email address, please do not reply via In Basket.    Thank you  Izzy Silva

## 2024-02-28 NOTE — PROGRESS NOTES
ADULT ANNUAL PHYSICAL  [facility] CARE    NAME: Hans Carreno  AGE: 38 y.o. SEX: male  : 1985     DATE: 2024     Assessment and Plan:     Problem List Items Addressed This Visit     Asthma    Relevant Medications    Fluticasone-Salmeterol (Advair Diskus) 250-50 mcg/dose inhaler    montelukast (SINGULAIR) 10 mg tablet    albuterol (PROVENTIL HFA,VENTOLIN HFA) 90 mcg/act inhaler    SVT (supraventricular tachycardia)    Relevant Medications    diltiazem (CARDIZEM CD) 240 mg 24 hr capsule    GERD (gastroesophageal reflux disease)    Relevant Medications    famotidine (MM Famotidine) 20 mg tablet    Morbid obesity with BMI of 60.0-69.9, adult (HCC)    Relevant Orders    Ambulatory Referral to Weight Management    Obstructive sleep apnea syndrome   Other Visit Diagnoses     Annual physical exam    -  Primary    Need for hepatitis C screening test        Screening for HIV (human immunodeficiency virus)        Encounter for immunization        Screening for thyroid disorder        Relevant Orders    TSH, 3rd generation with Free T4 reflex    Screening for diabetes mellitus        Relevant Orders    Comprehensive metabolic panel    Screening for lipid disorders        Relevant Orders    Lipid Panel with Direct LDL reflex    Screening for deficiency anemia        Relevant Orders    CBC and differential    Encounter to establish care              Immunizations and preventive care screenings were discussed with patient today. Appropriate education was printed on patient's after visit summary.    Counseling:  Alcohol/drug use: discussed moderation in alcohol intake, the recommendations for healthy alcohol use, and avoidance of illicit drug use.  Dental Health: discussed importance of regular tooth brushing, flossing, and dental visits.  Injury prevention: discussed safety/seat belts, safety helmets, smoke detectors, carbon dioxide detectors, and smoking near  "bedding or upholstery.  Sexual health: discussed sexually transmitted diseases, partner selection, use of condoms, avoidance of unintended pregnancy, and contraceptive alternatives.  Exercise: the importance of regular exercise/physical activity was discussed. Recommend exercise 3-5 times per week for at least 30 minutes.          Return in about 6 months (around 8/28/2024).     Chief Complaint:     Chief Complaint   Patient presents with   • Establish Care      History of Present Illness:     Adult Annual Physical   Patient here for a comprehensive physical exam. The patient reports problems - would like to discuss weight loss.  He states he was seeing bariatrics in 2022, however he had some family issues which precluded him from making follow-ups.  Patient states that he does not eat very much in quantity and has a well-balanced diet when he is not working however he does report he eats junk food, fast food, and has an unhealthy diet while at work.  He reports he has 2 full-time jobs.  Discussed risks and appropriateness of Adipex, intermittent fasting, injectables, as well as revisiting weight loss specialty .    Hypertension  This is a chronic problem. The problem is controlled. There are no associated agents to hypertension. Risk factors for coronary artery disease include male gender and obesity. Past treatments include calcium channel blockers. The current treatment provides significant improvement. There are no compliance problems.  There is no history of angina, kidney disease or CAD/MI. There is no history of chronic renal disease.   Asthma  This is a chronic problem. His symptoms are alleviated by steroid inhaler and beta-agonist. He reports significant improvement on treatment. His past medical history is significant for asthma.       Diet and Physical Activity  Diet/Nutrition: intermittent fasting, consuming 3-5 servings of fruits/vegetables daily, and will eat \"junk food\" when at work and admits diet " is not the best while working and he has 2 FT jobs .   Exercise: walking and 1-2 times a week on average.      Depression Screening  PHQ-2/9 Depression Screening    Little interest or pleasure in doing things: 0 - not at all  Feeling down, depressed, or hopeless: 0 - not at all  Trouble falling or staying asleep, or sleeping too much: 0 - not at all  Feeling tired or having little energy: 0 - not at all  Poor appetite or overeatin - not at all  Feeling bad about yourself - or that you are a failure or have let yourself or your family down: 0 - not at all  Trouble concentrating on things, such as reading the newspaper or watching television: 0 - not at all  Moving or speaking so slowly that other people could have noticed. Or the opposite - being so fidgety or restless that you have been moving around a lot more than usual: 0 - not at all  Thoughts that you would be better off dead, or of hurting yourself in some way: 0 - not at all  PHQ-2 Score: 0  PHQ-2 Interpretation: Negative depression screen  PHQ-9 Score: 0  PHQ-9 Interpretation: No or Minimal depression       General Health  Sleep:  is up every 3 hours or so to urinate    Hearing: normal - bilateral.  Vision: no vision problems and most recent eye exam >1 year ago.   Dental: regular dental visits, brushes teeth once daily, and flosses teeth occasionally.        Health  History of STDs?: no.    Advanced Care Planning  Do you have an advanced directive? no  Do you have a durable medical power of ? no  ACP document given to the patient? yes     Review of Systems:     Review of Systems   All other systems reviewed and are negative.     Past Medical History:     Past Medical History:   Diagnosis Date   • Asthma    • CPAP (continuous positive airway pressure) dependence    • Pericardial effusion    • Sleep apnea    • SVT (supraventricular tachycardia)       Past Surgical History:     Past Surgical History:   Procedure Laterality Date   • CARDIAC  SURGERY        Social History:     Social History     Socioeconomic History   • Marital status: /Civil Union     Spouse name: None   • Number of children: None   • Years of education: None   • Highest education level: None   Occupational History   • None   Tobacco Use   • Smoking status: Never   • Smokeless tobacco: Never   Vaping Use   • Vaping status: Never Used   Substance and Sexual Activity   • Alcohol use: Yes     Comment: rarely   • Drug use: Never   • Sexual activity: None   Other Topics Concern   • None   Social History Narrative   • None     Social Determinants of Health     Financial Resource Strain: Not on file   Food Insecurity: Not on file   Transportation Needs: No Transportation Needs (9/30/2021)    PRAPARE - Transportation    • Lack of Transportation (Medical): No    • Lack of Transportation (Non-Medical): No   Physical Activity: Not on file   Stress: Not on file   Social Connections: Not on file   Intimate Partner Violence: Not on file   Housing Stability: Not on file      Family History:     Family History   Problem Relation Age of Onset   • Diabetes Mother    • Heart failure Mother    • Hypertension Father    • COPD Father       Current Medications:     Current Outpatient Medications   Medication Sig Dispense Refill   • albuterol (PROVENTIL HFA,VENTOLIN HFA) 90 mcg/act inhaler Inhale 2 puffs every 6 (six) hours as needed for wheezing 18 g 5   • diltiazem (CARDIZEM CD) 240 mg 24 hr capsule Take 1 capsule (240 mg total) by mouth daily 90 capsule 1   • famotidine (MM Famotidine) 20 mg tablet Take 20 mg by mouth daily     • Fluticasone-Salmeterol (Advair Diskus) 250-50 mcg/dose inhaler Inhale 1 puff daily 60 blister 5   • montelukast (SINGULAIR) 10 mg tablet Take 1 tablet (10 mg total) by mouth daily at bedtime 90 tablet 3     No current facility-administered medications for this visit.      Allergies:     No Known Allergies   Physical Exam:     /84   Pulse 78   Temp 98 °F (36.7 °C)  "(Tympanic)   Ht 5' 10\" (1.778 m)   Wt (!) 214 kg (471 lb)   SpO2 95%   BMI 67.58 kg/m²     Physical Exam  Vitals and nursing note reviewed.   Constitutional:       Appearance: He is well-developed. He is obese.   HENT:      Head: Normocephalic and atraumatic.      Right Ear: External ear normal.      Left Ear: External ear normal.      Nose: Nose normal.   Eyes:      Conjunctiva/sclera: Conjunctivae normal.      Pupils: Pupils are equal, round, and reactive to light.   Cardiovascular:      Rate and Rhythm: Normal rate and regular rhythm.      Heart sounds: Normal heart sounds.   Pulmonary:      Effort: Pulmonary effort is normal.      Breath sounds: Normal breath sounds.   Abdominal:      General: Bowel sounds are normal.      Palpations: Abdomen is soft.   Genitourinary:     Comments: Deferred  Musculoskeletal:         General: Normal range of motion.      Cervical back: Normal range of motion and neck supple.   Skin:     General: Skin is warm and dry.      Capillary Refill: Capillary refill takes less than 2 seconds.   Neurological:      Mental Status: He is alert and oriented to person, place, and time.   Psychiatric:         Behavior: Behavior normal.         Thought Content: Thought content normal.         Judgment: Judgment normal.          PATEL Hutchinson   [facility]"

## 2024-02-28 NOTE — TELEPHONE ENCOUNTER
----- Message from Snehal Wiley sent at 2/28/2024 10:17 AM EST -----  Regarding: HIV/Hep C  02/28/24 10:18 AM    Hello, our patient Hans Carreno has had Hepatitis C and HIV, and Hep C completed/performed. Please assist in updating the patient chart by pulling a previous Electronic Medical Record (EMR) document. The previous EMR is [facility]. The date of service is 01/18/2022  .    Thank you,  Snehal Wiley  [facility] PR[facility]

## 2024-03-12 ENCOUNTER — OFFICE VISIT (OUTPATIENT)
Dept: CARDIOLOGY CLINIC | Facility: CLINIC | Age: 39
End: 2024-03-12
Payer: COMMERCIAL

## 2024-03-12 VITALS
HEIGHT: 70 IN | HEART RATE: 67 BPM | DIASTOLIC BLOOD PRESSURE: 90 MMHG | WEIGHT: 315 LBS | OXYGEN SATURATION: 94 % | SYSTOLIC BLOOD PRESSURE: 132 MMHG | BODY MASS INDEX: 45.1 KG/M2

## 2024-03-12 DIAGNOSIS — I10 PRIMARY HYPERTENSION: ICD-10-CM

## 2024-03-12 DIAGNOSIS — I47.10 SVT (SUPRAVENTRICULAR TACHYCARDIA): Primary | ICD-10-CM

## 2024-03-12 PROCEDURE — 99214 OFFICE O/P EST MOD 30 MIN: CPT

## 2024-03-12 NOTE — PROGRESS NOTES
[facility]  [address] [id number]  Cardiology Office Note    Hans Carreno 39 y.o. male MRN: [id number]    03/12/24          Assessment/Plan:  1. PSVT  Patient notes palpitations. Completed 2 week event monitor, awaiting report  Continue Cardizem  mg daily; reassess once event monitor results are available.     2. HTN  Continue Cardizem  mg daily    3. TALIA  Encouraged to use CPAP consistently    Follow up: 3 months or sooner as needed  All questions and concerns addressed.  Patient was advised to report any problems requiring medical attention.          1. SVT (supraventricular tachycardia)        2. Primary hypertension            HPI: Hans Carreno is a 39 y.o. year old male with PMH of SVT, HTN, TALIA who presents for hospital follow-up.     Patient [facility] with palpitations and was found to be in SVT. Patient notes that he was taking his Cardizem every other day. His SVT resolved with IV bolus of Cardizem.     When patient has episodes of SVT patient notes that he can feel palpitations in his neck. Usually lasts a few seconds but was prolonged when he presented to the hospital.    Patient notes that his first episode of SVT that he felt was in 2008 and he received SVT diagnosis in 2010.    Patient has TALIA, he notes that he does not use his CPAP consistently.    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 He notes that he is less active than he used to be and has inconsistent diet.     Patient notes chronic lymphedema that is managed with compression socks.     Patient was instructed to call the office or seek medical attention if any significant chest pain, shortness of breath, palpitations, or lower extremity swelling develop. All medications reviewed and patient is tolerating medications without side effects.     Family History:   Mom- CHF    Social history:   Patient denies tobacco, significant  alcohol, or recreational drug use.            Patient Active Problem List   Diagnosis    Cellulitis of left lower extremity    Asthma    SVT (supraventricular tachycardia)    Pericardial effusion    GERD (gastroesophageal reflux disease)    Morbid obesity with BMI of 60.0-69.9, adult (HCC)    Leukocytosis    Obstructive sleep apnea syndrome    Hypertension    Morbid obesity (HCC)       No Known Allergies      Current Outpatient Medications:     albuterol (PROVENTIL HFA,VENTOLIN HFA) 90 mcg/act inhaler, Inhale 2 puffs every 6 (six) hours as needed for wheezing, Disp: 18 g, Rfl: 5    diltiazem (CARDIZEM CD) 240 mg 24 hr capsule, Take 1 capsule (240 mg total) by mouth daily, Disp: 90 capsule, Rfl: 1    famotidine (MM Famotidine) 20 mg tablet, Take 20 mg by mouth daily, Disp: , Rfl:     Fluticasone-Salmeterol (Advair Diskus) 250-50 mcg/dose inhaler, Inhale 1 puff daily, Disp: 60 blister, Rfl: 5    montelukast (SINGULAIR) 10 mg tablet, Take 1 tablet (10 mg total) by mouth daily at bedtime, Disp: 90 tablet, Rfl: 3    Past Medical History:   Diagnosis Date    Asthma     CPAP (continuous positive airway pressure) dependence     Pericardial effusion     Sleep apnea     SVT (supraventricular tachycardia)        Family History   Problem Relation Age of Onset    Diabetes Mother     Heart failure Mother     Hypertension Father     COPD Father        Past Surgical History:   Procedure Laterality Date    CARDIAC SURGERY         Social History     Socioeconomic History    Marital status: /Civil Union     Spouse name: Not on file    Number of children: Not on file    Years of education: Not on file    Highest education level: Not on file   Occupational History    Not on file   Tobacco Use    Smoking status: Never    Smokeless tobacco: Never   Vaping Use    Vaping status: Never Used   Substance and Sexual Activity    Alcohol use: Yes     Comment: rarely    Drug use: Never    Sexual activity: Not on file   Other Topics Concern  "   Not on file   Social History Narrative    Not on file     Social Determinants of Health     Financial Resource Strain: Not on file   Food Insecurity: Not on file   Transportation Needs: No Transportation Needs (9/30/2021)    PRAPARE - Transportation     Lack of Transportation (Medical): No     Lack of Transportation (Non-Medical): No   Physical Activity: Not on file   Stress: Not on file   Social Connections: Not on file   Intimate Partner Violence: Not on file   Housing Stability: Not on file       Review of symptoms:   Review of Systems   Constitutional:  Negative for chills, diaphoresis and fever.   Respiratory:  Negative for cough, chest tightness and shortness of breath.    Cardiovascular:  Positive for palpitations and leg swelling (lymphedema). Negative for chest pain.   Gastrointestinal:  Negative for abdominal distention, blood in stool, nausea and vomiting.   Genitourinary:  Negative for difficulty urinating.   Musculoskeletal:  Negative for arthralgias and back pain.   Neurological:  Negative for dizziness, syncope, light-headedness and headaches.   Psychiatric/Behavioral:  Negative for agitation and confusion. The patient is not nervous/anxious.         Vitals: /90 (BP Location: Left arm, Patient Position: Sitting, Cuff Size: Extra-Large)   Pulse 67   Ht 5' 10\" (1.778 m)   Wt (!) 213 kg (469 lb)   SpO2 94%   BMI 67.29 kg/m²         Physical Exam:     Physical Exam  Vitals and nursing note reviewed.   Constitutional:       General: He is not in acute distress.     Appearance: He is well-developed. He is obese.   HENT:      Head: Normocephalic and atraumatic.   Eyes:      Conjunctiva/sclera: Conjunctivae normal.   Neck:      Vascular: No carotid bruit.   Cardiovascular:      Rate and Rhythm: Normal rate and regular rhythm.      Heart sounds: Normal heart sounds. No murmur heard.  Pulmonary:      Effort: Pulmonary effort is normal. No respiratory distress.      Breath sounds: Normal breath " sounds.   Abdominal:      Palpations: Abdomen is soft.      Tenderness: There is no abdominal tenderness.   Musculoskeletal:         General: No swelling.      Cervical back: Neck supple.      Right lower leg: Edema (nonpitting, lymphedema) present.      Left lower leg: Edema (nonpitting, lymphedema) present.   Skin:     General: Skin is warm and dry.      Capillary Refill: Capillary refill takes less than 2 seconds.   Neurological:      Mental Status: He is alert and oriented to person, place, and time.   Psychiatric:         Mood and Affect: Mood normal.            Thank you for allowing me to participate in the care and evaluation of your patient.  Should you have any questions, please feel free to contact me.

## 2024-03-14 ENCOUNTER — CLINICAL SUPPORT (OUTPATIENT)
Dept: CARDIOLOGY CLINIC | Facility: CLINIC | Age: 39
End: 2024-03-14
Payer: COMMERCIAL

## 2024-03-14 ENCOUNTER — TELEPHONE (OUTPATIENT)
Dept: CARDIOLOGY CLINIC | Facility: CLINIC | Age: 39
End: 2024-03-14

## 2024-03-14 DIAGNOSIS — I47.10 SVT (SUPRAVENTRICULAR TACHYCARDIA): ICD-10-CM

## 2024-03-14 PROCEDURE — 93248 EXT ECG>7D<15D REV&INTERPJ: CPT | Performed by: INTERNAL MEDICINE

## 2024-03-14 NOTE — TELEPHONE ENCOUNTER
[facility]     URGENT REPORT [facility]    Svt 192  heart beat   30 second substrain   1 minute 30 second   Strip 1 page 5-7

## 2024-03-25 ENCOUNTER — TELEPHONE (OUTPATIENT)
Dept: CARDIOLOGY CLINIC | Facility: CLINIC | Age: 39
End: 2024-03-25

## 2024-03-25 DIAGNOSIS — I47.10 SVT (SUPRAVENTRICULAR TACHYCARDIA): ICD-10-CM

## 2024-03-25 RX ORDER — DILTIAZEM HYDROCHLORIDE 360 MG/1
360 CAPSULE, EXTENDED RELEASE ORAL DAILY
Qty: 30 CAPSULE | Refills: 3 | Status: SHIPPED | OUTPATIENT
Start: 2024-03-25

## 2024-03-25 NOTE — TELEPHONE ENCOUNTER
----- Message from PATEL Devi sent at 3/25/2024  4:34 PM EDT -----  Please let patient know that he was still having episodes of SVT, the longest episode lasting  15 minutes and 36 seconds. He did have an episode of VT lasting 4 bts. Will increase Cardizem to 360 mg daily. Patient would also benefit from being established with electrophysiology. Referral placed in chart.     Given episode of VT and history of SVT, patient would benefit from ischemic evaluation. I will look into which testing would be best for patient since some testing has weight limitations, but I will get back to patient once I find more information on this.

## 2024-03-25 NOTE — TELEPHONE ENCOUNTER
P/C and went over results mick Maxwell , and medication increased per Vandana SWEENEY. Pt verbally understood and verbalized if any questions will contact office.     Pt is aware new script went over to pharmacy

## 2024-03-26 ENCOUNTER — TELEPHONE (OUTPATIENT)
Dept: CARDIOLOGY CLINIC | Facility: CLINIC | Age: 39
End: 2024-03-26

## 2024-03-26 DIAGNOSIS — I47.29 NSVT (NONSUSTAINED VENTRICULAR TACHYCARDIA) (HCC): Primary | ICD-10-CM

## 2024-03-26 NOTE — TELEPHONE ENCOUNTER
----- Message from PATEL Devi sent at 3/26/2024  4:10 PM EDT -----  Please let patient know that we will order nuclear stress test. It needs to be scheduled at [facility], not at the office due to the weight limit of the equipment. Order placed in chart. Thank you!

## 2024-03-26 NOTE — TELEPHONE ENCOUNTER
Spoke with and he verbally understood result and instruction of medication . Patient is aware of Ep ref .

## 2024-04-11 ENCOUNTER — OFFICE VISIT (OUTPATIENT)
Dept: URGENT CARE | Facility: CLINIC | Age: 39
End: 2024-04-11
Payer: COMMERCIAL

## 2024-04-11 VITALS
OXYGEN SATURATION: 95 % | RESPIRATION RATE: 20 BRPM | BODY MASS INDEX: 67.29 KG/M2 | DIASTOLIC BLOOD PRESSURE: 96 MMHG | WEIGHT: 315 LBS | HEART RATE: 102 BPM | TEMPERATURE: 97.5 F | SYSTOLIC BLOOD PRESSURE: 158 MMHG

## 2024-04-11 DIAGNOSIS — K61.1 PERIRECTAL ABSCESS: Primary | ICD-10-CM

## 2024-04-11 PROCEDURE — 99213 OFFICE O/P EST LOW 20 MIN: CPT | Performed by: STUDENT IN AN ORGANIZED HEALTH CARE EDUCATION/TRAINING PROGRAM

## 2024-04-11 RX ORDER — SULFAMETHOXAZOLE AND TRIMETHOPRIM 800; 160 MG/1; MG/1
1 TABLET ORAL EVERY 12 HOURS SCHEDULED
Qty: 12 TABLET | Refills: 0 | Status: SHIPPED | OUTPATIENT
Start: 2024-04-11 | End: 2024-04-17

## 2024-04-11 RX ORDER — CEFUROXIME AXETIL 500 MG/1
500 TABLET ORAL EVERY 12 HOURS SCHEDULED
Qty: 12 TABLET | Refills: 0 | Status: SHIPPED | OUTPATIENT
Start: 2024-04-11 | End: 2024-04-17

## 2024-04-11 NOTE — PROGRESS NOTES
[facility]        NAME: Hans Carreno is a 39 y.o. male  : 1985    MRN: [id number]  DATE: 2024  TIME: 7:35 PM    Assessment and Plan   Perirectal abscess [K61.1]  1. Perirectal abscess  cefuroxime (CEFTIN) 500 mg tablet    sulfamethoxazole-trimethoprim (BACTRIM DS) 800-160 mg per tablet        Patient has a history and exam consistent with a perirectal abscess that has drained through an open incision spontaneously.  There is no further fluctuance or evidence of abscess fluid that still needs to drain.  Incision edges are approximated and should heal through secondary intention.  At this time I advised the patient to keep the area clean as best he can with soap and water multiple times a day.  I did prescribe him antibiotics to take as above.  Advised to follow-up with his PCP and go to the ER if symptoms worsen.    Patient Instructions       Follow up with PCP in 3-5 days.  Proceed to  ER if symptoms worsen.    If tests have been performed at [facility], our office will contact you with results if changes need to be made to the care plan discussed with you at the visit.  You can review your full results on [facility].    Chief Complaint     Chief Complaint   Patient presents with    Mass     Started easter. Boil near rectal area. Popped 2 nights ago. Bleeding and purulent drain. No fever or chills. No h/o hemorrhoids. No fever or chills. Diarrhea today.          History of Present Illness       Patient presents for evaluation of perirectal abscess.  He states he noticed it 1 week ago.  He reports 2 nights ago the abscess popped and drained significant bloody purulent discharge.  He states the discharge has slowed down he still has some blood on the toilet paper when he wipes after having a bowel movement but otherwise there is no drainage.  Denies any fevers or chills.        Review of Systems   Review of Systems      Current Medications       Current Outpatient Medications:      albuterol (PROVENTIL HFA,VENTOLIN HFA) 90 mcg/act inhaler, Inhale 2 puffs every 6 (six) hours as needed for wheezing, Disp: 18 g, Rfl: 5    cefuroxime (CEFTIN) 500 mg tablet, Take 1 tablet (500 mg total) by mouth every 12 (twelve) hours for 6 days, Disp: 12 tablet, Rfl: 0    diltiazem (CARDIZEM CD) 360 MG 24 hr capsule, Take 1 capsule (360 mg total) by mouth daily, Disp: 30 capsule, Rfl: 3    famotidine (MM Famotidine) 20 mg tablet, Take 20 mg by mouth daily, Disp: , Rfl:     Fluticasone-Salmeterol (Advair Diskus) 250-50 mcg/dose inhaler, Inhale 1 puff daily, Disp: 60 blister, Rfl: 5    montelukast (SINGULAIR) 10 mg tablet, Take 1 tablet (10 mg total) by mouth daily at bedtime, Disp: 90 tablet, Rfl: 3    sulfamethoxazole-trimethoprim (BACTRIM DS) 800-160 mg per tablet, Take 1 tablet by mouth every 12 (twelve) hours for 6 days, Disp: 12 tablet, Rfl: 0    Current Allergies     Allergies as of 04/11/2024    (No Known Allergies)            The following portions of the patient's history were reviewed and updated as appropriate: allergies, current medications, past family history, past medical history, past social history, past surgical history and problem list.     Past Medical History:   Diagnosis Date    Asthma     CPAP (continuous positive airway pressure) dependence     Pericardial effusion     Sleep apnea     SVT (supraventricular tachycardia)        Past Surgical History:   Procedure Laterality Date    CARDIAC SURGERY         Family History   Problem Relation Age of Onset    Diabetes Mother     Heart failure Mother     Hypertension Father     COPD Father          Medications have been verified.        Objective   /96   Pulse 102   Temp 97.5 °F (36.4 °C)   Resp 20   Wt (!) 213 kg (469 lb)   SpO2 95%   BMI 67.29 kg/m²   No LMP for male patient.       Physical Exam     Physical Exam  Constitutional:       General: He is not in acute distress.     Appearance: He is well-developed. He is not  toxic-appearing.   HENT:      Head: Normocephalic and atraumatic.      Right Ear: External ear normal.      Left Ear: External ear normal.   Eyes:      Extraocular Movements: Extraocular movements intact.   Cardiovascular:      Rate and Rhythm: Normal rate.   Pulmonary:      Effort: Pulmonary effort is normal.   Skin:         Neurological:      General: No focal deficit present.      Mental Status: He is alert and oriented to person, place, and time.   Psychiatric:         Mood and Affect: Mood normal.

## 2024-04-11 NOTE — PATIENT INSTRUCTIONS
Take antibiotics as prescribed with probiotic  Keep incision clean and dry, wash with soap and water 3x/day  Fu with PCP if not getting better in 5-7 days  Go to ED if infection occurs or symptoms worsen

## 2024-04-24 ENCOUNTER — TELEPHONE (OUTPATIENT)
Dept: FAMILY MEDICINE CLINIC | Facility: CLINIC | Age: 39
End: 2024-04-24

## 2024-04-24 DIAGNOSIS — I47.10 SVT (SUPRAVENTRICULAR TACHYCARDIA): ICD-10-CM

## 2024-04-24 NOTE — TELEPHONE ENCOUNTER
Pt scheduled an hung thru [employer] for a physical today at 1: 00 PM. Pt states that he has physical paperwork that needs to be fill out. Advised pt to drop off paperwork, and we can fill it out with information from 02/28/2024 which is when he had his physical done. Made pt aware that hung will be canceled.

## 2024-04-25 ENCOUNTER — TELEPHONE (OUTPATIENT)
Dept: FAMILY MEDICINE CLINIC | Facility: CLINIC | Age: 39
End: 2024-04-25

## 2024-04-25 NOTE — TELEPHONE ENCOUNTER
Called Pt to advise paperwork is seeking cardiac clearance therefore Cardiology would need to do their portion of the paperwork and provide clearance before Shon can sign off on the provider portion. Pt not happy with this news but I did explain that it specifies cardiac clearance.  Holding paperwork for Pt to .

## 2024-04-25 NOTE — TELEPHONE ENCOUNTER
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

## 2024-04-26 RX ORDER — DILTIAZEM HYDROCHLORIDE 360 MG/1
360 CAPSULE, EXTENDED RELEASE ORAL DAILY
Qty: 90 CAPSULE | Refills: 0 | Status: SHIPPED | OUTPATIENT
Start: 2024-04-26

## 2024-04-29 ENCOUNTER — TELEPHONE (OUTPATIENT)
Dept: CARDIOLOGY CLINIC | Facility: CLINIC | Age: 39
End: 2024-04-29

## 2024-04-29 ENCOUNTER — OFFICE VISIT (OUTPATIENT)
Dept: CARDIOLOGY CLINIC | Facility: CLINIC | Age: 39
End: 2024-04-29
Payer: COMMERCIAL

## 2024-04-29 VITALS
RESPIRATION RATE: 16 BRPM | WEIGHT: 315 LBS | DIASTOLIC BLOOD PRESSURE: 84 MMHG | SYSTOLIC BLOOD PRESSURE: 132 MMHG | OXYGEN SATURATION: 96 % | BODY MASS INDEX: 45.1 KG/M2 | HEIGHT: 70 IN | HEART RATE: 84 BPM

## 2024-04-29 DIAGNOSIS — I47.10 SVT (SUPRAVENTRICULAR TACHYCARDIA): ICD-10-CM

## 2024-04-29 PROBLEM — I31.39 PERICARDIAL EFFUSION: Status: RESOLVED | Noted: 2021-09-28 | Resolved: 2024-04-29

## 2024-04-29 PROCEDURE — 99214 OFFICE O/P EST MOD 30 MIN: CPT | Performed by: INTERNAL MEDICINE

## 2024-04-29 NOTE — LETTER
April 29, 2024     Patient: Hans Carreno  YOB: 1985  Date of Visit: 4/29/2024      To Whom it May Concern:    Hans Carreno is under my professional care. Hans was seen in my office on 4/29/2024.    A Stress test will be needed for Further evaluation.      Completion of paperwork will be determine of results of Stress test.       If you have any questions or concerns, please don't hesitate to call.         Sincerely,          Digna Frank MD

## 2024-04-29 NOTE — PROGRESS NOTES
Cardiology Follow Up    Hans Carreno  1985  [id number]  CARDIOVASC PHYSICIAN  [address] [address]  [phone]  [phone]    Discussion/Summary:  Paroxysmal SVT  TALIA on CPAP  Hypertension  Morbid obesity with BMI of 68 kg/m2  Chronic lymphedema    Patient presents for follow up and to fill up work paperwork    He was previously seen in cardiology office and was scheduled for nuclear stress test    Will follow up on stress test  Will fill out paperwork after stress testing  Otherwise, doing well. States no recurrence of SVT since increasing diltiazem    Recommend patient presents to the emergency room or call our office if he has any new/persistent or progressive symptoms.      Previous studies were reviewed.    Safety measures were reviewed.  Questions were entertained and answered.  Patient was advised to report any problems requiring medical attention.    Follow-up with PCP and appropriate specialist and lab work as discussed.    Return for follow up visit as scheduled or earlier, if needed.    Thank you for allowing me to participate in the care and evaluation of your patient.  Should you have any questions, please feel free to contact me.      History of Present Illness:     Hans Carreno is a 39 y.o. male who presents for follow up for   cardiovascular care.    He does yard work. Mows the yard. No chest pain or pressure  No shortness of breath  He works as an EMT. He works with [employer]. Works two full time jobs  He is planning to join the [employer] [facility]. States can easily climb 2 or more flight of stairs with no difficulty or symptoms    He was previously seen in cardiology office and was scheduled for nuclear stress test. Unfortunately has not been able to complete it as yet    Otherwise, doing well. States no recurrence of SVT since increasing diltiazem    Denies chest pain, chest pressure, shortness of breath, dizziness, lightheadedness,  presyncope or syncope.  Denies orthopnea, PND or lower limb edema.      Family history:  Grandfather had MI in 64/65 years of age  No history of sudden death or premature CAD    Social History:  Tobacco: Denies  Alcohol: Social    Echo 2/9/24:      Technically difficult study.    Left Ventricle: Left ventricular cavity size is normal. Wall thickness is mildly increased. There is mild concentric hypertrophy. The left ventricular ejection fraction is 60%. Systolic function is normal. Although no diagnostic regional wall motion abnormality was identified, this possibility cannot be completely excluded on the basis of this study. Diastolic function is normal.    Right Ventricle: Right ventricular cavity size is normal. Systolic function is normal.    Tricuspid Valve: There is trace regurgitation. The right ventricular systolic pressure is normal.    Aorta: The aortic root is 4.0 cm. The ascending aorta is 3.9 cm. Both likely normal values given patient's large frame / BMI.      Ambulatory monitor:  [facility]     Event Recorder Results     Start date: 2/22/2024  End date: 3/7/2024  Duration: 14 days  Indication: Supraventricular tachycardia  Findings:  - Patient had a min HR of 44 bpm, max HR of 231 bpm, and avg HR of 82 bpm.  - Predominant underlying rhythm was Sinus Rhythm.  - 1 run of Ventricular Tachycardia occurred lasting 4 beats with a max rate of 231 bpm (avg 190 bpm).  - 8 Supraventricular Tachycardia runs occurred, the run with the fastest interval lasting 8 mins 0 secs with a max rate of 218 bpm, the longest lasting 15 mins 36 secs with an avg rate of 156 bpm. Supraventricular Tachycardia was detected within +/- 45 seconds of symptomatic patient event(s).  - Isolated SVEs were rare (<1.0%), SVE Couplets were rare (<1.0%), and SVE Triplets were rare (<1.0%).  - Isolated VEs were rare (<1.0%), VE Couplets were rare (<1.0%), and no VE Triplets were present. Ventricular Bigeminy was  present.  Symptoms: Patient's complaints of fluttering/racing had underlying sinus rhythm (87 bpm, 82 bpm, 75 bpm) or sinus tachycardia (101 bpm).    Patient Active Problem List   Diagnosis    Cellulitis of left lower extremity    Asthma    SVT (supraventricular tachycardia)    GERD (gastroesophageal reflux disease)    Morbid obesity with BMI of 60.0-69.9, adult (HCC)    Leukocytosis    Obstructive sleep apnea syndrome    Hypertension    Morbid obesity (HCC)     Past Medical History:   Diagnosis Date    Asthma     CPAP (continuous positive airway pressure) dependence     Pericardial effusion     Sleep apnea     SVT (supraventricular tachycardia)      Social History     Socioeconomic History    Marital status: /Civil Union     Spouse name: Not on file    Number of children: Not on file    Years of education: Not on file    Highest education level: Not on file   Occupational History    Not on file   Tobacco Use    Smoking status: Never     Passive exposure: Never    Smokeless tobacco: Never   Vaping Use    Vaping status: Never Used   Substance and Sexual Activity    Alcohol use: Not Currently     Comment: rarely    Drug use: Never    Sexual activity: Not on file   Other Topics Concern    Not on file   Social History Narrative    Not on file     Social Determinants of Health     Financial Resource Strain: Not on file   Food Insecurity: Not on file   Transportation Needs: No Transportation Needs (9/30/2021)    PRAPARE - Transportation     Lack of Transportation (Medical): No     Lack of Transportation (Non-Medical): No   Physical Activity: Not on file   Stress: Not on file   Social Connections: Not on file   Intimate Partner Violence: Not on file   Housing Stability: Not on file      Family History   Problem Relation Age of Onset    Diabetes Mother     Heart failure Mother     Hypertension Father     COPD Father      Past Surgical History:   Procedure Laterality Date    CARDIAC SURGERY         Current Outpatient  "Medications:     albuterol (PROVENTIL HFA,VENTOLIN HFA) 90 mcg/act inhaler, Inhale 2 puffs every 6 (six) hours as needed for wheezing, Disp: 18 g, Rfl: 5    diltiazem (CARDIZEM CD) 360 MG 24 hr capsule, TAKE 1 CAPSULE BY MOUTH EVERY DAY, Disp: 90 capsule, Rfl: 0    famotidine (MM Famotidine) 20 mg tablet, Take 20 mg by mouth daily, Disp: , Rfl:     Fluticasone-Salmeterol (Advair Diskus) 250-50 mcg/dose inhaler, Inhale 1 puff daily, Disp: 60 blister, Rfl: 5    montelukast (SINGULAIR) 10 mg tablet, Take 1 tablet (10 mg total) by mouth daily at bedtime, Disp: 90 tablet, Rfl: 3  No Known Allergies    Labs:  Lab Results   Component Value Date    WBC 12.69 (H) 02/28/2024    HGB 14.6 02/28/2024    HCT 47.6 02/28/2024    MCV 88 02/28/2024     02/28/2024     Lab Results   Component Value Date    CALCIUM 8.9 02/28/2024    K 4.5 02/28/2024    CO2 29 02/28/2024     02/28/2024    BUN 10 02/28/2024    CREATININE 0.85 02/28/2024     No results found for: \"HGBA1C\"  No results found for: \"CHOL\"  Lab Results   Component Value Date    HDL 48 02/28/2024    HDL 53 08/24/2021     Lab Results   Component Value Date    LDLCALC 88 02/28/2024    LDLCALC 92 08/24/2021     Lab Results   Component Value Date    TRIG 62 02/28/2024    TRIG 82 08/24/2021     No results found for: \"CHOLHDL\"    Review of Systems:  Review of Systems   Constitutional: Negative.  Negative for activity change, appetite change, fatigue and fever.   Respiratory:  Negative for cough, chest tightness and shortness of breath.    Cardiovascular:  Negative for chest pain, palpitations and leg swelling.   Gastrointestinal:  Negative for nausea and vomiting.   Musculoskeletal:  Negative for arthralgias.   Skin:  Negative for color change and pallor.   Neurological:  Negative for dizziness, syncope and light-headedness.   Hematological:  Negative for adenopathy.   All other systems reviewed and are negative.      Physical Exam:  Physical Exam  Vitals and nursing note " reviewed.   Constitutional:       General: He is not in acute distress.     Appearance: Normal appearance. He is not ill-appearing or diaphoretic.   HENT:      Head: Normocephalic and atraumatic.   Eyes:      Extraocular Movements: Extraocular movements intact.   Cardiovascular:      Rate and Rhythm: Normal rate and regular rhythm.      Heart sounds: No murmur heard.     No friction rub. No gallop.   Pulmonary:      Effort: Pulmonary effort is normal. No respiratory distress.      Breath sounds: Normal breath sounds.   Abdominal:      General: There is no distension.      Palpations: Abdomen is soft.   Musculoskeletal:         General: No swelling. Normal range of motion.      Cervical back: Normal range of motion. No rigidity.   Skin:     General: Skin is warm and dry.      Capillary Refill: Capillary refill takes less than 2 seconds.      Coloration: Skin is not pale.   Neurological:      General: No focal deficit present.      Mental Status: He is alert and oriented to person, place, and time.      Cranial Nerves: No cranial nerve deficit.   Psychiatric:         Mood and Affect: Mood normal.         Behavior: Behavior normal.

## 2024-05-09 ENCOUNTER — HOSPITAL ENCOUNTER (OUTPATIENT)
Dept: NON INVASIVE DIAGNOSTICS | Facility: HOSPITAL | Age: 39
Discharge: HOME | End: 2024-05-09
Payer: COMMERCIAL

## 2024-05-09 ENCOUNTER — HOSPITAL ENCOUNTER (OUTPATIENT)
Dept: NUCLEAR MEDICINE | Facility: HOSPITAL | Age: 39
Discharge: HOME | End: 2024-05-09
Payer: COMMERCIAL

## 2024-05-09 VITALS — OXYGEN SATURATION: 97 % | SYSTOLIC BLOOD PRESSURE: 161 MMHG | DIASTOLIC BLOOD PRESSURE: 106 MMHG | HEART RATE: 67 BPM

## 2024-05-09 DIAGNOSIS — I47.29 NSVT (NONSUSTAINED VENTRICULAR TACHYCARDIA) (HCC): ICD-10-CM

## 2024-05-09 LAB
CHEST PAIN STATEMENT: NORMAL
MAX DIASTOLIC BP: 91 MMHG
MAX PREDICTED HEART RATE: 181 BPM
PROTOCOL NAME: NORMAL
RATE PRESSURE PRODUCT: NORMAL
REASON FOR TERMINATION: NORMAL
SL CV REST NUCLEAR ISOTOPE DOSE: 16.2 MCI
SL CV STRESS NUCLEAR ISOTOPE DOSE: 49 MCI
SL CV STRESS RECOVERY HR: 76 BPM
SL CV STRESS RECOVERY O2 SAT: 98 %
STRESS ANGINA INDEX: 0
STRESS BASELINE BP: NORMAL MMHG
STRESS BASELINE HR: 67 BPM
STRESS O2 SAT REST: 97 %
STRESS PEAK HR: 100 BPM
STRESS POST EXERCISE DUR MIN: 3 MIN
STRESS POST EXERCISE DUR SEC: 0 SEC
STRESS POST O2 SAT PEAK: 99 %
STRESS POST PEAK BP: 208 MMHG
STRESS POST PEAK HR: 100 BPM
STRESS POST PEAK SYSTOLIC BP: 208 MMHG
TARGET HR FORMULA: NORMAL
TEST INDICATION: NORMAL

## 2024-05-09 PROCEDURE — 78452 HT MUSCLE IMAGE SPECT MULT: CPT

## 2024-05-09 PROCEDURE — 78452 HT MUSCLE IMAGE SPECT MULT: CPT | Performed by: INTERNAL MEDICINE

## 2024-05-09 PROCEDURE — 93018 CV STRESS TEST I&R ONLY: CPT | Performed by: INTERNAL MEDICINE

## 2024-05-09 PROCEDURE — 93017 CV STRESS TEST TRACING ONLY: CPT

## 2024-05-09 PROCEDURE — A9502 TC99M TETROFOSMIN: HCPCS

## 2024-05-09 PROCEDURE — 93016 CV STRESS TEST SUPVJ ONLY: CPT | Performed by: INTERNAL MEDICINE

## 2024-05-09 RX ORDER — REGADENOSON 0.08 MG/ML
0.4 INJECTION, SOLUTION INTRAVENOUS ONCE
Status: COMPLETED | OUTPATIENT
Start: 2024-05-09 | End: 2024-05-09

## 2024-05-09 RX ADMIN — REGADENOSON 0.4 MG: 0.08 INJECTION, SOLUTION INTRAVENOUS at 09:35

## 2024-05-10 ENCOUNTER — TELEPHONE (OUTPATIENT)
Age: 39
End: 2024-05-10

## 2024-05-10 NOTE — TELEPHONE ENCOUNTER
Caller: Patient     Doctor: Silvano     Reason for call: PT stated that he had his stress test yesterday as to needing this done prior to pre-employment paperwork being filled out. PT would to know when his paperwork will be done?     Call back#: [phone]

## 2024-05-13 NOTE — TELEPHONE ENCOUNTER
Caller: Hans Carreno    Doctor: Monika    Reason for call: Pt called needing the work clearance form by end of day 5/14/24 if at all possible.  Stress Test on 5/9/24.  Please advise pt.    Call back#: [phone]

## 2024-05-16 ENCOUNTER — TELEPHONE (OUTPATIENT)
Dept: FAMILY MEDICINE CLINIC | Facility: CLINIC | Age: 39
End: 2024-05-16

## 2024-05-16 DIAGNOSIS — J45.909 UNCOMPLICATED ASTHMA, UNSPECIFIED ASTHMA SEVERITY, UNSPECIFIED WHETHER PERSISTENT: ICD-10-CM

## 2024-05-16 DIAGNOSIS — I10 PRIMARY HYPERTENSION: ICD-10-CM

## 2024-05-16 DIAGNOSIS — E66.01 MORBID OBESITY WITH BMI OF 60.0-69.9, ADULT (HCC): ICD-10-CM

## 2024-05-16 DIAGNOSIS — I47.10 SVT (SUPRAVENTRICULAR TACHYCARDIA): Primary | ICD-10-CM

## 2024-05-16 DIAGNOSIS — E66.01 MORBID OBESITY (HCC): ICD-10-CM

## 2024-05-16 NOTE — TELEPHONE ENCOUNTER
Patient came in to  paperwork that our provider was unable to complete for him. He was very upset and told me I was a horrible person his voice was raised. After a few more minutes of his yelling Diane pulled him outside and was able to calm him down. She was able to keep him calm  and Shon tried to get him to the proper place to get his paperwork filled out. The patient left after an hour still trying to find where he could take his paperwork to be completed. He was not as upset as when he came in and thanked us for trying to help him with all of this. He will call his HR dept for his employer to see where he can go to complete the paperwork.

## 2024-05-16 NOTE — TELEPHONE ENCOUNTER
When Pt was elevated in the office, I was able to ask him to come outside with me so we could talk.  He calmed down immediately and I validated his frustration.  I further tried to explain why we were referring him elsewhere as we do not have the equipment to evaluate some of the measures he was to be evaluated on.  Our provider spoke to him and we all tried calling around for him.  Found out that the functional capacity eval is not what he needs as it's $1200-$1600 out of pocket up front (no covered by insurance).  I was told that is usually for Pt's seeking disability after an injury or for Pt's trying to return to work after an injury w/restrictions.  I explained that this was for employment & I was told usually an employer HR will direct prospective employee where to go.  All was explained to Pt & he left in a much better place.  He will let us know how he makes out.

## 2024-05-28 ENCOUNTER — TELEPHONE (OUTPATIENT)
Dept: CARDIOLOGY CLINIC | Facility: CLINIC | Age: 39
End: 2024-05-28

## 2024-05-28 NOTE — TELEPHONE ENCOUNTER
Spoke with pt. Patient verbally understood the result of his NM myocardial perfusion spect (rx stress and/or rest)

## 2024-05-28 NOTE — TELEPHONE ENCOUNTER
----- Message from Magalis Dickerson PA-C sent at 5/28/2024 10:03 AM EDT -----  Pls call stress test was good

## 2024-05-29 DIAGNOSIS — I47.10 SVT (SUPRAVENTRICULAR TACHYCARDIA): ICD-10-CM

## 2024-05-29 DIAGNOSIS — J45.40 MODERATE PERSISTENT ASTHMA WITHOUT COMPLICATION: ICD-10-CM

## 2024-05-30 RX ORDER — MONTELUKAST SODIUM 10 MG/1
10 TABLET ORAL
Qty: 90 TABLET | Refills: 1 | Status: SHIPPED | OUTPATIENT
Start: 2024-05-30

## 2024-05-30 RX ORDER — FLUTICASONE PROPIONATE AND SALMETEROL 250; 50 UG/1; UG/1
1 POWDER RESPIRATORY (INHALATION) DAILY
Qty: 60 BLISTER | Refills: 5 | Status: SHIPPED | OUTPATIENT
Start: 2024-05-30

## 2024-05-31 ENCOUNTER — TELEPHONE (OUTPATIENT)
Age: 39
End: 2024-05-31

## 2024-05-31 RX ORDER — DILTIAZEM HYDROCHLORIDE 360 MG/1
360 CAPSULE, EXTENDED RELEASE ORAL DAILY
Qty: 90 CAPSULE | Refills: 1 | Status: SHIPPED | OUTPATIENT
Start: 2024-05-31 | End: 2024-06-04

## 2024-05-31 NOTE — TELEPHONE ENCOUNTER
Justine from [facility] called and was asking about the directions for the Advair. She said it is usually 1 puff twice daily but the pts is 1 puff 1 a day. She wanted to know if this is correct? She said if not she would need a new rx sent thru for the correct dosage. Her phone number is [phone]

## 2024-06-03 ENCOUNTER — TELEPHONE (OUTPATIENT)
Dept: CARDIOLOGY CLINIC | Facility: CLINIC | Age: 39
End: 2024-06-03

## 2024-06-03 NOTE — TELEPHONE ENCOUNTER
Call transferred from call center.    Pharmacist wanted to make us aware that medication Diltiazem is still not available.    She verified in her system what other alternatives are available through them and was advised it is medication Tiazac.   Generic of Tiazac is Diltiazem.     Pharmacist stated they can not fill without a new prescription.    Please advise if you would like to prescribe Tiazac 360 mg tablet or use another alternate medication.

## 2024-06-04 DIAGNOSIS — I47.10 SVT (SUPRAVENTRICULAR TACHYCARDIA): Primary | ICD-10-CM

## 2024-06-04 RX ORDER — DILTIAZEM HYDROCHLORIDE 360 MG/1
360 CAPSULE, EXTENDED RELEASE ORAL DAILY
Qty: 90 CAPSULE | Refills: 3 | Status: SHIPPED | OUTPATIENT
Start: 2024-06-04

## 2024-06-04 NOTE — TELEPHONE ENCOUNTER
Caller: Justine @[facility]    Doctor: Monika    Reason for call: Pharmacy following up on 1st call.  Cardizem is still on backorder & wonders if a script for Tiazac 360mg can be sent to replace ?  Or if they should continue waiting for the Cardizem?    Call back#: [phone]

## 2024-06-17 ENCOUNTER — TELEPHONE (OUTPATIENT)
Dept: BARIATRICS | Facility: CLINIC | Age: 39
End: 2024-06-17

## 2024-06-17 NOTE — TELEPHONE ENCOUNTER
Pt didn't respond via [employer], therefore left message on  today to advise his appt on 6/19 gabriel/Bill was cancelled.  She is no longer with the practice. Asked that he call us back to r/s

## 2024-06-27 ENCOUNTER — OFFICE VISIT (OUTPATIENT)
Dept: CARDIOLOGY CLINIC | Facility: CLINIC | Age: 39
End: 2024-06-27
Payer: COMMERCIAL

## 2024-06-27 VITALS
DIASTOLIC BLOOD PRESSURE: 90 MMHG | HEART RATE: 100 BPM | HEIGHT: 69 IN | SYSTOLIC BLOOD PRESSURE: 160 MMHG | WEIGHT: 315 LBS | OXYGEN SATURATION: 93 % | BODY MASS INDEX: 46.65 KG/M2 | RESPIRATION RATE: 16 BRPM

## 2024-06-27 DIAGNOSIS — I10 PRIMARY HYPERTENSION: ICD-10-CM

## 2024-06-27 DIAGNOSIS — I47.10 SVT (SUPRAVENTRICULAR TACHYCARDIA): Primary | ICD-10-CM

## 2024-06-27 PROCEDURE — 99214 OFFICE O/P EST MOD 30 MIN: CPT

## 2024-06-27 NOTE — PROGRESS NOTES
[facility]  [address] [id number]  Cardiology Office Note    Hans Carreno 39 y.o. male MRN: [id number]    06/27/24          Assessment/Plan:  1. PSVT  Patient notes palpitations. Completed 2 week event monitor, awaiting report  Continue Cardizem  mg daily; reassess once event monitor results are available.      2. HTN  Blood pressure elevated at today's visit, normotensive on previous visits  Continue Cardizem  mg daily  Maintain ambulatory blood pressure log and bring to next visit.     3. TALIA  Encouraged to use CPAP consistently      Follow up: 4 months or sooner as needed  All questions and concerns addressed.  Patient was advised to report any problems requiring medical attention.          1. SVT (supraventricular tachycardia)        2. Primary hypertension            HPI: Hans Carreno is a 39 y.o. year old male with PMH of SVT, TALAI, hypertension who presents for follow-up.     Patient notes that he feels more lethargic since increasing the cardizem. Patient has appointment with EP in August.     Patient prescribed CPAP-only using once or twice monthly.     Patient denies any chest pain, shortness of breath, palpitations, or lower extremity edema.    BP elevated at today's visit. 158/90 on recheck.  Patient noted to be normotensive on previous visits. Patient is able to check BP at home.      Patient was instructed to call the office or seek medical attention if any significant chest pain, shortness of breath, palpitations, or lower extremity swelling develop. All medications reviewed and patient is tolerating medications without side effects.       Social history:   Patient denies tobacco, significant alcohol, or recreational drug use.          Patient Active Problem List   Diagnosis    Cellulitis of left lower extremity    Asthma    SVT (supraventricular tachycardia)    GERD (gastroesophageal reflux disease)    Morbid obesity with BMI of 60.0-69.9, adult  (HCC)    Leukocytosis    Obstructive sleep apnea syndrome    Hypertension    Morbid obesity (HCC)       No Known Allergies      Current Outpatient Medications:     albuterol (PROVENTIL HFA,VENTOLIN HFA) 90 mcg/act inhaler, Inhale 2 puffs every 6 (six) hours as needed for wheezing, Disp: 18 g, Rfl: 5    diltiazem (TIAZAC) 360 MG 24 hr capsule, Take 1 capsule (360 mg total) by mouth daily, Disp: 90 capsule, Rfl: 3    famotidine (MM Famotidine) 20 mg tablet, Take 20 mg by mouth daily, Disp: , Rfl:     Fluticasone-Salmeterol (Advair Diskus) 250-50 mcg/dose inhaler, Inhale 1 puff daily, Disp: 60 blister, Rfl: 5    montelukast (SINGULAIR) 10 mg tablet, Take 1 tablet (10 mg total) by mouth daily at bedtime, Disp: 90 tablet, Rfl: 1    Past Medical History:   Diagnosis Date    Asthma     CPAP (continuous positive airway pressure) dependence     Pericardial effusion     Sleep apnea     SVT (supraventricular tachycardia)        Family History   Problem Relation Age of Onset    Diabetes Mother     Heart failure Mother     Hypertension Father     COPD Father        Past Surgical History:   Procedure Laterality Date    CARDIAC SURGERY         Social History     Socioeconomic History    Marital status: /Civil Union     Spouse name: Not on file    Number of children: Not on file    Years of education: Not on file    Highest education level: Not on file   Occupational History    Not on file   Tobacco Use    Smoking status: Never     Passive exposure: Never    Smokeless tobacco: Never   Vaping Use    Vaping status: Never Used   Substance and Sexual Activity    Alcohol use: Not Currently     Comment: rarely    Drug use: Never    Sexual activity: Not on file   Other Topics Concern    Not on file   Social History Narrative    Not on file     Social Determinants of Health     Financial Resource Strain: Not on file   Food Insecurity: Not on file   Transportation Needs: No Transportation Needs (9/30/2021)    PRAPARE - Transportation  "    Lack of Transportation (Medical): No     Lack of Transportation (Non-Medical): No   Physical Activity: Not on file   Stress: Not on file   Social Connections: Not on file   Intimate Partner Violence: Not on file   Housing Stability: Not on file       Review of symptoms:   Review of Systems   Constitutional:  Positive for fatigue. Negative for chills, diaphoresis and fever.   Respiratory:  Negative for cough, chest tightness and shortness of breath.    Cardiovascular:  Negative for chest pain, palpitations and leg swelling.   Gastrointestinal:  Negative for abdominal distention, blood in stool, nausea and vomiting.   Genitourinary:  Negative for difficulty urinating.   Musculoskeletal:  Negative for arthralgias and back pain.   Neurological:  Negative for dizziness, syncope, light-headedness and headaches.   Psychiatric/Behavioral:  Negative for agitation and confusion. The patient is not nervous/anxious.         Vitals: /90 (BP Location: Left arm, Patient Position: Sitting, Cuff Size: Standard)   Pulse 100   Resp 16   Ht 5' 9\" (1.753 m)   Wt (!) 222 kg (490 lb)   SpO2 93%   BMI 72.36 kg/m²         Physical Exam:     Physical Exam  Vitals and nursing note reviewed.   Constitutional:       General: He is not in acute distress.     Appearance: He is well-developed. He is obese.   HENT:      Head: Normocephalic and atraumatic.   Eyes:      Conjunctiva/sclera: Conjunctivae normal.   Neck:      Vascular: No carotid bruit.   Cardiovascular:      Rate and Rhythm: Normal rate and regular rhythm.      Heart sounds: Normal heart sounds. No murmur heard.  Pulmonary:      Effort: Pulmonary effort is normal. No respiratory distress.      Breath sounds: Normal breath sounds.   Abdominal:      Palpations: Abdomen is soft.      Tenderness: There is no abdominal tenderness.   Musculoskeletal:         General: No swelling.      Cervical back: Neck supple.      Right lower leg: Edema (lymphedema, chronic) present.      " Left lower leg: Edema (lymphedema, chronic) present.   Skin:     General: Skin is warm and dry.      Capillary Refill: Capillary refill takes less than 2 seconds.   Neurological:      Mental Status: He is alert and oriented to person, place, and time.   Psychiatric:         Mood and Affect: Mood normal.            Thank you for allowing me to participate in the care and evaluation of your patient.  Should you have any questions, please feel free to contact me.

## 2024-08-14 ENCOUNTER — OFFICE VISIT (OUTPATIENT)
Dept: CARDIOLOGY CLINIC | Facility: CLINIC | Age: 39
End: 2024-08-14
Payer: COMMERCIAL

## 2024-08-14 VITALS
SYSTOLIC BLOOD PRESSURE: 118 MMHG | DIASTOLIC BLOOD PRESSURE: 70 MMHG | HEIGHT: 69 IN | HEART RATE: 72 BPM | WEIGHT: 315 LBS | BODY MASS INDEX: 46.65 KG/M2

## 2024-08-14 DIAGNOSIS — I47.10 SVT (SUPRAVENTRICULAR TACHYCARDIA): Primary | ICD-10-CM

## 2024-08-14 PROCEDURE — 93000 ELECTROCARDIOGRAM COMPLETE: CPT | Performed by: INTERNAL MEDICINE

## 2024-08-14 PROCEDURE — 99244 OFF/OP CNSLTJ NEW/EST MOD 40: CPT | Performed by: INTERNAL MEDICINE

## 2024-08-14 NOTE — PROGRESS NOTES
EPS Consultation/New Patient Evaluation - Hans Carreno 39 y.o. male MRN: [id number]       Referring:Dr. Frank    CC/HPI:   It was a pleasure to see Hans Carreno in our arrhythmia clinic at [facility]. As you know he is a 39 y.o. man with hypertension, obstructive sleep apnea on CPAP who presents to discuss management of SVT.    Patient presented to [facility] in February 2024 with palpitations.  He was found to be in SVT.  He is SVT resolved with IV bolus of Cardizem.  At the time he was taking his Cardizem every other day.  When having SVT episode hepalpitation in his neck lasting few seconds.  He had first episode of SVT in 2008 however was not diagnosed until 2010.    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  He was scheduled for nuclear stress test which was negative.  He had cardiac event monitor which showed 8 episodes of SVT lasting from 8 minutes to 15 minutes.  His heart rate increased up to 200 bpm.     With diltiazem he feels fatigue. He also notices episodes of palpitations. He has not had long episodes but would like to come off medications. He gained weight until March and then has been stable. He is following up with bariatric team in October to discuss options.     Past Medical History:  Past Medical History:   Diagnosis Date    Asthma     CPAP (continuous positive airway pressure) dependence     Pericardial effusion     Sleep apnea     SVT (supraventricular tachycardia)        Medications:      Current Outpatient Medications:     albuterol (PROVENTIL HFA,VENTOLIN HFA) 90 mcg/act inhaler, Inhale 2 puffs every 6 (six) hours as needed for wheezing, Disp: 18 g, Rfl: 5    diltiazem (TIAZAC) 360 MG 24 hr capsule, Take 1 capsule (360 mg total) by mouth daily, Disp: 90 capsule, Rfl: 3    famotidine (MM Famotidine) 20 mg tablet, Take 20 mg by mouth daily, Disp: , Rfl:     Fluticasone-Salmeterol (Advair Diskus) 250-50 mcg/dose  inhaler, Inhale 1 puff daily, Disp: 60 blister, Rfl: 5    montelukast (SINGULAIR) 10 mg tablet, Take 1 tablet (10 mg total) by mouth daily at bedtime, Disp: 90 tablet, Rfl: 1     Family History   Problem Relation Age of Onset    Diabetes Mother     Heart failure Mother     Hypertension Father     COPD Father      Social History     Socioeconomic History    Marital status: /Civil Union     Spouse name: Not on file    Number of children: Not on file    Years of education: Not on file    Highest education level: Not on file   Occupational History    Not on file   Tobacco Use    Smoking status: Never     Passive exposure: Never    Smokeless tobacco: Never   Vaping Use    Vaping status: Never Used   Substance and Sexual Activity    Alcohol use: Not Currently     Comment: rarely    Drug use: Never    Sexual activity: Not on file   Other Topics Concern    Not on file   Social History Narrative    Not on file     Social Determinants of Health     Financial Resource Strain: Not on file   Food Insecurity: Not on file   Transportation Needs: No Transportation Needs (9/30/2021)    PRAPARE - Transportation     Lack of Transportation (Medical): No     Lack of Transportation (Non-Medical): No   Physical Activity: Not on file   Stress: Not on file   Social Connections: Not on file   Intimate Partner Violence: Not on file   Housing Stability: Not on file     Social History     Tobacco Use   Smoking Status Never    Passive exposure: Never   Smokeless Tobacco Never     Social History     Substance and Sexual Activity   Alcohol Use Not Currently    Comment: rarely       Review of Systems   Constitutional: Positive for malaise/fatigue and weight gain. Negative for chills and fever.   HENT: Negative.     Eyes:  Negative for blurred vision and double vision.   Cardiovascular:  Positive for palpitations. Negative for chest pain, dyspnea on exertion, leg swelling, near-syncope, orthopnea, paroxysmal nocturnal dyspnea and syncope.  "  Respiratory:  Negative for cough and sputum production.    Endocrine: Negative.    Skin: Negative.  Negative for rash.   Musculoskeletal:  Positive for joint pain. Negative for arthritis.   Gastrointestinal:  Negative for abdominal pain, nausea and vomiting.   Genitourinary: Negative.    Neurological: Negative.  Negative for dizziness and light-headedness.   Psychiatric/Behavioral: Negative.  The patient is not nervous/anxious.         Objective:     Vitals: Blood pressure 118/70, pulse 72, height 5' 9\" (1.753 m), weight (!) 218 kg (479 lb 12.8 oz)., Body mass index is 70.85 kg/m².,        Physical Exam:    GEN: Hans Carreno appears well, alert and oriented x 3, pleasant and cooperative; morbidly obese    HEENT: pupils equal, round, and reactive to light; extraocular muscles intact  NECK: supple, no carotid bruits   HEART: regular rhythm, normal S1 and S2, no murmurs, clicks, gallops or rubs   LUNGS: clear to auscultation bilaterally; no wheezes, rales, or rhonchi   ABDOMEN: normal bowel sounds, soft, no tenderness, no distention  EXTREMITIES: peripheral pulses normal; no clubbing, cyanosis, or edema  NEURO: no focal findings   SKIN: normal without suspicious lesions on exposed skin      Labs & Results:  Below is the patient's most recent value for Albumin, ALT, AST, BUN, Calcium, Chloride, Cholesterol, CO2, Creatinine, GFR, Glucose, HDL, Hematocrit, Hemoglobin, Hemoglobin A1C, LDL, Magnesium, Phosphorus, Platelets, Potassium, PSA, Sodium, Triglycerides, and WBC.   Lab Results   Component Value Date    ALT 19 02/28/2024    AST 17 02/28/2024    BUN 10 02/28/2024    CALCIUM 8.9 02/28/2024     02/28/2024    CO2 29 02/28/2024    CREATININE 0.85 02/28/2024    HDL 48 02/28/2024    HCT 47.6 02/28/2024    HGB 14.6 02/28/2024    MG 1.9 02/09/2024    PHOS 3.4 02/16/2023     02/28/2024    K 4.5 02/28/2024    TRIG 62 02/28/2024    WBC 12.69 (H) 02/28/2024     Note: for a comprehensive list of the patient's lab " results, access the Results Review activity.          Cardiac testing:     I personally reviewed the ECG performed in the clinic on 08/14/24. It reveals normal sinus rhythm.     Echocardiograms:  No results found for this or any previous visit.    No results found for this or any previous visit.      Catheterizations:   No results found for this or any previous visit.      Stress Tests:  No results found for this or any previous visit.      Holter monitor -   No results found for this or any previous visit.    No results found for this or any previous visit.        ASSESSMENT/PLAN:  SVT  Has been diagnosed with SVT since 2010  Was having infrequent episodes  Now episodes are more frequent with last episode of SVT requiring hospital visit occurred in Feb, 2024  Cardiac event monitor showed eight episodes of SVT with heart rate upto 200 bpm  Currently on diltaizem 360 mg daily   Continues to have episodes and feel fatigue; unable to loose weight  He will benefit from ablation procedure though his weight is limitation as EP table   Recommend coming down below 450 lb before setting up ablation  In the meantime, can consider Tikosyn if having symptoms with diltiazem  Patient will think about the alternative rhythm medications  Continue diltiazem for now  Morbid obesity  Has been seen by bariatric surgery  Has an appointment coming up in October  If he is able to obtain GLP-1 agonist, then weight loss can occur and he can set-up SVT ablation   Will send the team a message  TALIA  Advised compliance with CPAP  HTN  Normotensive in the office  Maintained on diltiazem

## 2024-10-03 ENCOUNTER — OFFICE VISIT (OUTPATIENT)
Dept: BARIATRICS | Facility: CLINIC | Age: 39
End: 2024-10-03
Payer: COMMERCIAL

## 2024-10-03 ENCOUNTER — TELEPHONE (OUTPATIENT)
Dept: BARIATRICS | Facility: CLINIC | Age: 39
End: 2024-10-03

## 2024-10-03 VITALS
HEIGHT: 69 IN | WEIGHT: 315 LBS | RESPIRATION RATE: 21 BRPM | HEART RATE: 86 BPM | BODY MASS INDEX: 46.65 KG/M2 | DIASTOLIC BLOOD PRESSURE: 84 MMHG | SYSTOLIC BLOOD PRESSURE: 136 MMHG | OXYGEN SATURATION: 95 % | TEMPERATURE: 98.4 F

## 2024-10-03 DIAGNOSIS — I47.10 SVT (SUPRAVENTRICULAR TACHYCARDIA) (HCC): ICD-10-CM

## 2024-10-03 DIAGNOSIS — G47.33 OBSTRUCTIVE SLEEP APNEA SYNDROME: ICD-10-CM

## 2024-10-03 DIAGNOSIS — E66.01 MORBID OBESITY (HCC): Primary | ICD-10-CM

## 2024-10-03 PROCEDURE — 99214 OFFICE O/P EST MOD 30 MIN: CPT | Performed by: PHYSICIAN ASSISTANT

## 2024-10-03 RX ORDER — TIRZEPATIDE 2.5 MG/.5ML
2.5 INJECTION, SOLUTION SUBCUTANEOUS WEEKLY
Qty: 2 ML | Refills: 0 | Status: SHIPPED | OUTPATIENT
Start: 2024-10-03 | End: 2024-10-31

## 2024-10-03 NOTE — TELEPHONE ENCOUNTER
PA for Zepbound 2.5mg SUBMITTED     via    [x]CMM-KEY: [id number]  []Surescripts-Case ID #   []Availity-Auth ID # NDC #   []Faxed to plan   []Other website   []Phone call Case ID #     Office notes sent, clinical questions answered. Awaiting determination    Turnaround time for your insurance to make a decision on your Prior Authorization can take 7-21 business days.

## 2024-10-03 NOTE — ASSESSMENT & PLAN NOTE
-On Diltiazem  -not a candidate for sympathomimetics  -unable to have ablation at this time due to his current BMI

## 2024-10-03 NOTE — PROGRESS NOTES
Assessment/Plan:    Morbid obesity (HCC)  -Discussed options of HealthyCORE-Intensive Lifestyle Intervention Program, Very Low Calorie Diet-VLCD, Conservative Program, Yudi-En-Y Gastric Bypass, and Vertical Sleeve Gastrectomy and the role of weight loss medications.  -Initial weight loss goal of 5-10% weight loss for improved health  -Screening labs: reviewed TSH, CMP, Lipid Panel   -Patient is interested in pursuing conservative  -Calorie goals, sample menu, portion size guidelines, and food logging reviewed with the patient.    -Not currently interested in bariatric surgery  -Patient interested in AOMS. Discussed the importance of consistent dietary and lfestyle changes for long term success. Has interest in injectables. Denies personal hx pancreatitis or family hx MEN2 or MTC. SE profile discussed. Will plan to start Zepbound  -medication agreement signed    SVT (supraventricular tachycardia)  -On Diltiazem  -not a candidate for sympathomimetics  -unable to have ablation at this time due to his current BMI    Obstructive sleep apnea syndrome  -encouraged compliance with CPAP  -can improve with weight loss    Goals:    Food log (ie.) [email or web address],sparkpeople.com,loseit.com,calorieking.com,etc.   No sugary beverages. At least 64oz of water daily.  Increase physical activity by 10 minutes daily. Gradually increase physical activity to a goal of 5 days per week for 30 minutes of MODERATE intensity PLUS 2 days per week of FULL BODY resistance training  2500 Calories per day  5-10 servings of fruits and vegetables per day  25-35 grams of dietary fiber per day      Visit [email or web address] management for further information/injection instructions. Please eat small frequent meals to help reduce nausea. Lemon water and saltine crackers may help with this. If you experience fever, nausea/vomiting, and pain radiating to your back this may be a sign of pancreatitis. Please have ER evaluation with this occurs.      Follow up in  approximately  4 months  with Non-Surgical Physician/Advanced Practitioner and 8 weeks for nurse visit.     Diagnoses and all orders for this visit:    Morbid obesity (HCC)  -     tirzepatide (Zepbound) 2.5 mg/0.5 mL auto-injector; Inject 0.5 mL (2.5 mg total) under the skin once a week for 28 days    BMI 70 and over, adult (HCC)  -     Ambulatory Referral to Weight Management    Obstructive sleep apnea syndrome  -     tirzepatide (Zepbound) 2.5 mg/0.5 mL auto-injector; Inject 0.5 mL (2.5 mg total) under the skin once a week for 28 days    SVT (supraventricular tachycardia) ([facility])    Other orders  -     APPLE CIDER VINEGAR PO; Take by mouth          Subjective:   Chief Complaint   Patient presents with    Follow-up        Patient ID: Hans Carreno  is a 39 y.o. male with excess weight/obesity here to pursue weight managment.      HPI Patient presents for overdue follow up. Patient was last seen in 2022 by . Patient was enrolled in bariatric surgery pathway at that time. He was halted from the program for lack of follow up. Does not want to pursue surgery at this time     lbs    -Noticed weight gain more recently since increasing dose of Diltiazem. Has struggled with hsi weight since childhood    Hydration: water 64oz, peach sweetened iced tea half gallon, hot tea + half and half + sugar, OJ with breakfast  ETOH: denies  Exercise: denies  Occupation: paramedic - works 2 jobs, sometimes works HS  SLeep: 4-5 hours, sometimes wears CPAP about 50% of the time  Smoking: denies    B: oatmeal OR toast w./ butter  S: skips  L: skips sometimes, inconsistent. Turkey sandwich OR left overs from dinner  S: skips  D: pizza, spaghetti, [employer], gets take out while working IF home may have meatloaf + pasta or soup  S:  popcorn or chocolate OR PB    Wt Readings from Last 10 Encounters:   10/03/24 (!) 221 kg (486 lb 9.6 oz)   08/14/24 (!) 218 kg (479 lb 12.8 oz)   06/27/24 (!) 222 kg (490 lb)   05/07/24 (!) 214 kg  "(471 lb 9.6 oz)   04/29/24 (!) 215 kg (473 lb)   04/11/24 (!) 213 kg (469 lb)   03/12/24 (!) 213 kg (469 lb)   02/28/24 (!) 214 kg (471 lb)   02/09/24 (!) 201 kg (443 lb)   11/03/23 (!) 201 kg (443 lb 2 oz)        Colonoscopy: N/A    The following portions of the patient's history were reviewed and updated as appropriate: allergies, current medications, past family history, past medical history, past social history, past surgical history, and problem list.    Review of Systems   Constitutional:  Positive for fatigue.   Respiratory:  Positive for shortness of breath. Negative for cough.    Cardiovascular:  Positive for palpitations (hx SVT). Negative for chest pain.   Gastrointestinal:  Negative for abdominal pain, nausea and vomiting.   Genitourinary:  Negative for dysuria.   Musculoskeletal:  Positive for arthralgias.   Skin:  Negative for rash.   Psychiatric/Behavioral:  Negative for dysphoric mood.        Objective:    /84 (BP Location: Right arm, Patient Position: Sitting, Cuff Size: Large) Comment (BP Location): lower arm  Pulse 86   Temp 98.4 °F (36.9 °C) (Temporal)   Resp 21   Ht 5' 9\" (1.753 m) Comment: verbal  Wt (!) 221 kg (486 lb 9.6 oz)   SpO2 95%   BMI 71.86 kg/m²      Physical Exam  Vitals and nursing note reviewed.      Constitutional   General appearance: Abnormal.  well developed and morbidly obese. Acanthosis Nigricans noted on neck  Eyes No conjunctival pallor.   Ears, Nose, Mouth, and Throat Oral mucosa moist.   Pulmonary   Respiratory effort: No increased work of breathing or signs of respiratory distress.    Auscultation of lungs: Clear to auscultation, equal breath sounds bilaterally, no wheezes, no rales, no rhonci.    Cardiovascular   Auscultation of heart: Normal rate and rhythm, normal S1 and S2, without murmurs.    Examination of extremities for edema and/or varicosities: + edema bilateral LE  Abdomen   Abdomen: Abnormal.  The abdomen was obese. Bowel sounds were normal. The " abdomen was soft and nontender.   Musculoskeletal   Gait and station: Normal.    Psychiatric   Orientation to person, place and time: Normal.    Affect: appropriate

## 2024-10-03 NOTE — PATIENT INSTRUCTIONS
Goals:    Food log (ie.) www.[email or web address],StitcherAds.[email or web address],loseit.com,StyleSeek.com,etc.   No sugary beverages. At least 64oz of water daily.  Increase physical activity by 10 minutes daily. Gradually increase physical activity to a goal of 5 days per week for 30 minutes of MODERATE intensity PLUS 2 days per week of FULL BODY resistance training  2500 Calories per day  5-10 servings of fruits and vegetables per day  25-35 grams of dietary fiber per day      Visit [email or web address] for further information/injection instructions. Please eat small frequent meals to help reduce nausea. Lemon water and saltine crackers may help with this. If you experience fever, nausea/vomiting, and pain radiating to your back this may be a sign of pancreatitis. Please have ER evaluation with this occurs.

## 2024-10-03 NOTE — TELEPHONE ENCOUNTER
PA for Zepbound 2.5mg APPROVED     Date(s) approved 10/3//2024 - 10/3/2025    Case #    Patient advised by          []Inoappshart Message  [x]Phone call   []LMOM  []L/M to call office as no active Communication consent on file  []Unable to leave detailed message as VM not approved on Communication consent       Pharmacy advised by    [x]Fax  []Phone call    Approval letter scanned into Media Yes

## 2024-10-03 NOTE — ASSESSMENT & PLAN NOTE
-Discussed options of HealthyCORE-Intensive Lifestyle Intervention Program, Very Low Calorie Diet-VLCD, Conservative Program, Yudi-En-Y Gastric Bypass, and Vertical Sleeve Gastrectomy and the role of weight loss medications.  -Initial weight loss goal of 5-10% weight loss for improved health  -Screening labs: reviewed TSH, CMP, Lipid Panel   -Patient is interested in pursuing conservative  -Calorie goals, sample menu, portion size guidelines, and food logging reviewed with the patient.    -Not currently interested in bariatric surgery  -Patient interested in AOMS. Discussed the importance of consistent dietary and lfestyle changes for long term success. Has interest in injectables. Denies personal hx pancreatitis or family hx MEN2 or MTC. SE profile discussed. Will plan to start Zepbound  -medication agreement signed

## 2024-10-31 ENCOUNTER — OFFICE VISIT (OUTPATIENT)
Dept: CARDIOLOGY CLINIC | Facility: CLINIC | Age: 39
End: 2024-10-31
Payer: COMMERCIAL

## 2024-10-31 VITALS
OXYGEN SATURATION: 95 % | HEART RATE: 82 BPM | WEIGHT: 315 LBS | DIASTOLIC BLOOD PRESSURE: 78 MMHG | HEIGHT: 70 IN | SYSTOLIC BLOOD PRESSURE: 126 MMHG | RESPIRATION RATE: 16 BRPM | BODY MASS INDEX: 45.1 KG/M2

## 2024-10-31 DIAGNOSIS — E66.01 MORBID OBESITY (HCC): ICD-10-CM

## 2024-10-31 DIAGNOSIS — I47.10 SVT (SUPRAVENTRICULAR TACHYCARDIA) (HCC): Primary | ICD-10-CM

## 2024-10-31 DIAGNOSIS — I10 PRIMARY HYPERTENSION: ICD-10-CM

## 2024-10-31 DIAGNOSIS — E66.01 MORBID OBESITY (HCC): Primary | ICD-10-CM

## 2024-10-31 PROBLEM — D72.829 LEUKOCYTOSIS: Status: RESOLVED | Noted: 2021-09-29 | Resolved: 2024-10-31

## 2024-10-31 PROCEDURE — 99213 OFFICE O/P EST LOW 20 MIN: CPT | Performed by: INTERNAL MEDICINE

## 2024-10-31 RX ORDER — TIRZEPATIDE 5 MG/.5ML
5 INJECTION, SOLUTION SUBCUTANEOUS WEEKLY
Qty: 2 ML | Refills: 0 | Status: SHIPPED | OUTPATIENT
Start: 2024-10-31 | End: 2024-11-28

## 2024-10-31 NOTE — ASSESSMENT & PLAN NOTE
Patient is being followed by the medical weight loss clinic and is on Zepbound.  His weight is down.  Patient encouraged to continue to lose weight to reduce cardiovascular morbidity and mortality.    Follow-up in 1 year.  Follow-up with electrophysiology and primary care physician.

## 2024-10-31 NOTE — PROGRESS NOTES
[facility]  [address] [place] [address]  Cardiology Follow Up    Hans Carreno  1985  [id number]      Assessment & Plan  SVT (supraventricular tachycardia) (HCC)  Patient has longstanding history of SVT.  Patient has been evaluated by electrophysiology Dr. Monroy.  Patient is being considered for ablation when he is able to get his weight down below 450 pounds.  Presently he is on Cardizem.  He would like to get off Cardizem after ablation.  Presently he is okay staying on this medication.  He does understand vagal maneuvers.  Primary hypertension  Blood pressures are stable.  Importance of salt restriction to continue.  Morbid obesity (HCC)  Patient is being followed by the medical weight loss clinic and is on Zepbound.  His weight is down.  Patient encouraged to continue to lose weight to reduce cardiovascular morbidity and mortality.    Follow-up in 1 year.  Follow-up with electrophysiology and primary care physician.       Chief Complaint   Patient presents with    Follow-up       Interval History:   Patient denies any history of chest pain or shortness of breath.  Patient does have some leg edema with chronic venous insufficiency.  Patient has occasional episodes of palpitations lasting briefly and he does do vagal maneuvers which help.  Patient followed by medical weight loss clinic for obesity.  He is losing weight.    Patient Active Problem List   Diagnosis    Cellulitis of left lower extremity    Asthma    SVT (supraventricular tachycardia) ([facility])    GERD (gastroesophageal reflux disease)    Morbid obesity with BMI of 60.0-69.9, adult (HCC)    Leukocytosis    Obstructive sleep apnea syndrome    Hypertension    Morbid obesity (HCC)     Past Medical History:   Diagnosis Date    Asthma     CPAP (continuous positive airway pressure) dependence     Pericardial effusion     Sleep apnea     SVT (supraventricular tachycardia) ([facility])      Social History     Socioeconomic  History    Marital status: /Civil Union     Spouse name: Not on file    Number of children: Not on file    Years of education: Not on file    Highest education level: Not on file   Occupational History    Not on file   Tobacco Use    Smoking status: Never     Passive exposure: Never    Smokeless tobacco: Never   Vaping Use    Vaping status: Never Used   Substance and Sexual Activity    Alcohol use: Not Currently     Comment: rarely    Drug use: Never    Sexual activity: Not on file   Other Topics Concern    Not on file   Social History Narrative    Not on file     Social Determinants of Health     Financial Resource Strain: Not on file   Food Insecurity: Not on file   Transportation Needs: No Transportation Needs (9/30/2021)    PRAPARE - Transportation     Lack of Transportation (Medical): No     Lack of Transportation (Non-Medical): No   Physical Activity: Not on file   Stress: Not on file   Social Connections: Not on file   Intimate Partner Violence: Not on file   Housing Stability: Not on file      Family History   Problem Relation Age of Onset    Obesity Mother     Diabetes Mother     Heart failure Mother     Obesity Father     Hypertension Father     COPD Father     Obesity Sister     Obesity Brother     Diabetes Maternal Grandfather     Diabetes Paternal Grandmother     Diabetes Paternal Grandfather      Past Surgical History:   Procedure Laterality Date    CARDIAC SURGERY         Current Outpatient Medications:     albuterol (PROVENTIL HFA,VENTOLIN HFA) 90 mcg/act inhaler, Inhale 2 puffs every 6 (six) hours as needed for wheezing, Disp: 18 g, Rfl: 5    APPLE CIDER VINEGAR PO, Take by mouth, Disp: , Rfl:     diltiazem (TIAZAC) 360 MG 24 hr capsule, Take 1 capsule (360 mg total) by mouth daily, Disp: 90 capsule, Rfl: 3    famotidine (MM Famotidine) 20 mg tablet, Take 20 mg by mouth daily, Disp: , Rfl:     Fluticasone-Salmeterol (Advair Diskus) 250-50 mcg/dose inhaler, Inhale 1 puff daily, Disp: 60  blister, Rfl: 5    montelukast (SINGULAIR) 10 mg tablet, Take 1 tablet (10 mg total) by mouth daily at bedtime, Disp: 90 tablet, Rfl: 1    tirzepatide (Zepbound) 2.5 mg/0.5 mL auto-injector, Inject 0.5 mL (2.5 mg total) under the skin once a week for 28 days, Disp: 2 mL, Rfl: 0  No Known Allergies    Labs:  No visits with results within 2 Month(s) from this visit.   Latest known visit with results is:   Hospital Outpatient Visit on 05/09/2024   Component Date Value    Rest Nuclear Isotope Dose 05/09/2024 16.20     Stress Nuclear Isotope D* 05/09/2024 49.00     Baseline HR 05/09/2024 67     Baseline BP 05/09/2024 161/106     O2 sat rest 05/09/2024 97     Stress peak HR 05/09/2024 100     Post peak BP 05/09/2024 208     O2 sat peak 05/09/2024 99     Recovery HR 05/09/2024 76     O2 sat recovery 05/09/2024 98     Rate Pressure Product 05/09/2024 20,800.0     Angina Index 05/09/2024 0     Protocol Name 05/09/2024 LEXISCAN-SIT     Exercise duration (min) 05/09/2024 3     Exercise duration (sec) 05/09/2024 0     Post Peak Systolic BP 05/09/2024 208     Max Diastolic Bp 05/09/2024 91     Peak HR 05/09/2024 100     Max Predicted Heart Rate 05/09/2024 181     Reason for Termination 05/09/2024 Protocol Complete     Test Indication 05/09/2024 PSVT, NSVT     Target Hr Formular 05/09/2024 (220 - Age)*85%     Chest Pain Statement 05/09/2024 none      Imaging: No results found.    Review of Systems:  Review of Systems  REVIEW OF SYSTEMS:  Constitutional:  Denies fever or chills   Eyes:  Denies change in visual acuity   HENT:  Denies nasal congestion or sore throat   Respiratory:  Denies cough or shortness of breath   Cardiovascular:  edema   GI:  Denies abdominal pain, nausea, vomiting, bloody stools or diarrhea   :  Denies dysuria, frequency, difficulty in micturition and nocturia  Musculoskeletal:  Denies back pain or joint pain   Neurologic:  Denies headache, focal weakness or sensory changes   Endocrine:  Denies polyuria or  "polydipsia   Lymphatic:  Denies swollen glands   Psychiatric:  Denies depression or anxiety    Physical Exam:    /78 (BP Location: Left arm, Patient Position: Sitting, Cuff Size: Large)   Pulse 82   Resp 16   Ht 5' 10\" (1.778 m)   Wt (!) 212 kg (468 lb)   SpO2 95%   BMI 67.15 kg/m²     Physical Exam  PHYSICAL EXAM:  General:  Patient is not in acute distress   Head: Normocephalic, Atraumatic.  HEENT:  Both pupils normal-size atraumatic, normocephalic, nonicteric  Neck:  JVP not raised. Trachea central. No carotid bruit  Respiratory:  normal breath sounds no crackles. no rhonchi  Cardiovascular:  Regular rate and rhythm no S3 no murmurs  GI:  Abdomen soft nontender. No organomegaly.   Lymphatic:  No cervical or inguinal lymphadenopathy  Neurologic:  Patient is awake alert, oriented . Grossly nonfocal  Extremities reveal trace edema with venous insufficiency.      "

## 2024-10-31 NOTE — ASSESSMENT & PLAN NOTE
Patient has longstanding history of SVT.  Patient has been evaluated by electrophysiology Dr. Monroy.  Patient is being considered for ablation when he is able to get his weight down below 450 pounds.  Presently he is on Cardizem.  He would like to get off Cardizem after ablation.  Presently he is okay staying on this medication.  He does understand vagal maneuvers.

## 2024-11-19 ENCOUNTER — OFFICE VISIT (OUTPATIENT)
Dept: CARDIOLOGY CLINIC | Facility: CLINIC | Age: 39
End: 2024-11-19
Payer: COMMERCIAL

## 2024-11-19 VITALS — WEIGHT: 315 LBS | BODY MASS INDEX: 68.16 KG/M2 | HEART RATE: 70 BPM

## 2024-11-19 DIAGNOSIS — I47.10 SVT (SUPRAVENTRICULAR TACHYCARDIA) (HCC): Primary | ICD-10-CM

## 2024-11-19 PROCEDURE — 99215 OFFICE O/P EST HI 40 MIN: CPT | Performed by: INTERNAL MEDICINE

## 2024-11-19 NOTE — PROGRESS NOTES
EPS Follow-up Patient Evaluation - Hans Carreno 39 y.o. male MRN: [id number]       Referring:Dr. Frank    CC/HPI:   It was a pleasure to see Hans Carreno in our arrhythmia clinic at [facility]. As you know he is a 39 y.o. man with hypertension, obstructive sleep apnea on CPAP who presented in August 2024 to discuss management of SVT.    Patient presented to [facility] in February 2024 with palpitations.  He was found to be in SVT.  His SVT resolved with IV bolus of Cardizem.  At the time he was taking his Cardizem every other day.  When having SVT episode hepalpitation in his neck lasting few seconds.  He had first episode of SVT in 2008 however was not diagnosed until 2010.    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  He was scheduled for nuclear stress test which was negative.  He had cardiac event monitor which showed 8 episodes of SVT lasting from 8 minutes to 15 minutes.  His heart rate increased up to 200 bpm.     With diltiazem he feels fatigue. He also notices episodes of palpitations. He has not had long episodes but would like to come off medications. He gained weight until March and then has been stable. He is following up with bariatric team in October to discuss options.     Interval history:  Jazmine presents for a follow-up visit.  Since last visit he has been seen by bariatric surgery team.  He opted to proceed with GLP-1 agonist.  He has lost weight and continues to lose weight.  He is in 12-week program to lose weight with GLP-1 agonist.    He continues to feel palpitations maybe twice a month.  Otherwise he is denies any significant issues with palpitations, chest pain, dyspnea on exertion.  He does have chronic lower extremity edema.  He is compliant with diltiazem.    Past Medical History:  Past Medical History:   Diagnosis Date    Asthma     CPAP (continuous positive airway pressure) dependence     Pericardial effusion      Sleep apnea     SVT (supraventricular tachycardia) ([facility])        Medications:      Current Outpatient Medications:     albuterol (PROVENTIL HFA,VENTOLIN HFA) 90 mcg/act inhaler, Inhale 2 puffs every 6 (six) hours as needed for wheezing, Disp: 18 g, Rfl: 5    APPLE CIDER VINEGAR PO, Take by mouth, Disp: , Rfl:     diltiazem (TIAZAC) 360 MG 24 hr capsule, Take 1 capsule (360 mg total) by mouth daily, Disp: 90 capsule, Rfl: 3    famotidine (MM Famotidine) 20 mg tablet, Take 20 mg by mouth daily, Disp: , Rfl:     Fluticasone-Salmeterol (Advair Diskus) 250-50 mcg/dose inhaler, Inhale 1 puff daily, Disp: 60 blister, Rfl: 5    montelukast (SINGULAIR) 10 mg tablet, Take 1 tablet (10 mg total) by mouth daily at bedtime, Disp: 90 tablet, Rfl: 1    tirzepatide (Zepbound) 5 mg/0.5 mL auto-injector, Inject 0.5 mL (5 mg total) under the skin once a week for 28 days, Disp: 2 mL, Rfl: 0     Family History   Problem Relation Age of Onset    Obesity Mother     Diabetes Mother     Heart failure Mother     Obesity Father     Hypertension Father     COPD Father     Obesity Sister     Obesity Brother     Diabetes Maternal Grandfather     Diabetes Paternal Grandmother     Diabetes Paternal Grandfather      Social History     Socioeconomic History    Marital status: /Civil Union     Spouse name: Not on file    Number of children: Not on file    Years of education: Not on file    Highest education level: Not on file   Occupational History    Not on file   Tobacco Use    Smoking status: Never     Passive exposure: Never    Smokeless tobacco: Never   Vaping Use    Vaping status: Never Used   Substance and Sexual Activity    Alcohol use: Not Currently     Comment: rarely    Drug use: Never    Sexual activity: Not on file   Other Topics Concern    Not on file   Social History Narrative    Not on file     Social Drivers of Health     Financial Resource Strain: Not on file   Food Insecurity: Not on file   Transportation Needs: No  Transportation Needs (9/30/2021)    PRAPARE - Transportation     Lack of Transportation (Medical): No     Lack of Transportation (Non-Medical): No   Physical Activity: Not on file   Stress: Not on file   Social Connections: Not on file   Intimate Partner Violence: Not on file   Housing Stability: Not on file     Social History     Tobacco Use   Smoking Status Never    Passive exposure: Never   Smokeless Tobacco Never     Social History     Substance and Sexual Activity   Alcohol Use Not Currently    Comment: rarely       Review of Systems   Constitutional: Positive for malaise/fatigue and weight loss (Intentional). Negative for chills, fever and weight gain.   HENT: Negative.     Eyes:  Negative for blurred vision and double vision.   Cardiovascular:  Positive for palpitations. Negative for chest pain, dyspnea on exertion, leg swelling, near-syncope, orthopnea, paroxysmal nocturnal dyspnea and syncope.   Respiratory:  Negative for cough and sputum production.    Endocrine: Negative.    Skin: Negative.  Negative for rash.   Musculoskeletal:  Positive for joint pain. Negative for arthritis.   Gastrointestinal:  Negative for abdominal pain, nausea and vomiting.   Genitourinary: Negative.    Neurological: Negative.  Negative for dizziness and light-headedness.   Psychiatric/Behavioral: Negative.  The patient is not nervous/anxious.         Objective:     Vitals: Pulse 70, weight (!) 215 kg (475 lb)., Body mass index is 68.16 kg/m².,        Physical Exam:    GEN: Hans Carreno appears well, alert and oriented x 3, pleasant and cooperative; morbidly obese    HEENT: pupils equal, round, and reactive to light; extraocular muscles intact  NECK: supple, no carotid bruits   HEART: regular rhythm, normal S1 and S2, no murmurs, clicks, gallops or rubs   LUNGS: clear to auscultation bilaterally; no wheezes, rales, or rhonchi   ABDOMEN: normal bowel sounds, soft, no tenderness, no distention  EXTREMITIES: peripheral pulses normal;  no clubbing, cyanosis, or edema  NEURO: no focal findings   SKIN: normal without suspicious lesions on exposed skin      Labs & Results:  Below is the patient's most recent value for Albumin, ALT, AST, BUN, Calcium, Chloride, Cholesterol, CO2, Creatinine, GFR, Glucose, HDL, Hematocrit, Hemoglobin, Hemoglobin A1C, LDL, Magnesium, Phosphorus, Platelets, Potassium, PSA, Sodium, Triglycerides, and WBC.   Lab Results   Component Value Date    ALT 19 02/28/2024    AST 17 02/28/2024    BUN 10 02/28/2024    CALCIUM 8.9 02/28/2024     02/28/2024    CO2 29 02/28/2024    CREATININE 0.85 02/28/2024    HDL 48 02/28/2024    HCT 47.6 02/28/2024    HGB 14.6 02/28/2024    MG 1.9 02/09/2024    PHOS 3.4 02/16/2023     02/28/2024    K 4.5 02/28/2024    TRIG 62 02/28/2024    WBC 12.69 (H) 02/28/2024     Note: for a comprehensive list of the patient's lab results, access the Results Review activity.          Cardiac testing:     I personally reviewed the ECG performed in the clinic on 11/21/24. It reveals normal sinus rhythm.     Echocardiograms:  No results found for this or any previous visit.    No results found for this or any previous visit.      Catheterizations:   No results found for this or any previous visit.      Stress Tests:  No results found for this or any previous visit.      Holter monitor -   No results found for this or any previous visit.    No results found for this or any previous visit.        ASSESSMENT/PLAN:  SVT  Has been diagnosed with SVT since 2010  Was having infrequent episodes  Now episodes are more frequent with last episode of SVT requiring hospital visit occurred in Feb, 2024  Cardiac event monitor showed eight episodes of SVT with heart rate upto 200 bpm  Currently on diltaizem 360 mg daily   Continues to have episodes and feel fatigue; unable to loose weight  He will benefit from ablation procedure though his weight is limitation as EP table   Recommend coming down below 450 lb before  setting up ablation  In the meantime, can consider Tikosyn if having symptoms with diltiazem  Started to loose weight   Will wait for 12/3 weight loss clinic visit to assess his weight and set-up SVT ablation afterwards   Continue diltiazem for now  Morbid obesity  Has been seen by bariatric surgery  Has an appointment coming up in October  Started on GLP1- agonist   He has lost weight now around 457   TALIA  Advised compliance with CPAP  HTN  Normotensive in the office  Maintained on diltiazem

## 2024-11-21 PROCEDURE — 93000 ELECTROCARDIOGRAM COMPLETE: CPT | Performed by: INTERNAL MEDICINE

## 2024-11-27 DIAGNOSIS — E66.01 MORBID OBESITY (HCC): ICD-10-CM

## 2024-11-29 RX ORDER — TIRZEPATIDE 5 MG/.5ML
5 INJECTION, SOLUTION SUBCUTANEOUS WEEKLY
Qty: 2 ML | Refills: 0 | Status: SHIPPED | OUTPATIENT
Start: 2024-11-29 | End: 2024-12-03 | Stop reason: SDUPTHER

## 2024-12-03 ENCOUNTER — CLINICAL SUPPORT (OUTPATIENT)
Dept: BARIATRICS | Facility: CLINIC | Age: 39
End: 2024-12-03

## 2024-12-03 VITALS
BODY MASS INDEX: 45.1 KG/M2 | HEIGHT: 70 IN | DIASTOLIC BLOOD PRESSURE: 82 MMHG | RESPIRATION RATE: 16 BRPM | WEIGHT: 315 LBS | SYSTOLIC BLOOD PRESSURE: 136 MMHG | HEART RATE: 78 BPM | OXYGEN SATURATION: 96 % | TEMPERATURE: 98.1 F

## 2024-12-03 DIAGNOSIS — R63.5 ABNORMAL WEIGHT GAIN: Primary | ICD-10-CM

## 2024-12-03 DIAGNOSIS — E66.01 MORBID OBESITY (HCC): ICD-10-CM

## 2024-12-03 PROCEDURE — RECHECK

## 2024-12-03 RX ORDER — TIRZEPATIDE 5 MG/.5ML
5 INJECTION, SOLUTION SUBCUTANEOUS WEEKLY
Qty: 2 ML | Refills: 2 | Status: SHIPPED | OUTPATIENT
Start: 2024-12-03

## 2024-12-03 NOTE — PROGRESS NOTES
Patient last visit weight:  486 lb  Patient current visit weight: 453 lb      If you are taking an injectable medication,  are you experiencing any of the following symptoms:  Bloating: no  Nausea: no  Vomiting: no  Constipation: no  Diarrhea: no  SPECIFY INJECTABLE MEDICATION AND CURRENT DOSAGE: Zepbound 5mg      Vitals:    Is BP less than 100/60? no  Is BP greater than 140/90? no  Is HR greater than 100? no  **If yes to any of the above, have patient relax and repeat in 5-10 minutes**

## 2024-12-06 ENCOUNTER — TELEPHONE (OUTPATIENT)
Dept: ENDOCRINOLOGY | Facility: CLINIC | Age: 39
End: 2024-12-06

## 2024-12-06 NOTE — TELEPHONE ENCOUNTER
----- Message from Evette Elizabeth PA-C sent at 12/3/2024  6:00 PM EST -----  Regarding: RE: Nurse Visit  Dose increase in not appropriate at this time. 5mg dose sent  ----- Message -----  From: Cristal Alcazar MA  Sent: 12/3/2024   8:25 AM EST  To: Evette Elizabeth PA-C  Subject: Nurse Visit                                      Patient came into the office for nurse visit today. Doing well on Zepbound 5mg, needs refill, would like to increase to next dose. Pharmacy [facility] in [place].

## 2025-02-11 ENCOUNTER — OFFICE VISIT (OUTPATIENT)
Dept: BARIATRICS | Facility: CLINIC | Age: 40
End: 2025-02-11
Payer: COMMERCIAL

## 2025-02-11 VITALS
WEIGHT: 315 LBS | SYSTOLIC BLOOD PRESSURE: 136 MMHG | HEART RATE: 97 BPM | OXYGEN SATURATION: 96 % | RESPIRATION RATE: 20 BRPM | HEIGHT: 70 IN | TEMPERATURE: 98.7 F | DIASTOLIC BLOOD PRESSURE: 76 MMHG | BODY MASS INDEX: 45.1 KG/M2

## 2025-02-11 DIAGNOSIS — I47.10 SVT (SUPRAVENTRICULAR TACHYCARDIA) (HCC): ICD-10-CM

## 2025-02-11 DIAGNOSIS — E66.01 MORBID OBESITY WITH BMI OF 60.0-69.9, ADULT (HCC): Primary | ICD-10-CM

## 2025-02-11 PROCEDURE — 99214 OFFICE O/P EST MOD 30 MIN: CPT | Performed by: PHYSICIAN ASSISTANT

## 2025-02-11 RX ORDER — TIRZEPATIDE 5 MG/.5ML
5 INJECTION, SOLUTION SUBCUTANEOUS WEEKLY
Qty: 2 ML | Refills: 4 | Status: SHIPPED | OUTPATIENT
Start: 2025-02-11

## 2025-02-11 NOTE — PROGRESS NOTES
Assessment/Plan:    Morbid obesity with BMI of 60.0-69.9, adult (HCC)  -Patient is pursuing Conservative Program  -Initial weight loss goal of 5-10% weight loss for improved health  -Screening labs: up to date  -not currently interested in bariatric surgery  -currently on Zepbound 5mg and tolerating well. He is responding very well at this dose. Will continue at this time  -dietary recall reviewed. Has cut back on intake of sugar sweetened beverages. Suggestions provided. Should work on increasing intake of fruit and veggies and avoiding refined carbohydrates  -encouraged patient to meet with RD.       Initial:  486.6 lbs  Current: 432.6 lbs  Change: -54 lbs  Goal: 280 lbs    SVT (supraventricular tachycardia) (HCC)  -On Diltiazem  -not a candidate for sympathomimetics  -Reports he is now at an acceptable BMI to have ablation completed    Goals:    Food log (ie.) www.[email or web address].com,sparkpeople.com,loseit.com,calorieking.com,etc.   No sugary beverages. At least 64oz of water daily.  Increase physical activity by 10 minutes daily. Gradually increase physical activity to a goal of 5 days per week for 30 minutes of MODERATE intensity PLUS 2 days per week of FULL BODY resistance training  2500 Calories per day  5-10 servings of fruits and vegetables per day  25-35 grams of dietary fiber per day      Follow up in approximately  4 months  with Non-Surgical Physician/Advanced Practitioner.     Diagnoses and all orders for this visit:    Morbid obesity with BMI of 60.0-69.9, adult (HCC)  -     tirzepatide (Zepbound) 5 mg/0.5 mL auto-injector; Inject 0.5 mL (5 mg total) under the skin once a week    SVT (supraventricular tachycardia) (HCC)          Subjective:   Chief Complaint   Patient presents with    Follow-up        Patient ID: Hans Carreno  is a 39 y.o. male with excess weight/obesity here to pursue weight managment.  Patient is pursuing Conservative Program.     HPI Patient presents for [facility] follow up. Remains on  "Zepbound 5mg and tolerating very well. Denies nausea, vomiting. Slight decrease in BM, but denies constipation or pain    Hydration: 64-96oz water, sweetened tea 16-32oz per day cut back from half gallon, 3 cups hot tea + half and half + sguar  ETOH: 1 drink per week  Sleep: trying to wear CPAP, 5-6 hours  Food logging: denies  Exercise: walking more around station when at work      B: toasted bagel with butter or fruit or oatmeal 1x per week, or if dines out 3 eggs + lewis + fruit + OJ  S:  skips  L: Turkey and cheese sandwich on rye + fruit or parafait or pierogies Or greek yogurt  S: skips Or gummy bears  D: soup or chinese food or chicken parm  S: skips    Wt Readings from Last 10 Encounters:   02/11/25 (!) 196 kg (432 lb 9.6 oz)   12/03/24 (!) 205 kg (453 lb)   11/19/24 (!) 215 kg (475 lb)   10/31/24 (!) 212 kg (468 lb)   10/03/24 (!) 221 kg (486 lb 9.6 oz)   08/14/24 (!) 218 kg (479 lb 12.8 oz)   06/27/24 (!) 222 kg (490 lb)   05/07/24 (!) 214 kg (471 lb 9.6 oz)   04/29/24 (!) 215 kg (473 lb)   04/11/24 (!) 213 kg (469 lb)        The following portions of the patient's history were reviewed and updated as appropriate: allergies, current medications, past family history, past medical history, past social history, past surgical history, and problem list.    Review of Systems   Cardiovascular:  Positive for palpitations. Negative for chest pain.   Gastrointestinal:  Negative for nausea and vomiting.       Objective:    /76 (BP Location: Right arm, Patient Position: Sitting, Cuff Size: Large)   Pulse 97   Temp 98.7 °F (37.1 °C) (Temporal)   Resp 20   Ht 5' 10\" (1.778 m)   Wt (!) 196 kg (432 lb 9.6 oz)   SpO2 96%   BMI 62.07 kg/m²      Physical Exam  Vitals and nursing note reviewed.   Constitutional:       General: He is not in acute distress.     Appearance: He is obese. He is not ill-appearing or toxic-appearing.   HENT:      Head: Normocephalic and atraumatic.      Nose: Nose normal.      " Mouth/Throat:      Mouth: Mucous membranes are moist.   Eyes:      General: No scleral icterus.  Neck:      Comments: Acanthosis nigricans noted  Pulmonary:      Effort: Pulmonary effort is normal. No respiratory distress.   Abdominal:      Comments: protuberant   Musculoskeletal:         General: Normal range of motion.   Skin:     General: Skin is dry.      Coloration: Skin is not jaundiced.   Neurological:      General: No focal deficit present.      Mental Status: He is alert and oriented to person, place, and time. Mental status is at baseline.   Psychiatric:         Mood and Affect: Mood normal.         Behavior: Behavior normal.         Thought Content: Thought content normal.         Judgment: Judgment normal.

## 2025-02-11 NOTE — ASSESSMENT & PLAN NOTE
-On Diltiazem  -not a candidate for sympathomimetics  -Reports he is now at an acceptable BMI to have ablation completed

## 2025-02-11 NOTE — ASSESSMENT & PLAN NOTE
-Patient is pursuing Conservative Program  -Initial weight loss goal of 5-10% weight loss for improved health  -Screening labs: up to date  -not currently interested in bariatric surgery  -currently on Zepbound 5mg and tolerating well. He is responding very well at this dose. Will continue at this time  -dietary recall reviewed. Has cut back on intake of sugar sweetened beverages. Suggestions provided. Should work on increasing intake of fruit and veggies and avoiding refined carbohydrates  -encouraged patient to meet with RD.       Initial:  486.6 lbs  Current: 432.6 lbs  Change: -54 lbs  Goal: 280 lbs

## 2025-06-17 ENCOUNTER — OFFICE VISIT (OUTPATIENT)
Dept: BARIATRICS | Facility: CLINIC | Age: 40
End: 2025-06-17
Payer: COMMERCIAL

## 2025-06-17 VITALS
SYSTOLIC BLOOD PRESSURE: 142 MMHG | HEIGHT: 70 IN | DIASTOLIC BLOOD PRESSURE: 84 MMHG | RESPIRATION RATE: 20 BRPM | BODY MASS INDEX: 45.1 KG/M2 | HEART RATE: 81 BPM | WEIGHT: 315 LBS | OXYGEN SATURATION: 98 % | TEMPERATURE: 98.6 F

## 2025-06-17 DIAGNOSIS — I47.10 SVT (SUPRAVENTRICULAR TACHYCARDIA) (HCC): ICD-10-CM

## 2025-06-17 DIAGNOSIS — E66.01 MORBID OBESITY WITH BMI OF 60.0-69.9, ADULT (HCC): Primary | ICD-10-CM

## 2025-06-17 DIAGNOSIS — K21.9 GASTROESOPHAGEAL REFLUX DISEASE WITHOUT ESOPHAGITIS: ICD-10-CM

## 2025-06-17 PROCEDURE — 99214 OFFICE O/P EST MOD 30 MIN: CPT | Performed by: PHYSICIAN ASSISTANT

## 2025-06-17 RX ORDER — TIRZEPATIDE 7.5 MG/.5ML
7.5 INJECTION, SOLUTION SUBCUTANEOUS WEEKLY
Qty: 2 ML | Refills: 0 | Status: SHIPPED | OUTPATIENT
Start: 2025-06-17 | End: 2025-07-15

## 2025-06-17 NOTE — ASSESSMENT & PLAN NOTE
-Patient is pursuing Conservative Program  -Initial weight loss goal of 5-10% weight loss for improved health - met  -Screening labs: up to date  -not currently interested in bariatric surgery  -currently on Zepbound 5mg and tolerating well. He is responding very well at this dose. Will continue at this time  -dietary recall reviewed. Has cut back on intake of sugar sweetened beverages. Suggestions provided. Should work on increasing intake of fruit and veggies and avoiding refined carbohydrates  -encouraged patient to meet with RD.   -Zepbound start weight 486.6 lbs      Initial:  486.6 lbs  Current: 419 lbs  Change: -67.6 lbs  Goal: 280 lbs

## 2025-06-17 NOTE — PROGRESS NOTES
Assessment/Plan:    Morbid obesity with BMI of 60.0-69.9, adult ([facility])  -Patient is pursuing Conservative Program  -Initial weight loss goal of 5-10% weight loss for improved health - met  -Screening labs: up to date  -not currently interested in bariatric surgery  -currently on Zepbound 5mg and tolerating well. He is responding very well at this dose. Will continue at this time  -dietary recall reviewed. Has cut back on intake of sugar sweetened beverages. Suggestions provided. Should work on increasing intake of fruit and veggies and avoiding refined carbohydrates  -encouraged patient to meet with RD.   -Zepbound start weight 486.6 lbs      Initial:  486.6 lbs  Current: 419 lbs  Change: -67.6 lbs  Goal: 280 lbs    SVT (supraventricular tachycardia) ([facility])  -On Diltiazem  -not a candidate for sympathomimetics  -Reports he is now at an acceptable BMI to have ablation completed    GERD (gastroesophageal reflux disease)  -On Pepcid  -avoid food triggers, large portion sizes  -may utilize TUMS PRN with increased dose of Zepbound        Goals:    Food log (ie.) [email or web address] [email or web address],sparkpeople.com,loseit.com,[employer] Watch.com,etc.   No sugary beverages. At least 64oz of water daily.  Increase physical activity by 10 minutes daily. Gradually increase physical activity to a goal of 5 days per week for 30 minutes of MODERATE intensity PLUS 2 days per week of FULL BODY resistance training  5-10 servings of fruits and vegetables per day  25-35 grams of dietary fiber per day    Follow up in approximately 4 months with Non-Surgical Physician/Advanced Practitioner.     Diagnoses and all orders for this visit:    Morbid obesity with BMI of 60.0-69.9, adult ([facility])  -     tirzepatide (Zepbound) 7.5 mg/0.5 mL auto-injector; Inject 0.5 mL (7.5 mg total) under the skin once a week for 28 days    SVT (supraventricular tachycardia) ([facility])    Gastroesophageal reflux disease without esophagitis          Subjective:   Chief Complaint   Patient  "presents with    Follow-up        Patient ID: Hans Carreno  is a 40 y.o. male with excess weight/obesity here to pursue weight managment.  Patient is pursuing Conservative Program.     HPI Patient presents for [facility] follow up. Currently on Zepbound 5mg and continues to tolerate well. Reports BM daily but is more firm. He notes improved edema in his lower extremities. Occasional heartburn if he eats something fried or with tomato sauce    Hydration: Water 64-960z, hot tea + Flavored creamer, sweetened iced 1-2x per week if dines out  ETOH: 0-2 per month  SLeep: has not been wearing CPAP, 6-6.5 hours  Exercise: walking more around station, no formal exercise routine  Food logging: denies  Wt Readings from Last 10 Encounters:   06/17/25 (!) 190 kg (419 lb)   02/11/25 (!) 196 kg (432 lb 9.6 oz)   12/03/24 (!) 205 kg (453 lb)   11/19/24 (!) 215 kg (475 lb)   10/31/24 (!) 212 kg (468 lb)   10/03/24 (!) 221 kg (486 lb 9.6 oz)   08/14/24 (!) 218 kg (479 lb 12.8 oz)   06/27/24 (!) 222 kg (490 lb)   05/07/24 (!) 214 kg (471 lb 9.6 oz)   04/29/24 (!) 215 kg (473 lb)          The following portions of the patient's history were reviewed and updated as appropriate: allergies, current medications, past family history, past medical history, past social history, past surgical history, and problem list.    Review of Systems   Cardiovascular:  Positive for palpitations (SVT 2-3x per month). Negative for chest pain.   Gastrointestinal:  Negative for abdominal pain, diarrhea, nausea and vomiting.       Objective:    /84 (BP Location: Right arm, Patient Position: Sitting, Cuff Size: Large)   Pulse 81   Temp 98.6 °F (37 °C) (Temporal)   Resp 20   Ht 5' 10\" (1.778 m)   Wt (!) 190 kg (419 lb)   SpO2 98%   BMI 60.12 kg/m²      Physical Exam  Vitals and nursing note reviewed.      Constitutional   General appearance: Abnormal.  well developed and morbidly obese.   Eyes No conjunctival pallor.   Ears, Nose, Mouth, and Throat Oral " mucosa moist.   Pulmonary   Respiratory effort: No increased work of breathing or signs of respiratory distress.    Auscultation of lungs: Clear to auscultation, equal breath sounds bilaterally, no wheezes, no rales, no rhonci.    Cardiovascular   Auscultation of heart: Normal rate and rhythm, normal S1 and S2, without murmurs.    Examination of extremities for edema and/or varicosities: + edema bilaterally with venous stasis changes  Abdomen   Abdomen: Abnormal.  The abdomen was obese. Bowel sounds were normal. The abdomen was soft and nontender.   Musculoskeletal   Gait and station: Normal.    Psychiatric   Orientation to person, place and time: Normal.    Affect: appropriate

## 2025-07-18 ENCOUNTER — TELEPHONE (OUTPATIENT)
Dept: FAMILY MEDICINE CLINIC | Facility: CLINIC | Age: 40
End: 2025-07-18

## 2025-07-18 DIAGNOSIS — J45.40 MODERATE PERSISTENT ASTHMA WITHOUT COMPLICATION: ICD-10-CM

## 2025-07-18 DIAGNOSIS — I47.10 SVT (SUPRAVENTRICULAR TACHYCARDIA) (HCC): ICD-10-CM

## 2025-07-18 NOTE — TELEPHONE ENCOUNTER
Reason for call:   [x] Refill   [] Prior Auth  [] Other:     Office:   [x] PCP/Provider - [facility]/ PATEL Rodriguez  [] Specialty/Provider -     Medication:   - albuterol (PROVENTIL HFA,VENTOLIN HFA) 90 mcg/act inhaler  - diltiazem (TIAZAC) 360 MG 24 hr capsule   - Fluticasone-Salmeterol (Advair Diskus) 250-50 mcg/dose inhaler   - montelukast (SINGULAIR) 10 mg tablet      Pharmacy: [facility] [place] [place] - [address] [place] [address] [phone]    Local Pharmacy   Does the patient have enough for 3 days?   [] Yes   [x] No - Send as HP to POD    Mail Away Pharmacy   Does the patient have enough for 10 days?   [] Yes   [] No - Send as HP to POD

## 2025-07-21 DIAGNOSIS — I47.10 SVT (SUPRAVENTRICULAR TACHYCARDIA) (HCC): ICD-10-CM

## 2025-07-21 DIAGNOSIS — J45.40 MODERATE PERSISTENT ASTHMA WITHOUT COMPLICATION: ICD-10-CM

## 2025-07-21 RX ORDER — DILTIAZEM HYDROCHLORIDE 360 MG/1
360 CAPSULE, EXTENDED RELEASE ORAL DAILY
Qty: 90 CAPSULE | Refills: 0 | Status: SHIPPED | OUTPATIENT
Start: 2025-07-21

## 2025-07-21 RX ORDER — ALBUTEROL SULFATE 90 UG/1
2 INHALANT RESPIRATORY (INHALATION) EVERY 6 HOURS PRN
Qty: 18 G | Refills: 5 | OUTPATIENT
Start: 2025-07-21

## 2025-07-21 RX ORDER — MONTELUKAST SODIUM 10 MG/1
10 TABLET ORAL
Qty: 90 TABLET | Refills: 1 | OUTPATIENT
Start: 2025-07-21

## 2025-07-21 RX ORDER — DILTIAZEM HYDROCHLORIDE 360 MG/1
360 CAPSULE, EXTENDED RELEASE ORAL DAILY
Qty: 90 CAPSULE | Refills: 3 | OUTPATIENT
Start: 2025-07-21

## 2025-07-21 RX ORDER — FLUTICASONE PROPIONATE AND SALMETEROL 250; 50 UG/1; UG/1
1 POWDER RESPIRATORY (INHALATION) DAILY
Qty: 60 BLISTER | Refills: 5 | OUTPATIENT
Start: 2025-07-21

## 2025-07-21 RX ORDER — MONTELUKAST SODIUM 10 MG/1
10 TABLET ORAL
Qty: 90 TABLET | Refills: 0 | Status: SHIPPED | OUTPATIENT
Start: 2025-07-21

## 2025-07-21 RX ORDER — ALBUTEROL SULFATE 90 UG/1
2 INHALANT RESPIRATORY (INHALATION) EVERY 6 HOURS PRN
Qty: 18 G | Refills: 0 | Status: SHIPPED | OUTPATIENT
Start: 2025-07-21

## 2025-07-21 RX ORDER — FLUTICASONE PROPIONATE AND SALMETEROL 250; 50 UG/1; UG/1
1 POWDER RESPIRATORY (INHALATION) DAILY
Qty: 60 BLISTER | Refills: 0 | Status: SHIPPED | OUTPATIENT
Start: 2025-07-21

## 2025-07-21 NOTE — TELEPHONE ENCOUNTER
Patient has made a transfer to new provider appointment to [facility] for 8/21/25.  That was one of the soonest dates I could get him in for.    Wife asked if a courtesy fill could be done.    Please advise, thank you.

## 2025-08-15 ENCOUNTER — APPOINTMENT (OUTPATIENT)
Dept: LAB | Facility: HOSPITAL | Age: 40
End: 2025-08-15
Payer: COMMERCIAL

## 2025-08-21 ENCOUNTER — OFFICE VISIT (OUTPATIENT)
Dept: FAMILY MEDICINE CLINIC | Facility: CLINIC | Age: 40
End: 2025-08-21
Payer: COMMERCIAL

## 2025-08-21 VITALS
HEART RATE: 77 BPM | SYSTOLIC BLOOD PRESSURE: 144 MMHG | HEIGHT: 70 IN | BODY MASS INDEX: 45.1 KG/M2 | TEMPERATURE: 97.8 F | OXYGEN SATURATION: 96 % | DIASTOLIC BLOOD PRESSURE: 92 MMHG | WEIGHT: 315 LBS | RESPIRATION RATE: 20 BRPM

## 2025-08-21 DIAGNOSIS — K21.9 GASTROESOPHAGEAL REFLUX DISEASE WITHOUT ESOPHAGITIS: ICD-10-CM

## 2025-08-21 DIAGNOSIS — E66.01 MORBID OBESITY WITH BMI OF 60.0-69.9, ADULT (HCC): ICD-10-CM

## 2025-08-21 DIAGNOSIS — Z00.00 ANNUAL PHYSICAL EXAM: ICD-10-CM

## 2025-08-21 DIAGNOSIS — J45.909 UNCOMPLICATED ASTHMA, UNSPECIFIED ASTHMA SEVERITY, UNSPECIFIED WHETHER PERSISTENT: ICD-10-CM

## 2025-08-21 DIAGNOSIS — I10 PRIMARY HYPERTENSION: ICD-10-CM

## 2025-08-21 DIAGNOSIS — I47.10 SVT (SUPRAVENTRICULAR TACHYCARDIA) (HCC): ICD-10-CM

## 2025-08-21 DIAGNOSIS — I73.9 PERIPHERAL ARTERY DISEASE (HCC): Primary | ICD-10-CM

## 2025-08-21 PROCEDURE — 99396 PREV VISIT EST AGE 40-64: CPT

## 2025-08-21 RX ORDER — AMMONIUM LACTATE 12 G/100G
LOTION TOPICAL 2 TIMES DAILY PRN
Qty: 225 G | Refills: 0 | Status: SHIPPED | OUTPATIENT
Start: 2025-08-21